# Patient Record
Sex: FEMALE | Race: OTHER | Employment: FULL TIME | URBAN - METROPOLITAN AREA
[De-identification: names, ages, dates, MRNs, and addresses within clinical notes are randomized per-mention and may not be internally consistent; named-entity substitution may affect disease eponyms.]

---

## 2017-01-04 ENCOUNTER — OFFICE VISIT (OUTPATIENT)
Dept: ENDOCRINOLOGY CLINIC | Facility: CLINIC | Age: 42
End: 2017-01-04

## 2017-01-04 VITALS
HEART RATE: 99 BPM | DIASTOLIC BLOOD PRESSURE: 62 MMHG | BODY MASS INDEX: 35.58 KG/M2 | WEIGHT: 200.81 LBS | HEIGHT: 63 IN | SYSTOLIC BLOOD PRESSURE: 111 MMHG

## 2017-01-04 DIAGNOSIS — Z79.4 UNCONTROLLED TYPE 2 DIABETES MELLITUS WITH HYPERGLYCEMIA, WITH LONG-TERM CURRENT USE OF INSULIN (HCC): Primary | ICD-10-CM

## 2017-01-04 DIAGNOSIS — E11.65 UNCONTROLLED TYPE 2 DIABETES MELLITUS WITH HYPERGLYCEMIA, WITH LONG-TERM CURRENT USE OF INSULIN (HCC): Primary | ICD-10-CM

## 2017-01-04 LAB
GLUCOSE BLOOD: 224
TEST STRIP LOT #: NORMAL NUMERIC

## 2017-01-04 PROCEDURE — 82962 GLUCOSE BLOOD TEST: CPT | Performed by: INTERNAL MEDICINE

## 2017-01-04 PROCEDURE — 99213 OFFICE O/P EST LOW 20 MIN: CPT | Performed by: INTERNAL MEDICINE

## 2017-01-04 PROCEDURE — 99212 OFFICE O/P EST SF 10 MIN: CPT | Performed by: INTERNAL MEDICINE

## 2017-01-04 PROCEDURE — 36416 COLLJ CAPILLARY BLOOD SPEC: CPT | Performed by: INTERNAL MEDICINE

## 2017-01-04 NOTE — PROGRESS NOTES
Name: Natasha Grant  Date: 1/4/2017    Referring Physician: No ref. provider found    HISTORY OF PRESENT ILLNESS   Natasha Grant is a 46year old female who presents for diabetes mellitus, diagnosed over 20 years ago.   Since last visit she lost her job and tablet (20 mg total) by mouth nightly., Disp: 30 tablet, Rfl: 3  •  BD PEN NEEDLE HOSSEIN U/F 32G X 4 MM Does not apply Misc, USE UTD, Disp: , Rfl: 11  •  Glucose Blood (TIAGO CONTOUR TEST) In Vitro Strip, Check BS twice daily, Disp: 100 strip, Rfl: 11  •  In Fede Wren (x2)         PHYSICAL EXAM  /62 mmHg  Pulse 99  Ht 5' 3\" (1.6 m)  Wt 200 lb 12.8 oz (91.082 kg)  BMI 35.58 kg/m2    General Appearance:  alert, well developed, in no acute distress  Eyes:  normal conjunctivae, sclera. , normal sclera

## 2017-01-04 NOTE — PATIENT INSTRUCTIONS
Toujeo 48 units SQ daily    Humalog  INSULIN SLIDING SCALE  Base Values  Breakfast: 20  Lunch: 20  Dinner: 20  Ranges:  80-99: -2  100-119: 0  120-139: 0  140-159: 1  160-179: 1  180-199: 2  200-219: 2  220-239: 3  240-259: 3  260-279: 4  280-299: 4  300-3

## 2017-01-05 ENCOUNTER — TELEPHONE (OUTPATIENT)
Dept: FAMILY MEDICINE CLINIC | Facility: CLINIC | Age: 42
End: 2017-01-05

## 2017-01-05 NOTE — TELEPHONE ENCOUNTER
LMTCB. Please transfer to [id number] until 4:45pm today, and [id number] any time. Please note pt was seen by Dr Skye Reza yesterday regarding uncontrolled DM.

## 2017-01-05 NOTE — TELEPHONE ENCOUNTER
----- Message from Vincent Shankar DO sent at 1/2/2017 10:03 PM CST -----  Make sure to get back on the insulin and see your endocrinologist.  Your hemoglobin A1c was at 9.2 showing that your diabetes is uncontrolled.   With the uncontrolled diabetes your tr

## 2017-01-27 ENCOUNTER — OFFICE VISIT (OUTPATIENT)
Dept: FAMILY MEDICINE CLINIC | Facility: CLINIC | Age: 42
End: 2017-01-27

## 2017-01-27 ENCOUNTER — APPOINTMENT (OUTPATIENT)
Dept: LAB | Age: 42
End: 2017-01-27
Attending: FAMILY MEDICINE
Payer: MEDICAID

## 2017-01-27 ENCOUNTER — HOSPITAL ENCOUNTER (OUTPATIENT)
Dept: GENERAL RADIOLOGY | Age: 42
Discharge: HOME | End: 2017-01-27
Attending: FAMILY MEDICINE
Payer: MEDICAID

## 2017-01-27 VITALS
DIASTOLIC BLOOD PRESSURE: 80 MMHG | HEIGHT: 63 IN | WEIGHT: 204 LBS | BODY MASS INDEX: 36.14 KG/M2 | HEART RATE: 94 BPM | TEMPERATURE: 97 F | SYSTOLIC BLOOD PRESSURE: 132 MMHG

## 2017-01-27 DIAGNOSIS — M25.511 CHRONIC RIGHT SHOULDER PAIN: ICD-10-CM

## 2017-01-27 DIAGNOSIS — M25.641 STIFFNESS OF HAND JOINT, RIGHT: ICD-10-CM

## 2017-01-27 DIAGNOSIS — M75.21 BICEPS TENDINITIS, RIGHT: Primary | ICD-10-CM

## 2017-01-27 DIAGNOSIS — G89.29 CHRONIC RIGHT SHOULDER PAIN: ICD-10-CM

## 2017-01-27 DIAGNOSIS — M75.21 BICEPS TENDINITIS, RIGHT: ICD-10-CM

## 2017-01-27 DIAGNOSIS — M25.642 HAND JOINT STIFF, LEFT: ICD-10-CM

## 2017-01-27 DIAGNOSIS — IMO0001 UNCONTROLLED TYPE 2 DIABETES MELLITUS WITHOUT COMPLICATION, WITH LONG-TERM CURRENT USE OF INSULIN: ICD-10-CM

## 2017-01-27 LAB
CRP SERPL-MCNC: 2 MG/DL (ref 0–0.9)
ERYTHROCYTE [SEDIMENTATION RATE] IN BLOOD: 8 MM/HR (ref 0–20)
RHEUMATOID FACT SER QL: <5 IU/ML

## 2017-01-27 PROCEDURE — 73030 X-RAY EXAM OF SHOULDER: CPT

## 2017-01-27 PROCEDURE — 99215 OFFICE O/P EST HI 40 MIN: CPT | Performed by: FAMILY MEDICINE

## 2017-01-27 PROCEDURE — 73130 X-RAY EXAM OF HAND: CPT

## 2017-01-27 PROCEDURE — 86431 RHEUMATOID FACTOR QUANT: CPT

## 2017-01-27 PROCEDURE — 86140 C-REACTIVE PROTEIN: CPT

## 2017-01-27 PROCEDURE — 85652 RBC SED RATE AUTOMATED: CPT

## 2017-01-27 PROCEDURE — 86038 ANTINUCLEAR ANTIBODIES: CPT

## 2017-01-27 PROCEDURE — 99212 OFFICE O/P EST SF 10 MIN: CPT | Performed by: FAMILY MEDICINE

## 2017-01-27 PROCEDURE — 36415 COLL VENOUS BLD VENIPUNCTURE: CPT

## 2017-01-27 RX ORDER — DICLOFENAC SODIUM 75 MG/1
75 TABLET, DELAYED RELEASE ORAL 2 TIMES DAILY
Qty: 42 TABLET | Refills: 0 | Status: SHIPPED | OUTPATIENT
Start: 2017-01-27 | End: 2017-02-13

## 2017-01-27 NOTE — PROGRESS NOTES
Patient ID: Saundra De Oliveira is a 46year old female. HPI  Patient presents with:  Shoulder Pain: Right  Medication Request: Insulin, pt wants a six month supply     She states for 2 months now she has had right shoulder pain anteriorly. No trauma.   She Subcutaneous Solution Pen-injector Inject 40 Units into the skin daily. Disp: 6 pen Rfl: 1   insulin aspart (NOVOLOG FLEXPEN) 100 UNIT/ML Subcutaneous Solution Pen-injector Inject 20 Units into the skin 3 (three) times daily before meals.  Disp: 5 pen Rfl: Internal Rotation 5/5 5/5      External Rotation 5/5 5/5      Speed's Test 5/5 5/5   Stability/Laxity WNL WNL   Pain in Impingement Arc none none   Apprehension Sign negative  negative    Pain         Rotator Cuff resistance none none       Bicipital Groov (two) times daily.  -     Rheumatology - Dr Nikolas Bhatti  -     XR HAND BILAT (CPT=73130);  Future    Uncontrolled type 2 diabetes mellitus without complication, with long-term current use of insulin (HCC)  -     Insulin Glargine (TOUJEO SOLOSTAR) 300 UNIT/M

## 2017-01-31 ENCOUNTER — TELEPHONE (OUTPATIENT)
Dept: FAMILY MEDICINE CLINIC | Facility: CLINIC | Age: 42
End: 2017-01-31

## 2017-01-31 DIAGNOSIS — R79.82 ELEVATED C-REACTIVE PROTEIN (CRP): ICD-10-CM

## 2017-01-31 DIAGNOSIS — M79.641 PAIN IN BOTH HANDS: Primary | ICD-10-CM

## 2017-01-31 DIAGNOSIS — M79.642 PAIN IN BOTH HANDS: Primary | ICD-10-CM

## 2017-01-31 DIAGNOSIS — M25.649 HAND JOINT STIFF, UNSPECIFIED LATERALITY: ICD-10-CM

## 2017-01-31 LAB — ANA SER QL: NEGATIVE

## 2017-02-13 ENCOUNTER — TELEPHONE (OUTPATIENT)
Dept: FAMILY MEDICINE CLINIC | Facility: CLINIC | Age: 42
End: 2017-02-13

## 2017-02-13 RX ORDER — NAPROXEN 500 MG/1
500 TABLET ORAL 2 TIMES DAILY WITH MEALS
Qty: 42 TABLET | Refills: 1 | Status: SHIPPED | OUTPATIENT
Start: 2017-02-13 | End: 2017-03-06

## 2017-02-13 NOTE — TELEPHONE ENCOUNTER
----- Message from Quintin Boyer DO sent at 1/28/2017  7:32 PM CST -----  Minimal arthritic changes at the acromioclavicular joint which is not uncommon. This is not severe enough for any type of surgery. Hopefully the medicine helps.

## 2017-02-13 NOTE — TELEPHONE ENCOUNTER
pt was inform of your message below. She stated that she was not able to continue taking Diclofenac as it made her get a cough and she felt cramps on her chest so she stopped taking it. Pt feels fine now but she continues to have the shoulder pain .

## 2017-02-14 NOTE — TELEPHONE ENCOUNTER
Left message to call back. OSMANY, Please transfer to 21 [phone] until 430 today, or [phone] anytime. Thanks.

## 2017-02-17 NOTE — TELEPHONE ENCOUNTER
Recommendations per Dr. Jacki Friedman reviewed. Pt verbalized understanding, agreed with information relayed, and denied further questions at this time.

## 2017-03-01 ENCOUNTER — OFFICE VISIT (OUTPATIENT)
Dept: ENDOCRINOLOGY CLINIC | Facility: CLINIC | Age: 42
End: 2017-03-01

## 2017-03-01 ENCOUNTER — TELEPHONE (OUTPATIENT)
Dept: OPHTHALMOLOGY | Facility: CLINIC | Age: 42
End: 2017-03-01

## 2017-03-01 VITALS
SYSTOLIC BLOOD PRESSURE: 116 MMHG | DIASTOLIC BLOOD PRESSURE: 78 MMHG | WEIGHT: 204 LBS | RESPIRATION RATE: 18 BRPM | HEART RATE: 84 BPM | BODY MASS INDEX: 36 KG/M2

## 2017-03-01 DIAGNOSIS — E13.9 OTHER SPECIFIED DIABETES MELLITUS: Primary | ICD-10-CM

## 2017-03-01 LAB
CARTRIDGE LOT#: ABNORMAL NUMERIC
GLUCOSE BLOOD: 170
HEMOGLOBIN A1C: 9.5 % (ref 4.3–5.6)
TEST STRIP LOT #: NORMAL NUMERIC

## 2017-03-01 PROCEDURE — 82962 GLUCOSE BLOOD TEST: CPT | Performed by: INTERNAL MEDICINE

## 2017-03-01 PROCEDURE — 99214 OFFICE O/P EST MOD 30 MIN: CPT | Performed by: INTERNAL MEDICINE

## 2017-03-01 PROCEDURE — 83036 HEMOGLOBIN GLYCOSYLATED A1C: CPT | Performed by: INTERNAL MEDICINE

## 2017-03-01 PROCEDURE — 36416 COLLJ CAPILLARY BLOOD SPEC: CPT | Performed by: INTERNAL MEDICINE

## 2017-03-01 PROCEDURE — 99212 OFFICE O/P EST SF 10 MIN: CPT | Performed by: INTERNAL MEDICINE

## 2017-03-01 RX ORDER — DULOXETIN HYDROCHLORIDE 30 MG/1
30 CAPSULE, DELAYED RELEASE ORAL DAILY
Qty: 30 CAPSULE | Refills: 5 | Status: SHIPPED | OUTPATIENT
Start: 2017-03-01 | End: 2017-08-30

## 2017-03-01 RX ORDER — CYCLOBENZAPRINE HCL 10 MG
TABLET ORAL
COMMUNITY
Start: 2017-02-28 | End: 2018-04-16

## 2017-03-01 NOTE — PATIENT INSTRUCTIONS
Toujeo 48 units SQ daily    Humalog  INSULIN SLIDING SCALE  Base Values  Breakfast: 20  Lunch: 20  Dinner: 20  Ranges:  80-99: -2  100-119: 0  120-139: 0  140-159: 1  160-179: 2  180-199: 3  200-219: 4  220-239: 5  240-259: 6  260-279: 7  280-299: 8  300-3

## 2017-03-01 NOTE — PROGRESS NOTES
Name: Miah Larson  Date: 3/1/2017    Referring Physician: No ref. provider found    HISTORY OF PRESENT ILLNESS   Miah Larson is a 46year old female who presents for diabetes mellitus, diagnosed over 20 years ago.   Since last visit she lost her job and 6  •  insulin aspart (NOVOLOG FLEXPEN) 100 UNIT/ML Subcutaneous Solution Pen-injector, Inject 20 Units into the skin 3 (three) times daily before meals. , Disp: 5 pen, Rfl: 6  •  Blood Glucose Monitoring Suppl (TRUE METRIX METER) W/DEVICE Does not apply Kit [facility], 33 1.2 week, 6 lb(s) Male / Stefan Ochoa ([address]) Baby sent to [facility] for 1 month due to breathing issues   • Pregnancy      8 hr labor  ; [facility]; 36 week 7 lb(s) Male; [facility] Dr Valdemar Terrell) baby had pr dose  -Discussed importance of glycemic control to prevent complications of diabetes  -Discussed complications of diabetes include retinopathy, neuropathy, nephropathy and cardiovascular disease  -Discussed importance of SBGM  -Discussed importance of low

## 2017-03-01 NOTE — TELEPHONE ENCOUNTER
Pt came in today stated her ins changed and was referred to Dr. Sena Boeck and he doesn't take illinicare. She wants to know if you can refer her to another doctor or if you want to see her first. She does have an appt with you on 03/28.

## 2017-03-02 NOTE — TELEPHONE ENCOUNTER
Patient has diabetic retinopathy and  at her last EE with me  I advised that she see [facility]. She went and was being treated there but she lost her health insurance and now has [employer] . [facility] does not accept [employer].  She has an appoiontment t

## 2017-03-23 ENCOUNTER — TELEPHONE (OUTPATIENT)
Dept: OPTOMETRY | Facility: CLINIC | Age: 42
End: 2017-03-23

## 2017-03-23 NOTE — TELEPHONE ENCOUNTER
Rody/Rafa [phone]  requesting progress note from LOV:08/11/2015. please fax# [phone] thank you.

## 2017-03-28 ENCOUNTER — OFFICE VISIT (OUTPATIENT)
Dept: OPTOMETRY | Facility: CLINIC | Age: 42
End: 2017-03-28

## 2017-03-28 DIAGNOSIS — H52.13 MYOPIA WITH ASTIGMATISM, BILATERAL: Primary | ICD-10-CM

## 2017-03-28 DIAGNOSIS — H52.203 MYOPIA WITH ASTIGMATISM, BILATERAL: Primary | ICD-10-CM

## 2017-03-28 PROBLEM — H52.10 MYOPIA WITH ASTIGMATISM: Status: ACTIVE | Noted: 2017-03-28

## 2017-03-28 PROBLEM — H52.209 MYOPIA WITH ASTIGMATISM: Status: ACTIVE | Noted: 2017-03-28

## 2017-03-28 PROCEDURE — 92015 DETERMINE REFRACTIVE STATE: CPT | Performed by: OPTOMETRIST

## 2017-03-28 PROCEDURE — 99211 OFF/OP EST MAY X REQ PHY/QHP: CPT | Performed by: OPTOMETRIST

## 2017-03-28 NOTE — PROGRESS NOTES
Brandon Huitron is a 46year old female. HPI:     HPI     Patient is in for a  Refraction only. Patient has BDR and was seeing [facility] before her insurance changed. She has [employer] and I advised her to make an appt with Samuel Velazquez for a recheck.  She sullivan Pen-injector Inject 40 Units into the skin daily. Disp: 6 pen Rfl: 6   insulin aspart (NOVOLOG FLEXPEN) 100 UNIT/ML Subcutaneous Solution Pen-injector Inject 20 Units into the skin 3 (three) times daily before meals.  Disp: 5 pen Rfl: 6   Blood Glucose Giulia Axis   Right -0.25 -5.50 20   Left -0.75 -2.50 170       Type:  Single vision                 ASSESSMENT/PLAN:     Diagnoses and Plan:     Myopia with astigmatism  New glasses RX given to update as needed.  Patient has an appointment with Catherine Evans As

## 2017-03-28 NOTE — PATIENT INSTRUCTIONS
Myopia with astigmatism  New glasses RX given to update as needed. Patient has an appointment with [facility] on April 7, 2017. Patient will keep the appointment.

## 2017-03-28 NOTE — ASSESSMENT & PLAN NOTE
New glasses RX given to update as needed. Patient has an appointment with [facility] on April 7, 2017. Patient will keep the appointment.

## 2017-04-03 ENCOUNTER — TELEPHONE (OUTPATIENT)
Dept: ENDOCRINOLOGY CLINIC | Facility: CLINIC | Age: 42
End: 2017-04-03

## 2017-04-03 DIAGNOSIS — IMO0001 UNCONTROLLED TYPE 2 DIABETES MELLITUS WITHOUT COMPLICATION, WITH LONG-TERM CURRENT USE OF INSULIN: Primary | ICD-10-CM

## 2017-04-03 NOTE — TELEPHONE ENCOUNTER
Called patient with interpretor. Let her know per [facility] she should switch to Basaglar at the same dose. Prescription sent to pharmacy.

## 2017-04-03 NOTE — TELEPHONE ENCOUNTER
Pt states ins will not cover Insulin Glargine (TOUJEO SOLOSTAR) 300 UNIT/ML asking for an alternative insulin. Pls advise thank you. Yi speaker.

## 2017-07-19 ENCOUNTER — OFFICE VISIT (OUTPATIENT)
Dept: ENDOCRINOLOGY CLINIC | Facility: CLINIC | Age: 42
End: 2017-07-19

## 2017-07-19 VITALS
SYSTOLIC BLOOD PRESSURE: 132 MMHG | DIASTOLIC BLOOD PRESSURE: 86 MMHG | WEIGHT: 200.81 LBS | BODY MASS INDEX: 36.95 KG/M2 | HEIGHT: 62 IN | HEART RATE: 87 BPM

## 2017-07-19 DIAGNOSIS — E11.65 UNCONTROLLED TYPE 2 DIABETES MELLITUS WITH HYPERGLYCEMIA, WITH LONG-TERM CURRENT USE OF INSULIN (HCC): Primary | ICD-10-CM

## 2017-07-19 DIAGNOSIS — IMO0001 UNCONTROLLED TYPE 2 DIABETES MELLITUS WITHOUT COMPLICATION, WITH LONG-TERM CURRENT USE OF INSULIN: ICD-10-CM

## 2017-07-19 DIAGNOSIS — Z79.4 UNCONTROLLED TYPE 2 DIABETES MELLITUS WITH HYPERGLYCEMIA, WITH LONG-TERM CURRENT USE OF INSULIN (HCC): Primary | ICD-10-CM

## 2017-07-19 LAB
CARTRIDGE LOT#: ABNORMAL NUMERIC
GLUCOSE BLOOD: 220
HEMOGLOBIN A1C: 11.1 % (ref 4.3–5.6)
TEST STRIP LOT #: NORMAL NUMERIC

## 2017-07-19 PROCEDURE — 83036 HEMOGLOBIN GLYCOSYLATED A1C: CPT | Performed by: INTERNAL MEDICINE

## 2017-07-19 PROCEDURE — 99214 OFFICE O/P EST MOD 30 MIN: CPT | Performed by: INTERNAL MEDICINE

## 2017-07-19 PROCEDURE — 36416 COLLJ CAPILLARY BLOOD SPEC: CPT | Performed by: INTERNAL MEDICINE

## 2017-07-19 PROCEDURE — 99212 OFFICE O/P EST SF 10 MIN: CPT | Performed by: INTERNAL MEDICINE

## 2017-07-19 PROCEDURE — 82962 GLUCOSE BLOOD TEST: CPT | Performed by: INTERNAL MEDICINE

## 2017-07-19 NOTE — PROGRESS NOTES
Name: Seven Boland  Date: 7/19/2017    Referring Physician: No ref. provider found    HISTORY OF PRESENT ILLNESS   Seven Boland is a 38year old female who presents for diabetes mellitus, diagnosed over 20 years ago.   She is frequently forgetting her ins by Does not apply route daily. , Disp: 1 kit, Rfl: 0  •  Glucose Blood (TRUE METRIX BLOOD GLUCOSE TEST) In Vitro Strip, Check 3 times daily, Disp: 100 strip, Rfl: 6  •  Lancets 30G Does not apply Misc, Check 3 times per day, Disp: 100 each, Rfl: 3  •  Insul Pregnancy     spontaneous , unknown sex, SAB at 6 weeks no D&C   • Pregnancy 2009    D&C 2009   • [facility] 2009   • Unspecified essential hypertension     stopped medication    • Urinary incontinence 2008 diet  -Increase Basaglar to 60 units SQ daily   -Continue Novolog 18 units SQ TID with meals  -Continue CF 1:40>140  -Normotensive  -UTD with optho  -Discussed at length the importance of taking insulin 4 times per day     This is a 25 minute visit and gre

## 2017-07-19 NOTE — PATIENT INSTRUCTIONS
Basaglar (white) 60 units SQ daily in the morning ([facility])    Novolog  INSULIN SLIDING SCALE  Base Values  Breakfast: 18  Lunch: 18  Dinner: 18  Ranges:  80-99: -2  100-119: 0  120-139: 0  140-159: 1  160-179: 1  180-199: 2  200-219: 2  220-239: 3  240-259

## 2017-08-30 ENCOUNTER — TELEPHONE (OUTPATIENT)
Dept: ENDOCRINOLOGY CLINIC | Facility: CLINIC | Age: 42
End: 2017-08-30

## 2017-08-30 RX ORDER — DULOXETIN HYDROCHLORIDE 30 MG/1
30 CAPSULE, DELAYED RELEASE ORAL DAILY
Qty: 30 CAPSULE | Refills: 2 | Status: SHIPPED | OUTPATIENT
Start: 2017-08-30 | End: 2018-10-26

## 2017-08-30 NOTE — TELEPHONE ENCOUNTER
LOV 7/19/2017. Looks like last refill was under Dr. Debbie Steiner name. Are you prescribing for patient or route to PCP?

## 2017-08-30 NOTE — TELEPHONE ENCOUNTER
[facility] called to request refill:    Current Outpatient Prescriptions:     •  DULoxetine HCl (CYMBALTA) 30 MG Oral Cap DR Particles, Take 1 capsule (30 mg total) by mouth daily. , Disp: 30 capsule, Rfl: 5

## 2018-02-19 ENCOUNTER — TELEPHONE (OUTPATIENT)
Dept: ENDOCRINOLOGY CLINIC | Facility: CLINIC | Age: 43
End: 2018-02-19

## 2018-02-19 DIAGNOSIS — IMO0001 UNCONTROLLED TYPE 2 DIABETES MELLITUS WITHOUT COMPLICATION, WITH LONG-TERM CURRENT USE OF INSULIN: ICD-10-CM

## 2018-02-19 NOTE — TELEPHONE ENCOUNTER
LOV 7/19/2017. Follow up scheduled 2/21. Per [facility] protocol ok to refill through upcoming appt.

## 2018-02-19 NOTE — TELEPHONE ENCOUNTER
Current Outpatient Prescriptions:  Insulin Aspart Pen (NOVOLOG FLEXPEN) 100 UNIT/ML Subcutaneous Solution Pen-injector Inject 20 Units into the skin 3 (three) times daily before meals.  Disp: 5 pen Rfl: 6     Refill

## 2018-02-21 ENCOUNTER — APPOINTMENT (OUTPATIENT)
Dept: LAB | Age: 43
End: 2018-02-21
Attending: INTERNAL MEDICINE
Payer: MEDICAID

## 2018-02-21 ENCOUNTER — OFFICE VISIT (OUTPATIENT)
Dept: ENDOCRINOLOGY CLINIC | Facility: CLINIC | Age: 43
End: 2018-02-21

## 2018-02-21 ENCOUNTER — TELEPHONE (OUTPATIENT)
Dept: ENDOCRINOLOGY CLINIC | Facility: CLINIC | Age: 43
End: 2018-02-21

## 2018-02-21 VITALS
HEART RATE: 94 BPM | BODY MASS INDEX: 35.35 KG/M2 | DIASTOLIC BLOOD PRESSURE: 68 MMHG | SYSTOLIC BLOOD PRESSURE: 119 MMHG | WEIGHT: 197 LBS | HEIGHT: 62.5 IN

## 2018-02-21 DIAGNOSIS — E11.8 UNCONTROLLED TYPE 2 DIABETES MELLITUS WITH COMPLICATION, WITH LONG-TERM CURRENT USE OF INSULIN (HCC): Primary | ICD-10-CM

## 2018-02-21 DIAGNOSIS — E11.65 UNCONTROLLED TYPE 2 DIABETES MELLITUS WITH COMPLICATION, WITH LONG-TERM CURRENT USE OF INSULIN (HCC): ICD-10-CM

## 2018-02-21 DIAGNOSIS — E11.8 UNCONTROLLED TYPE 2 DIABETES MELLITUS WITH COMPLICATION, WITH LONG-TERM CURRENT USE OF INSULIN (HCC): ICD-10-CM

## 2018-02-21 DIAGNOSIS — Z79.4 UNCONTROLLED TYPE 2 DIABETES MELLITUS WITH COMPLICATION, WITH LONG-TERM CURRENT USE OF INSULIN (HCC): Primary | ICD-10-CM

## 2018-02-21 DIAGNOSIS — IMO0001 UNCONTROLLED TYPE 2 DIABETES MELLITUS WITHOUT COMPLICATION, WITH LONG-TERM CURRENT USE OF INSULIN: ICD-10-CM

## 2018-02-21 DIAGNOSIS — Z79.4 UNCONTROLLED TYPE 2 DIABETES MELLITUS WITH COMPLICATION, WITH LONG-TERM CURRENT USE OF INSULIN (HCC): ICD-10-CM

## 2018-02-21 DIAGNOSIS — E11.65 UNCONTROLLED TYPE 2 DIABETES MELLITUS WITH COMPLICATION, WITH LONG-TERM CURRENT USE OF INSULIN (HCC): Primary | ICD-10-CM

## 2018-02-21 LAB
ALBUMIN SERPL BCP-MCNC: 3.7 G/DL (ref 3.5–4.8)
ALBUMIN/GLOB SERPL: 1.1 {RATIO} (ref 1–2)
ALP SERPL-CCNC: 68 U/L (ref 32–100)
ALT SERPL-CCNC: 53 U/L (ref 14–54)
ANION GAP SERPL CALC-SCNC: 13 MMOL/L (ref 0–18)
AST SERPL-CCNC: 49 U/L (ref 15–41)
BILIRUB SERPL-MCNC: 0.5 MG/DL (ref 0.3–1.2)
BUN SERPL-MCNC: 10 MG/DL (ref 8–20)
BUN/CREAT SERPL: 16.4 (ref 10–20)
CALCIUM SERPL-MCNC: 9.5 MG/DL (ref 8.5–10.5)
CARTRIDGE LOT#: NORMAL NUMERIC
CHLORIDE SERPL-SCNC: 98 MMOL/L (ref 95–110)
CHOLEST SERPL-MCNC: 242 MG/DL (ref 110–200)
CO2 SERPL-SCNC: 29 MMOL/L (ref 22–32)
CREAT SERPL-MCNC: 0.61 MG/DL (ref 0.5–1.5)
CREAT UR-MCNC: 150.6 MG/DL
GLOBULIN PLAS-MCNC: 3.4 G/DL (ref 2.5–3.7)
GLUCOSE BLOOD: 221
GLUCOSE SERPL-MCNC: 166 MG/DL (ref 70–99)
HDLC SERPL-MCNC: 39 MG/DL
HEMOGLOBIN A1C: >14 % (ref 4.3–5.6)
LDLC SERPL CALC-MCNC: 130 MG/DL (ref 0–99)
MICROALBUMIN UR-MCNC: 8.9 MG/DL (ref 0–1.8)
MICROALBUMIN/CREAT UR: 59.1 MG/G{CREAT} (ref 0–20)
NONHDLC SERPL-MCNC: 203 MG/DL
OSMOLALITY UR CALC.SUM OF ELEC: 293 MOSM/KG (ref 275–295)
PATIENT FASTING: YES
POTASSIUM SERPL-SCNC: 3.5 MMOL/L (ref 3.3–5.1)
PROT SERPL-MCNC: 7.1 G/DL (ref 5.9–8.4)
SODIUM SERPL-SCNC: 140 MMOL/L (ref 136–144)
TEST STRIP LOT #: NORMAL NUMERIC
TRIGL SERPL-MCNC: 365 MG/DL (ref 1–149)
TSH SERPL-ACNC: 0.9 UIU/ML (ref 0.45–5.33)

## 2018-02-21 PROCEDURE — 82043 UR ALBUMIN QUANTITATIVE: CPT

## 2018-02-21 PROCEDURE — 36416 COLLJ CAPILLARY BLOOD SPEC: CPT | Performed by: INTERNAL MEDICINE

## 2018-02-21 PROCEDURE — 99214 OFFICE O/P EST MOD 30 MIN: CPT | Performed by: INTERNAL MEDICINE

## 2018-02-21 PROCEDURE — 83036 HEMOGLOBIN GLYCOSYLATED A1C: CPT | Performed by: INTERNAL MEDICINE

## 2018-02-21 PROCEDURE — 99212 OFFICE O/P EST SF 10 MIN: CPT | Performed by: INTERNAL MEDICINE

## 2018-02-21 PROCEDURE — 82570 ASSAY OF URINE CREATININE: CPT

## 2018-02-21 PROCEDURE — 80061 LIPID PANEL: CPT

## 2018-02-21 PROCEDURE — 80053 COMPREHEN METABOLIC PANEL: CPT

## 2018-02-21 PROCEDURE — 82962 GLUCOSE BLOOD TEST: CPT | Performed by: INTERNAL MEDICINE

## 2018-02-21 PROCEDURE — 95250 CONT GLUC MNTR PHYS/QHP EQP: CPT | Performed by: INTERNAL MEDICINE

## 2018-02-21 PROCEDURE — 84443 ASSAY THYROID STIM HORMONE: CPT

## 2018-02-21 PROCEDURE — 36415 COLL VENOUS BLD VENIPUNCTURE: CPT

## 2018-02-21 NOTE — TELEPHONE ENCOUNTER
Called pharmacy- Mae Jurado \"drug not covered\", nor is Lantus. Pharmacist states Basaglar preferred by insurance and states no option for PA, drug \"flat out not covered\"    Please advise.

## 2018-02-21 NOTE — PATIENT INSTRUCTIONS
Tresiba 80 units SQ daily    Novolog  INSULIN SLIDING SCALE  Base Values  Breakfast: 20  Lunch: 20  Dinner: 20  Ranges:  80-99: -2  100-119: 0  120-139: 0  140-159: 1  160-179: 1  180-199: 2  200-219: 2  220-239: 3  240-259: 3  260-279: 4  280-299: 4  300-

## 2018-02-21 NOTE — TELEPHONE ENCOUNTER
Javed Santacruz noted. Please inform patient and ask her to restart Basaglar 80 units SQ daily. Thanks.

## 2018-02-21 NOTE — PROGRESS NOTES
Name: Scott Zelaya  Date: 2/21/2018    Referring Physician: No ref. provider found    HISTORY OF PRESENT ILLNESS   Scott Zelaya is a 38year old female who presents for diabetes mellitus, diagnosed over 20 years ago.       She has been lost to follow up d apply route daily. , Disp: 1 kit, Rfl: 0  •  Glucose Blood (TRUE METRIX BLOOD GLUCOSE TEST) In Vitro Strip, Check 3 times daily, Disp: 100 strip, Rfl: 6  •  Lancets 30G Does not apply Misc, Check 3 times per day, Disp: 100 each, Rfl: 3  •  Insulin Pen Needl Calderon Darnell) Baby sent to [facility] for 1 month due to breathing issues   • Pregnancy     8 hr labor  ; [facility]; 36 week 7 lb(s) Male; Maritza [facility] Dr Kendra Cason) baby had problems breathing and remained in hospital for 2 weeks   • Pre of glycemic control to prevent complications of diabetes  -Discussed complications of diabetes include retinopathy, neuropathy, nephropathy and cardiovascular disease  -Discussed importance of SBGM  -Discussed importance of low CHO diet  -She is concerned

## 2018-02-21 NOTE — H&P
Order for Salazar CGM placed. Patient will wear sensor for 1 week and then return to clinic. Placed on R upper arm.  SN for Salazar sensor [id number]

## 2018-02-22 RX ORDER — INSULIN GLARGINE 100 [IU]/ML
80 INJECTION, SOLUTION SUBCUTANEOUS DAILY
Qty: 15 ML | Refills: 3 | Status: SHIPPED | OUTPATIENT
Start: 2018-02-22 | End: 2018-04-25

## 2018-02-22 NOTE — TELEPHONE ENCOUNTER
Called patient with a . Informed patient of Dr. Vanessa Abrams message below. She will try the higher dose of basaglar. Rx sent.

## 2018-02-28 ENCOUNTER — OFFICE VISIT (OUTPATIENT)
Dept: ENDOCRINOLOGY CLINIC | Facility: CLINIC | Age: 43
End: 2018-02-28

## 2018-02-28 DIAGNOSIS — IMO0001 UNCONTROLLED TYPE 2 DIABETES MELLITUS WITHOUT COMPLICATION, WITH LONG-TERM CURRENT USE OF INSULIN: Primary | ICD-10-CM

## 2018-02-28 PROCEDURE — 95251 CONT GLUC MNTR ANALYSIS I&R: CPT | Performed by: INTERNAL MEDICINE

## 2018-02-28 PROCEDURE — 99213 OFFICE O/P EST LOW 20 MIN: CPT | Performed by: INTERNAL MEDICINE

## 2018-02-28 PROCEDURE — 99212 OFFICE O/P EST SF 10 MIN: CPT | Performed by: INTERNAL MEDICINE

## 2018-02-28 NOTE — PROGRESS NOTES
Name: Michi Brunner  Date: 2/28/2018    Referring Physician: No ref. provider found    HISTORY OF PRESENT ILLNESS   Michi Brunner is a 38year old female who presents for diabetes mellitus, diagnosed over 20 years ago.       Prior HbA, C or glycohemoglobin strip, Rfl: 1  •  Lancets 30G Does not apply Misc, Check 3 times daily, Disp: 200 each, Rfl: 2  •  DULoxetine HCl (CYMBALTA) 30 MG Oral Cap DR Particles, Take 1 capsule (30 mg total) by mouth daily. , Disp: 30 capsule, Rfl: 2  •  Cyclobenzaprine HCl 10 MG O Tamie for 1 month due to breathing issues   • Pregnancy     8 hr labor  ; [facility]; 36 week 7 lb(s) Male; Shavon [facility] Dr Joyce Washington) baby had problems breathing and remained in hospital for 2 weeks   • Pregnancy     spontaneous abort cardiovascular disease  -Discussed importance of SBGM  -Discussed importance of low CHO diet  -Continue Basaglar 80 units SQ daily   -Increase Novolog to 22 units SQ TID with meals  -Continue CF 1:40>140  -Normotensive  -UTD with optho  -Discussed at lengt

## 2018-02-28 NOTE — PATIENT INSTRUCTIONS
Basaglar 80 units SQ daily    Novolog  INSULIN SLIDING SCALE  Base Values  Breakfast: 22  Lunch: 22  Dinner: 22  Ranges:  80-99: -2  100-119: 0  120-139: 0  140-159: 1  160-179: 1  180-199: 2  200-219: 2  220-239: 3  240-259: 3  260-279: 4  280-299: 4  300

## 2018-04-07 DIAGNOSIS — IMO0001 UNCONTROLLED TYPE 2 DIABETES MELLITUS WITHOUT COMPLICATION, WITH LONG-TERM CURRENT USE OF INSULIN: ICD-10-CM

## 2018-04-09 RX ORDER — INSULIN ASPART 100 [IU]/ML
INJECTION, SOLUTION INTRAVENOUS; SUBCUTANEOUS
Qty: 15 ML | Refills: 5 | Status: SHIPPED | OUTPATIENT
Start: 2018-04-09 | End: 2018-04-25

## 2018-04-16 ENCOUNTER — OFFICE VISIT (OUTPATIENT)
Dept: FAMILY MEDICINE CLINIC | Facility: CLINIC | Age: 43
End: 2018-04-16

## 2018-04-16 VITALS
SYSTOLIC BLOOD PRESSURE: 126 MMHG | DIASTOLIC BLOOD PRESSURE: 79 MMHG | WEIGHT: 200.19 LBS | BODY MASS INDEX: 36.84 KG/M2 | HEART RATE: 100 BPM | TEMPERATURE: 98 F | HEIGHT: 62 IN | RESPIRATION RATE: 14 BRPM

## 2018-04-16 DIAGNOSIS — R05.9 COUGH: ICD-10-CM

## 2018-04-16 DIAGNOSIS — R09.81 NASAL CONGESTION: Primary | ICD-10-CM

## 2018-04-16 DIAGNOSIS — J34.3 HYPERTROPHY, NASAL, TURBINATE: ICD-10-CM

## 2018-04-16 DIAGNOSIS — Z23 NEED FOR VACCINATION: ICD-10-CM

## 2018-04-16 PROCEDURE — 99214 OFFICE O/P EST MOD 30 MIN: CPT | Performed by: FAMILY MEDICINE

## 2018-04-16 PROCEDURE — 90471 IMMUNIZATION ADMIN: CPT | Performed by: FAMILY MEDICINE

## 2018-04-16 PROCEDURE — 99212 OFFICE O/P EST SF 10 MIN: CPT | Performed by: FAMILY MEDICINE

## 2018-04-16 PROCEDURE — 90715 TDAP VACCINE 7 YRS/> IM: CPT | Performed by: FAMILY MEDICINE

## 2018-04-16 RX ORDER — MONTELUKAST SODIUM 10 MG/1
10 TABLET ORAL NIGHTLY
Qty: 90 TABLET | Refills: 1 | Status: SHIPPED | OUTPATIENT
Start: 2018-04-16 | End: 2018-10-13

## 2018-04-16 RX ORDER — FLUTICASONE PROPIONATE 50 MCG
2 SPRAY, SUSPENSION (ML) NASAL DAILY
Qty: 1 BOTTLE | Refills: 3 | Status: SHIPPED | OUTPATIENT
Start: 2018-04-16 | End: 2018-08-14

## 2018-04-16 RX ORDER — LORATADINE 10 MG/1
10 TABLET ORAL DAILY
Qty: 90 TABLET | Refills: 1 | Status: SHIPPED | OUTPATIENT
Start: 2018-04-16 | End: 2020-02-10

## 2018-04-16 NOTE — PROGRESS NOTES
Patient ID: Brandon Huitron is a 38year old female. HPI  Patient presents with:  Nasal Congestion  Cough    For 2 weeks now she has been having nasal congestion and a slight cough.   She cannot sleep at night due to the nasal congestion and having [facility] 11/24/2009   • Unspecified essential hypertension     stopped medication 11/09   • Urinary incontinence 2008    surgical       Past Surgical History:  No date: INJECTION ([place])      Comment: injection tendon sheath, ligament Vanessa Carpenter pulse 100, temperature 98.3 °F (36.8 °C), temperature source Oral, resp. rate 14, height 5' 2\" (1.575 m), weight 200 lb 3.2 oz (90.8 kg), not currently breastfeeding. Physical Exam   Constitutional: Patient is oriented to person, place, and time.  Patient Montelukast Sodium (SINGULAIR) 10 MG Oral Tab; Take 1 tablet (10 mg total) by mouth nightly. -     loratadine 10 MG Oral Tab; Take 1 tablet (10 mg total) by mouth daily.         Referrals (if applicable)  No orders of the defined types were placed in this

## 2018-04-25 ENCOUNTER — OFFICE VISIT (OUTPATIENT)
Dept: ENDOCRINOLOGY CLINIC | Facility: CLINIC | Age: 43
End: 2018-04-25

## 2018-04-25 VITALS
HEIGHT: 61 IN | WEIGHT: 198 LBS | SYSTOLIC BLOOD PRESSURE: 137 MMHG | BODY MASS INDEX: 37.38 KG/M2 | HEART RATE: 90 BPM | DIASTOLIC BLOOD PRESSURE: 83 MMHG

## 2018-04-25 DIAGNOSIS — E11.65 UNCONTROLLED TYPE 2 DIABETES MELLITUS WITH COMPLICATION, WITH LONG-TERM CURRENT USE OF INSULIN (HCC): Primary | ICD-10-CM

## 2018-04-25 DIAGNOSIS — IMO0001 UNCONTROLLED TYPE 2 DIABETES MELLITUS WITHOUT COMPLICATION, WITH LONG-TERM CURRENT USE OF INSULIN: ICD-10-CM

## 2018-04-25 DIAGNOSIS — E11.8 UNCONTROLLED TYPE 2 DIABETES MELLITUS WITH COMPLICATION, WITH LONG-TERM CURRENT USE OF INSULIN (HCC): Primary | ICD-10-CM

## 2018-04-25 DIAGNOSIS — Z79.4 UNCONTROLLED TYPE 2 DIABETES MELLITUS WITH COMPLICATION, WITH LONG-TERM CURRENT USE OF INSULIN (HCC): Primary | ICD-10-CM

## 2018-04-25 PROCEDURE — 82962 GLUCOSE BLOOD TEST: CPT | Performed by: INTERNAL MEDICINE

## 2018-04-25 PROCEDURE — 36416 COLLJ CAPILLARY BLOOD SPEC: CPT | Performed by: INTERNAL MEDICINE

## 2018-04-25 PROCEDURE — 99212 OFFICE O/P EST SF 10 MIN: CPT | Performed by: INTERNAL MEDICINE

## 2018-04-25 PROCEDURE — 99213 OFFICE O/P EST LOW 20 MIN: CPT | Performed by: INTERNAL MEDICINE

## 2018-04-25 PROCEDURE — 83036 HEMOGLOBIN GLYCOSYLATED A1C: CPT | Performed by: INTERNAL MEDICINE

## 2018-04-25 RX ORDER — INSULIN GLARGINE 100 [IU]/ML
80 INJECTION, SOLUTION SUBCUTANEOUS DAILY
Qty: 30 ML | Refills: 3 | Status: SHIPPED | OUTPATIENT
Start: 2018-04-25 | End: 2018-08-15

## 2018-04-25 NOTE — PATIENT INSTRUCTIONS
Basaglar 80 units SQ daily    Novolog  INSULIN SLIDING SCALE  Base Values  Breakfast: 22  Lunch: 22  Dinner: 22  Ranges:  80-99: -2  100-119: 0  120-139: 0  140-159: 1  160-179: 2  180-199: 3  200-219: 4  220-239: 5  240-259: 6  260-279: 7  280-299: 8  300

## 2018-04-25 NOTE — PROGRESS NOTES
Name: Elvis Griffin  Date: 4/25/2018    Referring Physician: No ref. provider found    HISTORY OF PRESENT ILLNESS   Elvis Griffin is a 38year old female who presents for diabetes mellitus, diagnosed over 20 years ago.       Prior HbA, C or glycohemoglobin Needle (BD PEN NEEDLE HOSSEIN U/F) 32G X 4 MM Does not apply Misc, Use with insulin pen TID or as directed., Disp: 100 each, Rfl: 2  •  Insulin Aspart Pen (NOVOLOG FLEXPEN) 100 UNIT/ML Subcutaneous Solution Pen-injector, Inject 20 Units into the skin 3 (three History:   Diagnosis Date   • Amblyopia     OD   • Diabetes ([facility]    • Hyperlipidemia    • Pregnancy     12 hr labor , [facility], 33 1.2 week, 6 lb(s) Male / Michell Scot Bowen) Baby sent to [facility] for 1 month due to [address] of glycemic control to prevent complications of diabetes  -Discussed complications of diabetes include retinopathy, neuropathy, nephropathy and cardiovascular disease  -Discussed importance of SBGM  -Discussed importance of low CHO diet  -She continues to

## 2018-05-07 ENCOUNTER — TELEPHONE (OUTPATIENT)
Dept: ENDOCRINOLOGY CLINIC | Facility: CLINIC | Age: 43
End: 2018-05-07

## 2018-05-07 RX ORDER — INSULIN LISPRO 100 U/ML
22 INJECTION, SOLUTION SUBCUTANEOUS
Qty: 30 ML | Refills: 5 | Status: SHIPPED | OUTPATIENT
Start: 2018-05-07 | End: 2018-05-11

## 2018-05-07 NOTE — TELEPHONE ENCOUNTER
LOV 4/25/18. OK to refill and ok to switch short acting per [facility] protocol per insurance preference. Called the patient to inform her of medication change. She verbalized her understanding.

## 2018-05-07 NOTE — TELEPHONE ENCOUNTER
Pharmacy called to let this office know that novolog flex pen is not covered. Admelog is covered. Please call.

## 2018-05-10 NOTE — TELEPHONE ENCOUNTER
[facility] insurance no longer covers Novolog insulin - requesting rx for Admelog insulin. Pls call. Thank you.

## 2018-05-10 NOTE — TELEPHONE ENCOUNTER
LOV 4/25/18 - F/U 6/27/18    Filled per  protocol - pharm requesting Admelog - now preferred by insurance

## 2018-05-11 RX ORDER — INSULIN LISPRO 100 U/ML
22 INJECTION, SOLUTION SUBCUTANEOUS
Qty: 30 ML | Refills: 5 | Status: SHIPPED | OUTPATIENT
Start: 2018-05-11 | End: 2018-10-24

## 2018-06-27 ENCOUNTER — TELEPHONE (OUTPATIENT)
Dept: ENDOCRINOLOGY CLINIC | Facility: CLINIC | Age: 43
End: 2018-06-27

## 2018-07-18 ENCOUNTER — OFFICE VISIT (OUTPATIENT)
Dept: ENDOCRINOLOGY CLINIC | Facility: CLINIC | Age: 43
End: 2018-07-18
Payer: MEDICAID

## 2018-07-18 VITALS
DIASTOLIC BLOOD PRESSURE: 80 MMHG | SYSTOLIC BLOOD PRESSURE: 123 MMHG | BODY MASS INDEX: 38.17 KG/M2 | WEIGHT: 202.19 LBS | HEIGHT: 61 IN | HEART RATE: 102 BPM

## 2018-07-18 DIAGNOSIS — Z79.4 TYPE 2 DIABETES MELLITUS WITHOUT COMPLICATION, WITH LONG-TERM CURRENT USE OF INSULIN (HCC): ICD-10-CM

## 2018-07-18 DIAGNOSIS — Z79.4 UNCONTROLLED TYPE 2 DIABETES MELLITUS WITH COMPLICATION, WITH LONG-TERM CURRENT USE OF INSULIN (HCC): Primary | ICD-10-CM

## 2018-07-18 DIAGNOSIS — E11.8 UNCONTROLLED TYPE 2 DIABETES MELLITUS WITH COMPLICATION, WITH LONG-TERM CURRENT USE OF INSULIN (HCC): Primary | ICD-10-CM

## 2018-07-18 DIAGNOSIS — E11.9 TYPE 2 DIABETES MELLITUS WITHOUT COMPLICATION, WITH LONG-TERM CURRENT USE OF INSULIN (HCC): ICD-10-CM

## 2018-07-18 DIAGNOSIS — E11.65 UNCONTROLLED TYPE 2 DIABETES MELLITUS WITH COMPLICATION, WITH LONG-TERM CURRENT USE OF INSULIN (HCC): Primary | ICD-10-CM

## 2018-07-18 LAB
CARTRIDGE LOT#: ABNORMAL NUMERIC
GLUCOSE BLOOD: 220
HEMOGLOBIN A1C: 11.5 % (ref 4.3–5.6)
TEST STRIP LOT #: NORMAL NUMERIC

## 2018-07-18 PROCEDURE — 83036 HEMOGLOBIN GLYCOSYLATED A1C: CPT | Performed by: INTERNAL MEDICINE

## 2018-07-18 PROCEDURE — 36416 COLLJ CAPILLARY BLOOD SPEC: CPT | Performed by: INTERNAL MEDICINE

## 2018-07-18 PROCEDURE — 99212 OFFICE O/P EST SF 10 MIN: CPT | Performed by: INTERNAL MEDICINE

## 2018-07-18 PROCEDURE — 82962 GLUCOSE BLOOD TEST: CPT | Performed by: INTERNAL MEDICINE

## 2018-07-18 PROCEDURE — 99214 OFFICE O/P EST MOD 30 MIN: CPT | Performed by: INTERNAL MEDICINE

## 2018-07-18 RX ORDER — ATORVASTATIN CALCIUM 20 MG/1
20 TABLET, FILM COATED ORAL NIGHTLY
Qty: 30 TABLET | Refills: 4 | Status: SHIPPED | OUTPATIENT
Start: 2018-07-18 | End: 2018-11-07

## 2018-07-18 RX ORDER — LOSARTAN POTASSIUM 50 MG/1
50 TABLET ORAL DAILY
Qty: 30 TABLET | Refills: 3 | Status: SHIPPED | OUTPATIENT
Start: 2018-07-18 | End: 2018-10-10

## 2018-07-18 NOTE — PROGRESS NOTES
Name: Miah Larson  Date: 7/18/2018    Referring Physician: No ref. provider found    HISTORY OF PRESENT ILLNESS   Miah Larson is a 41year old female who presents for diabetes mellitus, diagnosed over 20 years ago.       Prior HbA, C or glycohemoglobin Rfl: 3  •  Montelukast Sodium (SINGULAIR) 10 MG Oral Tab, Take 1 tablet (10 mg total) by mouth nightly., Disp: 90 tablet, Rfl: 1  •  loratadine 10 MG Oral Tab, Take 1 tablet (10 mg total) by mouth daily. , Disp: 90 tablet, Rfl: 1  •  Insulin Pen Needle (BD Add[place], 33 1.2 week, 6 lb(s) Male / Glory Flaming ([address]) Baby sent to [facility] for 1 month due to breathing issues   • Pregnancy     8 hr labor  ; [facility]; 36 week 7 lb(s) Male; [facility] Dr Flory Candelario) baby had problems breathing a diabetes  -Discussed complications of diabetes include retinopathy, neuropathy, nephropathy and cardiovascular disease  -Discussed importance of SBGM  -Discussed importance of low CHO diet  -She has improved compliance since last visit  -Discussed at len

## 2018-07-18 NOTE — PATIENT INSTRUCTIONS
Levemir 80 units SQ daily    Admelog   INSULIN SLIDING SCALE  Base Values  Breakfast: 22  Lunch: 22  Dinner: 22  Ranges:  80-99: -2  100-119: 0  120-139: 0  140-159: 0  160-179: 1  180-199: 1  200-219: 2  220-239: 2  240-259: 3  260-279: 3  280-299: 4  300

## 2018-08-15 RX ORDER — INSULIN GLARGINE 100 [IU]/ML
80 INJECTION, SOLUTION SUBCUTANEOUS DAILY
Qty: 30 ML | Refills: 0 | Status: SHIPPED | OUTPATIENT
Start: 2018-08-15 | End: 2018-10-15

## 2018-10-10 RX ORDER — LOSARTAN POTASSIUM 50 MG/1
50 TABLET ORAL DAILY
Qty: 30 TABLET | Refills: 3 | Status: SHIPPED | OUTPATIENT
Start: 2018-10-10 | End: 2019-02-13

## 2018-10-15 DIAGNOSIS — E11.65 UNCONTROLLED TYPE 2 DIABETES MELLITUS WITH HYPERGLYCEMIA (HCC): ICD-10-CM

## 2018-10-15 DIAGNOSIS — Z79.4 TYPE 2 DIABETES MELLITUS WITH HYPERGLYCEMIA, WITH LONG-TERM CURRENT USE OF INSULIN (HCC): Primary | ICD-10-CM

## 2018-10-15 DIAGNOSIS — E11.65 TYPE 2 DIABETES MELLITUS WITH HYPERGLYCEMIA, WITH LONG-TERM CURRENT USE OF INSULIN (HCC): Primary | ICD-10-CM

## 2018-10-15 RX ORDER — INSULIN GLARGINE 100 [IU]/ML
80 INJECTION, SOLUTION SUBCUTANEOUS DAILY
Qty: 30 ML | Refills: 0 | Status: SHIPPED | OUTPATIENT
Start: 2018-10-15 | End: 2018-10-24

## 2018-10-24 ENCOUNTER — OFFICE VISIT (OUTPATIENT)
Dept: ENDOCRINOLOGY CLINIC | Facility: CLINIC | Age: 43
End: 2018-10-24
Payer: MEDICAID

## 2018-10-24 VITALS
WEIGHT: 208.63 LBS | DIASTOLIC BLOOD PRESSURE: 84 MMHG | HEART RATE: 101 BPM | BODY MASS INDEX: 39 KG/M2 | SYSTOLIC BLOOD PRESSURE: 141 MMHG

## 2018-10-24 DIAGNOSIS — E11.65 TYPE 2 DIABETES MELLITUS WITH HYPERGLYCEMIA, WITH LONG-TERM CURRENT USE OF INSULIN (HCC): ICD-10-CM

## 2018-10-24 DIAGNOSIS — E11.65 DIABETES MELLITUS TYPE 2, UNCONTROLLED, WITH COMPLICATIONS (HCC): Primary | ICD-10-CM

## 2018-10-24 DIAGNOSIS — E11.8 DIABETES MELLITUS TYPE 2, UNCONTROLLED, WITH COMPLICATIONS (HCC): Primary | ICD-10-CM

## 2018-10-24 DIAGNOSIS — Z79.4 TYPE 2 DIABETES MELLITUS WITH HYPERGLYCEMIA, WITH LONG-TERM CURRENT USE OF INSULIN (HCC): ICD-10-CM

## 2018-10-24 PROCEDURE — 36416 COLLJ CAPILLARY BLOOD SPEC: CPT | Performed by: INTERNAL MEDICINE

## 2018-10-24 PROCEDURE — 99212 OFFICE O/P EST SF 10 MIN: CPT | Performed by: INTERNAL MEDICINE

## 2018-10-24 PROCEDURE — 83036 HEMOGLOBIN GLYCOSYLATED A1C: CPT | Performed by: INTERNAL MEDICINE

## 2018-10-24 PROCEDURE — 99213 OFFICE O/P EST LOW 20 MIN: CPT | Performed by: INTERNAL MEDICINE

## 2018-10-24 PROCEDURE — 82962 GLUCOSE BLOOD TEST: CPT | Performed by: INTERNAL MEDICINE

## 2018-10-24 RX ORDER — INSULIN GLARGINE 100 [IU]/ML
80 INJECTION, SOLUTION SUBCUTANEOUS DAILY
Qty: 30 ML | Refills: 3 | Status: SHIPPED | OUTPATIENT
Start: 2018-10-24 | End: 2018-11-28

## 2018-10-24 RX ORDER — INSULIN LISPRO 100 U/ML
22 INJECTION, SOLUTION SUBCUTANEOUS
Qty: 30 ML | Refills: 3 | Status: SHIPPED | OUTPATIENT
Start: 2018-10-24 | End: 2018-11-28

## 2018-10-24 RX ORDER — FLUTICASONE PROPIONATE 50 MCG
SPRAY, SUSPENSION (ML) NASAL
Refills: 3 | COMMUNITY
Start: 2018-10-05 | End: 2020-02-10

## 2018-10-24 NOTE — PROGRESS NOTES
Name: Farhat Roblero  Date: 10/24/2018    Referring Physician: No ref. provider found    HISTORY OF PRESENT ILLNESS   Farhat Roblero is a 41year old female who presents for diabetes mellitus, diagnosed over 20 years ago.       She continues to miss a majorit (three) times daily with meals. , Disp: 30 mL, Rfl: 5  •  Insulin Lispro (ADMELOG SOLOSTAR) 100 UNIT/ML Subcutaneous Solution Pen-injector, Inject 22 Units into the skin 3 (three) times daily before meals.  Plus CF 1:20>140, Disp: 30 mL, Rfl: 5  •  loratadin file      Transportation needs - medical: Not on file      Transportation needs - non-medical: Not on file    Occupational History      Not on file    Tobacco Use      Smoking status: Never Smoker      Smokeless tobacco: Never Used    Substance and Sexual Musculoskeletal:  normal muscle strength and tone  Skin:  normal moisture and skin texture  Hair & Nails:  normal scalp hair     Hematologic:  no excessive bruising  Neuro:  sensory grossly intact and motor grossly intact  Psychiatric:  oriented to time,

## 2018-10-24 NOTE — PATIENT INSTRUCTIONS
basaglar 80 units SQ daily    Admelog  INSULIN SLIDING SCALE  Base Values  Breakfast: 22  Lunch: 22  Dinner: 22  Ranges:  80-99: -2  100-119: 0  120-139: 0  140-159: 1  160-179: 1  180-199: 2  200-219: 2  220-239: 3  240-259: 3  260-279: 4  280-299: 4  300

## 2018-10-26 NOTE — PATIENT INSTRUCTIONS
When your son sees the specialist find out if this person is able to write for medications or not.   If not ask them to help your son see a psychiatrist for possible medication management as things are not getting better at school and causing too much stres

## 2018-10-26 NOTE — PROGRESS NOTES
Patient ID: Michi Brunner is a 41year old female. HPI  Patient presents with: Anxiety      She is very stressed due to her son. He has quite a bit of stress and this causes her quite a bit of stress.   She does see Dr. Gini Asher the endocrinologist.  He mellitus ([facility] 11/24/2009   • Unspecified essential hypertension     stopped medication 11/09   • Urinary incontinence 2008    surgical       Past Surgical History:   Procedure Laterality Date   • INJECTION ([place])      injection tendon sheath, ligament Mami Peterson times daily Disp: 100 strip Rfl: 6   BD PEN NEEDLE HOSSEIN U/F 32G X 4 MM Does not apply Misc USE UTD Disp:  Rfl: 11   Glucose Blood (TIAGO CONTOUR TEST) In Vitro Strip Check BS twice daily Disp: 100 strip Rfl: 11   Insulin Pen Needle (NOVOFINE) 32G X 6 MM Do symptoms persist.  Take medicine (if given) as prescribed. Approach to treatment discussed and patient/family member understands and agrees to plan. Return in about 6 weeks (around 12/7/2018).       Armen Ochoa,   10/26/2018

## 2018-11-07 RX ORDER — ATORVASTATIN CALCIUM 20 MG/1
20 TABLET, FILM COATED ORAL NIGHTLY
Qty: 30 TABLET | Refills: 4 | Status: SHIPPED | OUTPATIENT
Start: 2018-11-07 | End: 2019-04-26

## 2018-11-13 DIAGNOSIS — Z79.4 TYPE 2 DIABETES MELLITUS WITH HYPERGLYCEMIA, WITH LONG-TERM CURRENT USE OF INSULIN (HCC): ICD-10-CM

## 2018-11-13 DIAGNOSIS — E11.65 TYPE 2 DIABETES MELLITUS WITH HYPERGLYCEMIA, WITH LONG-TERM CURRENT USE OF INSULIN (HCC): ICD-10-CM

## 2018-11-13 RX ORDER — INSULIN GLARGINE 100 [IU]/ML
80 INJECTION, SOLUTION SUBCUTANEOUS DAILY
Qty: 30 ML | Refills: 0 | Status: SHIPPED | OUTPATIENT
Start: 2018-11-13 | End: 2018-11-28

## 2018-11-28 ENCOUNTER — OFFICE VISIT (OUTPATIENT)
Dept: ENDOCRINOLOGY CLINIC | Facility: CLINIC | Age: 43
End: 2018-11-28
Payer: MEDICAID

## 2018-11-28 VITALS
DIASTOLIC BLOOD PRESSURE: 82 MMHG | BODY MASS INDEX: 39.05 KG/M2 | HEIGHT: 61 IN | WEIGHT: 206.81 LBS | HEART RATE: 87 BPM | SYSTOLIC BLOOD PRESSURE: 127 MMHG

## 2018-11-28 DIAGNOSIS — E11.319 TYPE 2 DIABETES MELLITUS WITH RETINOPATHY WITHOUT MACULAR EDEMA, WITH LONG-TERM CURRENT USE OF INSULIN, UNSPECIFIED LATERALITY, UNSPECIFIED RETINOPATHY SEVERITY (HCC): Primary | ICD-10-CM

## 2018-11-28 DIAGNOSIS — E11.65 TYPE 2 DIABETES MELLITUS WITH HYPERGLYCEMIA, WITH LONG-TERM CURRENT USE OF INSULIN (HCC): ICD-10-CM

## 2018-11-28 DIAGNOSIS — Z79.4 TYPE 2 DIABETES MELLITUS WITH RETINOPATHY WITHOUT MACULAR EDEMA, WITH LONG-TERM CURRENT USE OF INSULIN, UNSPECIFIED LATERALITY, UNSPECIFIED RETINOPATHY SEVERITY (HCC): Primary | ICD-10-CM

## 2018-11-28 DIAGNOSIS — Z79.4 TYPE 2 DIABETES MELLITUS WITH HYPERGLYCEMIA, WITH LONG-TERM CURRENT USE OF INSULIN (HCC): ICD-10-CM

## 2018-11-28 DIAGNOSIS — IMO0001 UNCONTROLLED TYPE 2 DIABETES MELLITUS WITHOUT COMPLICATION, WITH LONG-TERM CURRENT USE OF INSULIN: ICD-10-CM

## 2018-11-28 PROCEDURE — 83036 HEMOGLOBIN GLYCOSYLATED A1C: CPT | Performed by: NURSE PRACTITIONER

## 2018-11-28 PROCEDURE — 36416 COLLJ CAPILLARY BLOOD SPEC: CPT | Performed by: NURSE PRACTITIONER

## 2018-11-28 PROCEDURE — 82962 GLUCOSE BLOOD TEST: CPT | Performed by: NURSE PRACTITIONER

## 2018-11-28 PROCEDURE — 99213 OFFICE O/P EST LOW 20 MIN: CPT | Performed by: NURSE PRACTITIONER

## 2018-11-28 PROCEDURE — 99212 OFFICE O/P EST SF 10 MIN: CPT | Performed by: NURSE PRACTITIONER

## 2018-11-28 RX ORDER — INSULIN GLARGINE 100 [IU]/ML
82 INJECTION, SOLUTION SUBCUTANEOUS DAILY
Qty: 30 ML | Refills: 0 | Status: SHIPPED | OUTPATIENT
Start: 2018-11-28 | End: 2019-01-18

## 2018-11-28 NOTE — PATIENT INSTRUCTIONS
Medications for DM   Basaglar  82 units SC daily every morning   Admelog  26 units SC three times a day with meals    Send blood sugars next week on Wednesday     Call if blood sugar less than 110

## 2018-11-28 NOTE — PROGRESS NOTES
Name: Aviva Kraus  Date: 11/28/2018    Referring Physician: No ref. provider found    HISTORY OF PRESENT ILLNESS   Aviva Kraus is a 41year old female who presents for diabetes mellitus, diagnosed over 20 years ago.       She continues to miss a majorit apply Kit, 1 kit by Does not apply route daily. , Disp: 1 kit, Rfl: 0  •  Insulin Pen Needle (BD PEN NEEDLE HOSSEIN U/F) 32G X 4 MM Does not apply Misc, Use with insulin pen TID or as directed., Disp: 100 each, Rfl: 2  •  Glucose Blood In Vitro Strip, Check BS Drug use: No      Medical History:   Past Medical History:   Diagnosis Date   • Amblyopia     OD   • Diabetes ([facility]    • Hyperlipidemia    • Pregnancy     12 hr labor , [facility], 33 1.2 week, 6 lb(s) Male / Kathy Rikki ([address] slow improvement in A1C   -Discussed importance of glycemic control to prevent complications of diabetes  -Discussed complications of diabetes include retinopathy, neuropathy, nephropathy and cardiovascular disease  -Discussed importance of SBGM  -Discusse

## 2018-12-07 NOTE — PATIENT INSTRUCTIONS
You need to do the labs from July 18, 2018 per Dr. Shen Cardona. Make sure to fast for 10 hours. You can have your medicines in the morning along with some water. HEALTH TIPS     Exercise, work on your diet, watch your portion sizes.   Luis

## 2018-12-07 NOTE — PROGRESS NOTES
Patient ID: Seven Boland is a 41year old female. HPI  Patient presents with: Anxiety  Depression     She states she is less stressed with her son as the therapist is helping him as well and he is behaving better.   She feels that venlafaxine is also [facility]; 36 week 7 lb(s) Male; Rizwan Brochure [facility] Dr Blaze Christensen) baby had problems breathing and remained in hospital for 2 weeks   • Pregnancy     spontaneous , unknown sex, SAB at 6 weeks no D&C   • Pregnancy     D&C 2009   • Type I Does not apply route daily.  Disp: 1 kit Rfl: 0   BD PEN NEEDLE HOSSEIN U/F 32G X 4 MM Does not apply Misc USE UTD Disp:  Rfl: 11   Insulin Pen Needle (NOVOFINE) 32G X 6 MM Does not apply Misc use with insulin pen as directed Disp:  Rfl:      Allergies:No Know liver ordered  Vomiting without nausea, intractability of vomiting not specified, unspecified vomiting type  -     US LIVER (CPT=76705); Future    Fatty liver  -     US LIVER (CPT=76705);  Future        Referrals (if applicable)  No orders of the defined ty

## 2019-01-16 RX ORDER — LANCETS
EACH MISCELLANEOUS
Qty: 100 EACH | Refills: 11 | Status: SHIPPED | OUTPATIENT
Start: 2019-01-16 | End: 2019-12-03

## 2019-01-18 DIAGNOSIS — E11.319 TYPE 2 DIABETES MELLITUS WITH RETINOPATHY WITHOUT MACULAR EDEMA, WITH LONG-TERM CURRENT USE OF INSULIN, UNSPECIFIED LATERALITY, UNSPECIFIED RETINOPATHY SEVERITY (HCC): ICD-10-CM

## 2019-01-18 DIAGNOSIS — Z79.4 TYPE 2 DIABETES MELLITUS WITH HYPERGLYCEMIA, WITH LONG-TERM CURRENT USE OF INSULIN (HCC): ICD-10-CM

## 2019-01-18 DIAGNOSIS — Z79.4 TYPE 2 DIABETES MELLITUS WITH RETINOPATHY WITHOUT MACULAR EDEMA, WITH LONG-TERM CURRENT USE OF INSULIN, UNSPECIFIED LATERALITY, UNSPECIFIED RETINOPATHY SEVERITY (HCC): ICD-10-CM

## 2019-01-18 DIAGNOSIS — E11.65 TYPE 2 DIABETES MELLITUS WITH HYPERGLYCEMIA, WITH LONG-TERM CURRENT USE OF INSULIN (HCC): ICD-10-CM

## 2019-01-18 RX ORDER — INSULIN GLARGINE 100 [IU]/ML
82 INJECTION, SOLUTION SUBCUTANEOUS DAILY
Qty: 30 ML | Refills: 5 | Status: SHIPPED | OUTPATIENT
Start: 2019-01-18 | End: 2019-04-04

## 2019-01-23 DIAGNOSIS — Z79.4 TYPE 2 DIABETES MELLITUS WITH RETINOPATHY WITHOUT MACULAR EDEMA, WITH LONG-TERM CURRENT USE OF INSULIN, UNSPECIFIED LATERALITY, UNSPECIFIED RETINOPATHY SEVERITY (HCC): ICD-10-CM

## 2019-01-23 DIAGNOSIS — E11.319 TYPE 2 DIABETES MELLITUS WITH RETINOPATHY WITHOUT MACULAR EDEMA, WITH LONG-TERM CURRENT USE OF INSULIN, UNSPECIFIED LATERALITY, UNSPECIFIED RETINOPATHY SEVERITY (HCC): ICD-10-CM

## 2019-01-23 RX ORDER — PEN NEEDLE, DIABETIC 30 GX3/16"
NEEDLE, DISPOSABLE MISCELLANEOUS
Qty: 100 EACH | Refills: 2 | Status: SHIPPED | OUTPATIENT
Start: 2019-01-23 | End: 2019-04-30

## 2019-01-23 NOTE — TELEPHONE ENCOUNTER
LOV 11/28/2018. Per [facility] protocol can be refilled x 6 months. Did send reminder to patient regarding appt.

## 2019-02-13 RX ORDER — LOSARTAN POTASSIUM 50 MG/1
50 TABLET ORAL DAILY
Qty: 30 TABLET | Refills: 3 | Status: SHIPPED | OUTPATIENT
Start: 2019-02-13 | End: 2019-07-11

## 2019-04-03 ENCOUNTER — TELEPHONE (OUTPATIENT)
Dept: ENDOCRINOLOGY CLINIC | Facility: CLINIC | Age: 44
End: 2019-04-03

## 2019-04-03 DIAGNOSIS — E11.319 TYPE 2 DIABETES MELLITUS WITH RETINOPATHY WITHOUT MACULAR EDEMA, WITH LONG-TERM CURRENT USE OF INSULIN, UNSPECIFIED LATERALITY, UNSPECIFIED RETINOPATHY SEVERITY (HCC): ICD-10-CM

## 2019-04-03 DIAGNOSIS — Z79.4 TYPE 2 DIABETES MELLITUS WITH RETINOPATHY WITHOUT MACULAR EDEMA, WITH LONG-TERM CURRENT USE OF INSULIN, UNSPECIFIED LATERALITY, UNSPECIFIED RETINOPATHY SEVERITY (HCC): ICD-10-CM

## 2019-04-04 ENCOUNTER — TELEPHONE (OUTPATIENT)
Dept: ENDOCRINOLOGY CLINIC | Facility: CLINIC | Age: 44
End: 2019-04-04

## 2019-04-04 DIAGNOSIS — Z79.4 TYPE 2 DIABETES MELLITUS WITH RETINOPATHY WITHOUT MACULAR EDEMA, WITH LONG-TERM CURRENT USE OF INSULIN, UNSPECIFIED LATERALITY, UNSPECIFIED RETINOPATHY SEVERITY (HCC): ICD-10-CM

## 2019-04-04 DIAGNOSIS — E11.319 TYPE 2 DIABETES MELLITUS WITH RETINOPATHY WITHOUT MACULAR EDEMA, WITH LONG-TERM CURRENT USE OF INSULIN, UNSPECIFIED LATERALITY, UNSPECIFIED RETINOPATHY SEVERITY (HCC): ICD-10-CM

## 2019-04-04 RX ORDER — INSULIN GLARGINE 100 [IU]/ML
82 INJECTION, SOLUTION SUBCUTANEOUS NIGHTLY
Qty: 30 ML | Refills: 1 | Status: SHIPPED | OUTPATIENT
Start: 2019-04-04 | End: 2019-04-05

## 2019-04-04 RX ORDER — INSULIN LISPRO 100 [IU]/ML
24 INJECTION, SOLUTION INTRAVENOUS; SUBCUTANEOUS 3 TIMES DAILY
Qty: 30 ML | Refills: 1 | Status: SHIPPED | OUTPATIENT
Start: 2019-04-04 | End: 2019-07-03

## 2019-04-04 RX ORDER — INSULIN LISPRO 100 U/ML
INJECTION, SOLUTION SUBCUTANEOUS
Qty: 30 ML | Refills: 5 | Status: SHIPPED | OUTPATIENT
Start: 2019-04-04 | End: 2019-08-07

## 2019-04-04 RX ORDER — INSULIN LISPRO 100 U/ML
INJECTION, SOLUTION SUBCUTANEOUS
Qty: 30 ML | Refills: 5 | Status: SHIPPED | OUTPATIENT
Start: 2019-04-04 | End: 2019-04-04

## 2019-04-04 NOTE — TELEPHONE ENCOUNTER
[facility] requesting rx for Lantus instead of Basaglar insulin (not covered by insurance). PLs call. Thank you.

## 2019-04-04 NOTE — TELEPHONE ENCOUNTER
LOV 11/28/18 - No F/U (RTC 1 mo)    Pended alternative med at same dose for LG - Novolog will be cheaper for pt per pharm - do not fill Admelog    Pt states she lost insurance - won't be effective until June so she is unable to sched. F/u Apt.  She states s

## 2019-04-05 RX ORDER — INSULIN GLARGINE 100 [IU]/ML
82 INJECTION, SOLUTION SUBCUTANEOUS NIGHTLY
Qty: 30 ML | Refills: 1 | Status: SHIPPED | OUTPATIENT
Start: 2019-04-05 | End: 2019-05-09

## 2019-04-18 ENCOUNTER — HOSPITAL ENCOUNTER (OUTPATIENT)
Age: 44
Discharge: HOME | End: 2019-04-18
Attending: FAMILY MEDICINE
Payer: MEDICAID

## 2019-04-18 VITALS
OXYGEN SATURATION: 95 % | SYSTOLIC BLOOD PRESSURE: 133 MMHG | BODY MASS INDEX: 40 KG/M2 | RESPIRATION RATE: 18 BRPM | TEMPERATURE: 99 F | DIASTOLIC BLOOD PRESSURE: 82 MMHG | HEART RATE: 104 BPM | WEIGHT: 211 LBS

## 2019-04-18 DIAGNOSIS — H10.31 ACUTE BACTERIAL CONJUNCTIVITIS OF RIGHT EYE: Primary | ICD-10-CM

## 2019-04-18 PROCEDURE — 99202 OFFICE O/P NEW SF 15 MIN: CPT

## 2019-04-18 PROCEDURE — 99213 OFFICE O/P EST LOW 20 MIN: CPT

## 2019-04-18 RX ORDER — TOBRAMYCIN 3 MG/ML
2 SOLUTION/ DROPS OPHTHALMIC
Qty: 1 BOTTLE | Refills: 0 | Status: SHIPPED | OUTPATIENT
Start: 2019-04-18 | End: 2019-04-23

## 2019-04-18 RX ORDER — TOBRAMYCIN 3 MG/ML
2 SOLUTION/ DROPS OPHTHALMIC
Qty: 1 BOTTLE | Refills: 0 | Status: SHIPPED | OUTPATIENT
Start: 2019-04-18 | End: 2019-04-18

## 2019-04-18 NOTE — ED PROVIDER NOTES
Patient Seen in: [facility] In [address] [place] [address]    History   Patient presents with: Eye Visual Problem (opthalmic)    Stated Complaint: rt eye pain    HPI    Pt complains of right d/c and redness for 3 days .    Associated symptoms : no visua loratadine 10 MG Oral Tab,  Take 1 tablet (10 mg total) by mouth daily. ADMELOG SOLOSTAR 100 UNIT/ML Subcutaneous Solution Pen-injector,  Inject 24 Units into the skin 3 (three) times daily before meals.    PEN NEEDLES 32G X 4 MM Does not apply Misc,  Use side  Conjunctiva: injected on right  Cornea: nl  EOMI intact PERRLA  Ant chambers: nl inspection    ENT:  mmm, no lesions  Neck: supple, no LAD  Skin: warm and dry, no rash, no laceration            ED Course   Labs Reviewed - No data to display    MDM

## 2019-04-26 RX ORDER — ATORVASTATIN CALCIUM 20 MG/1
20 TABLET, FILM COATED ORAL NIGHTLY
Qty: 90 TABLET | Refills: 1 | Status: SHIPPED | OUTPATIENT
Start: 2019-04-26 | End: 2019-09-03

## 2019-04-27 DIAGNOSIS — F32.A ANXIETY AND DEPRESSION: ICD-10-CM

## 2019-04-27 DIAGNOSIS — F41.9 ANXIETY AND DEPRESSION: ICD-10-CM

## 2019-04-27 RX ORDER — VENLAFAXINE HYDROCHLORIDE 75 MG/1
75 CAPSULE, EXTENDED RELEASE ORAL DAILY
Qty: 90 CAPSULE | Refills: 1 | OUTPATIENT
Start: 2019-04-27

## 2019-04-30 DIAGNOSIS — E11.319 TYPE 2 DIABETES MELLITUS WITH RETINOPATHY WITHOUT MACULAR EDEMA, WITH LONG-TERM CURRENT USE OF INSULIN, UNSPECIFIED LATERALITY, UNSPECIFIED RETINOPATHY SEVERITY (HCC): ICD-10-CM

## 2019-04-30 DIAGNOSIS — Z79.4 TYPE 2 DIABETES MELLITUS WITH RETINOPATHY WITHOUT MACULAR EDEMA, WITH LONG-TERM CURRENT USE OF INSULIN, UNSPECIFIED LATERALITY, UNSPECIFIED RETINOPATHY SEVERITY (HCC): ICD-10-CM

## 2019-04-30 RX ORDER — PEN NEEDLE, DIABETIC 30 GX3/16"
1 NEEDLE, DISPOSABLE MISCELLANEOUS 4 TIMES DAILY
Qty: 150 EACH | Refills: 5 | Status: SHIPPED | OUTPATIENT
Start: 2019-04-30

## 2019-04-30 NOTE — TELEPHONE ENCOUNTER
LOV 11/28/18 with RTC 1 month    Called patient to offer to schedule follow up. She has temporary insurance that does not cover office visits, but will have previous insurance re-instated in June. She will be able to schedule follow up at that time.      Re

## 2019-05-10 RX ORDER — INSULIN GLARGINE 100 [IU]/ML
82 INJECTION, SOLUTION SUBCUTANEOUS NIGHTLY
Qty: 30 ML | Refills: 0 | Status: SHIPPED | OUTPATIENT
Start: 2019-05-10 | End: 2019-07-03

## 2019-05-28 DIAGNOSIS — F32.A ANXIETY AND DEPRESSION: ICD-10-CM

## 2019-05-28 DIAGNOSIS — F41.9 ANXIETY AND DEPRESSION: ICD-10-CM

## 2019-05-29 RX ORDER — VENLAFAXINE HYDROCHLORIDE 75 MG/1
75 CAPSULE, EXTENDED RELEASE ORAL DAILY
Qty: 90 CAPSULE | Refills: 1 | Status: SHIPPED | OUTPATIENT
Start: 2019-05-29 | End: 2019-09-21

## 2019-07-03 ENCOUNTER — OFFICE VISIT (OUTPATIENT)
Dept: ENDOCRINOLOGY CLINIC | Facility: CLINIC | Age: 44
End: 2019-07-03
Payer: MEDICAID

## 2019-07-03 VITALS
BODY MASS INDEX: 40 KG/M2 | HEART RATE: 98 BPM | SYSTOLIC BLOOD PRESSURE: 134 MMHG | DIASTOLIC BLOOD PRESSURE: 80 MMHG | WEIGHT: 212.81 LBS

## 2019-07-03 DIAGNOSIS — E78.5 HYPERLIPIDEMIA, UNSPECIFIED HYPERLIPIDEMIA TYPE: ICD-10-CM

## 2019-07-03 DIAGNOSIS — Z79.4 TYPE 2 DIABETES MELLITUS WITH HYPERGLYCEMIA, WITH LONG-TERM CURRENT USE OF INSULIN (HCC): Primary | ICD-10-CM

## 2019-07-03 DIAGNOSIS — E11.65 TYPE 2 DIABETES MELLITUS WITH HYPERGLYCEMIA, WITH LONG-TERM CURRENT USE OF INSULIN (HCC): Primary | ICD-10-CM

## 2019-07-03 DIAGNOSIS — I10 ESSENTIAL HYPERTENSION: ICD-10-CM

## 2019-07-03 LAB
CARTRIDGE LOT#: ABNORMAL NUMERIC
GLUCOSE BLOOD: 133
HEMOGLOBIN A1C: 9.3 % (ref 4.3–5.6)
TEST STRIP LOT #: NORMAL NUMERIC

## 2019-07-03 PROCEDURE — 99215 OFFICE O/P EST HI 40 MIN: CPT | Performed by: NURSE PRACTITIONER

## 2019-07-03 PROCEDURE — 83036 HEMOGLOBIN GLYCOSYLATED A1C: CPT | Performed by: NURSE PRACTITIONER

## 2019-07-03 PROCEDURE — 36416 COLLJ CAPILLARY BLOOD SPEC: CPT | Performed by: NURSE PRACTITIONER

## 2019-07-03 PROCEDURE — 82962 GLUCOSE BLOOD TEST: CPT | Performed by: NURSE PRACTITIONER

## 2019-07-03 NOTE — PATIENT INSTRUCTIONS
Basaglar 82 units SQ daily at hs -----88 units   Admelog 26 units SQ TID with meals     ADD Truilicity 6.01 mg SC inject weekly     Follow up in one month

## 2019-07-03 NOTE — PROGRESS NOTES
Name: Scott Zelaya  Date: 7/3/2019    Referring Physician: Hilary Washingtontiffanie is a 45year old female who presents for diabetes mellitus, diagnosed over 20 years ago.       Patient missed several months of insulin due to l glargine (BASAGLAR KWIKPEN) 100 UNIT/ML Subcutaneous Solution Pen-injector, Inject 82 Units into the skin nightly., Disp: , Rfl:   •  Dulaglutide (TRULICITY) 9.41 LP/0.4EJ Subcutaneous Solution Pen-injector, Inject 0.75 mg into the skin once a week for 4 d Marital status: Single      Spouse name: Not on file      Number of children: Not on file      Years of education: Not on file      Highest education level: Not on file    Tobacco Use      Smoking status: Never Smoker      Smokeless tobacco: Never Used Hematologic:  no excessive bruising  Neuro:  sensory grossly intact and motor grossly intact  Psychiatric:  oriented to time, self, and place  Nutritional:  no abnormal weight gain or loss    ASSESSMENT/PLAN:      1.  Diabetes Mellitus Type 2, Uncontrol of hypoglycemia / evaluation s/s and treatment     Reviewed Risk of Hypoglycemia / Recognition & Treatment     -Reviewed Mechanism of Action/ Risk Benefit / Side effects of each medication     -Patient verbalized understanding and is in agreement to plan f

## 2019-07-11 RX ORDER — LOSARTAN POTASSIUM 50 MG/1
50 TABLET ORAL DAILY
Qty: 30 TABLET | Refills: 3 | Status: SHIPPED | OUTPATIENT
Start: 2019-07-11 | End: 2019-10-21

## 2019-07-29 RX ORDER — DULAGLUTIDE 0.75 MG/.5ML
INJECTION, SOLUTION SUBCUTANEOUS
Qty: 4 PEN | Refills: 0 | Status: SHIPPED | OUTPATIENT
Start: 2019-07-29 | End: 2019-08-07

## 2019-08-07 ENCOUNTER — OFFICE VISIT (OUTPATIENT)
Dept: ENDOCRINOLOGY CLINIC | Facility: CLINIC | Age: 44
End: 2019-08-07
Payer: MEDICAID

## 2019-08-07 VITALS
DIASTOLIC BLOOD PRESSURE: 67 MMHG | SYSTOLIC BLOOD PRESSURE: 133 MMHG | BODY MASS INDEX: 41 KG/M2 | HEART RATE: 109 BPM | WEIGHT: 215 LBS

## 2019-08-07 DIAGNOSIS — E11.319 TYPE 2 DIABETES MELLITUS WITH RETINOPATHY WITHOUT MACULAR EDEMA, WITH LONG-TERM CURRENT USE OF INSULIN, UNSPECIFIED LATERALITY, UNSPECIFIED RETINOPATHY SEVERITY (HCC): ICD-10-CM

## 2019-08-07 DIAGNOSIS — Z79.4 TYPE 2 DIABETES MELLITUS WITH RETINOPATHY WITHOUT MACULAR EDEMA, WITH LONG-TERM CURRENT USE OF INSULIN, UNSPECIFIED LATERALITY, UNSPECIFIED RETINOPATHY SEVERITY (HCC): ICD-10-CM

## 2019-08-07 DIAGNOSIS — E11.65 TYPE 2 DIABETES MELLITUS WITH HYPERGLYCEMIA, WITH LONG-TERM CURRENT USE OF INSULIN (HCC): Primary | ICD-10-CM

## 2019-08-07 DIAGNOSIS — Z79.4 TYPE 2 DIABETES MELLITUS WITH HYPERGLYCEMIA, WITH LONG-TERM CURRENT USE OF INSULIN (HCC): Primary | ICD-10-CM

## 2019-08-07 DIAGNOSIS — I10 ESSENTIAL HYPERTENSION: ICD-10-CM

## 2019-08-07 DIAGNOSIS — E78.5 HYPERLIPIDEMIA, UNSPECIFIED HYPERLIPIDEMIA TYPE: ICD-10-CM

## 2019-08-07 LAB
CARTRIDGE EXPIRATION DATE: ABNORMAL DATE
CARTRIDGE LOT#: ABNORMAL NUMERIC
GLUCOSE BLOOD: 119
HEMOGLOBIN A1C: 9.1 % (ref 4.3–5.6)
TEST STRIP EXPIRATION DATE: NORMAL DATE
TEST STRIP LOT #: NORMAL NUMERIC

## 2019-08-07 PROCEDURE — 83036 HEMOGLOBIN GLYCOSYLATED A1C: CPT | Performed by: NURSE PRACTITIONER

## 2019-08-07 PROCEDURE — 99215 OFFICE O/P EST HI 40 MIN: CPT | Performed by: NURSE PRACTITIONER

## 2019-08-07 PROCEDURE — 82962 GLUCOSE BLOOD TEST: CPT | Performed by: NURSE PRACTITIONER

## 2019-08-07 PROCEDURE — 36416 COLLJ CAPILLARY BLOOD SPEC: CPT | Performed by: NURSE PRACTITIONER

## 2019-08-07 NOTE — PATIENT INSTRUCTIONS
Medications for DM   Basaglar 88 units SQ daily at hs----90 units   Admelog 26-30 units SQ TID with meals -----30 TID   Truilicity 4.30 mg SC inject weekly ---- 1.5 mg     2 month follow up Dr Cesar Galvan

## 2019-08-07 NOTE — PROGRESS NOTES
Name: Jaquelyn Duverney  Date: 8/7/2019    Referring Physician: Kylee De La Rosa is a 45year old female who presents for diabetes mellitus follow up , diagnosed over 20 years ago.  On insulin therapy 8 years, continued on in Peripheral Vascular Disease present: No    : Nephropathy present: No    Neuro: Neuropathy present: Yes, tried lyrica but didn't tolerate due to dizziness    Skin: Infection or ulceration: No    Osteoporosis: No    Thyroid disease: No    Medications: BD PEN NEEDLE HOSSEIN U/F 32G X 4 MM Does not apply Misc, USE UTD, Disp: , Rfl: 11  •  Insulin Pen Needle (NOVOFINE) 32G X 6 MM Does not apply Misc, use with insulin pen as directed, Disp: , Rfl:      Allergies:   No Known Allergies    Social History:   Soci bilaterally  Cardiovascular:  regular rate, rhythm, , no murmurs, S3 or S4  Gastrointestinal:  normal bowel sounds and no palpable masses in abdomen, organomegaly or tenderness   Musculoskeletal:  normal muscle strength and tone  Skin:  normal moisture and hx of gatroparesis/ pancreatitis/ MENST2/ thyroid ca    -Reviewed to call if Gi upset/ reviewed can decrease back to half dose if not tolerated and can give antinausea if nauseated again with increase in dose/ reviewed with Mauritian interrupter     Reviewed

## 2019-08-08 RX ORDER — PEN NEEDLE, DIABETIC 32GX 5/32"
NEEDLE, DISPOSABLE MISCELLANEOUS
Qty: 100 EACH | Refills: 2 | Status: SHIPPED | OUTPATIENT
Start: 2019-08-08 | End: 2019-10-30

## 2019-09-03 RX ORDER — ATORVASTATIN CALCIUM 20 MG/1
20 TABLET, FILM COATED ORAL NIGHTLY
Qty: 90 TABLET | Refills: 1 | Status: SHIPPED | OUTPATIENT
Start: 2019-09-03 | End: 2020-02-10

## 2019-09-09 RX ORDER — INSULIN GLARGINE 100 [IU]/ML
80 INJECTION, SOLUTION SUBCUTANEOUS DAILY
Qty: 30 ML | Refills: 0 | Status: SHIPPED | OUTPATIENT
Start: 2019-09-09 | End: 2019-10-08

## 2019-09-21 DIAGNOSIS — F32.A ANXIETY AND DEPRESSION: ICD-10-CM

## 2019-09-21 DIAGNOSIS — F41.9 ANXIETY AND DEPRESSION: ICD-10-CM

## 2019-09-22 RX ORDER — VENLAFAXINE HYDROCHLORIDE 75 MG/1
75 CAPSULE, EXTENDED RELEASE ORAL DAILY
Qty: 90 CAPSULE | Refills: 0 | Status: SHIPPED | OUTPATIENT
Start: 2019-09-22 | End: 2019-10-21

## 2019-09-30 ENCOUNTER — TELEPHONE (OUTPATIENT)
Dept: ENDOCRINOLOGY CLINIC | Facility: CLINIC | Age: 44
End: 2019-09-30

## 2019-09-30 NOTE — TELEPHONE ENCOUNTER
Current Outpatient Medications:    Refill request for Trulicity 6.4HQ .5ml not on med list pls clarify

## 2019-10-09 RX ORDER — INSULIN GLARGINE 100 [IU]/ML
80 INJECTION, SOLUTION SUBCUTANEOUS DAILY
Qty: 30 ML | Refills: 0 | Status: SHIPPED | OUTPATIENT
Start: 2019-10-09 | End: 2019-11-14

## 2019-10-16 ENCOUNTER — OFFICE VISIT (OUTPATIENT)
Dept: ENDOCRINOLOGY CLINIC | Facility: CLINIC | Age: 44
End: 2019-10-16
Payer: MEDICAID

## 2019-10-16 ENCOUNTER — APPOINTMENT (OUTPATIENT)
Dept: LAB | Age: 44
End: 2019-10-16
Attending: INTERNAL MEDICINE
Payer: MEDICAID

## 2019-10-16 VITALS
SYSTOLIC BLOOD PRESSURE: 128 MMHG | BODY MASS INDEX: 40 KG/M2 | DIASTOLIC BLOOD PRESSURE: 84 MMHG | HEART RATE: 103 BPM | WEIGHT: 210 LBS

## 2019-10-16 DIAGNOSIS — E11.65 DIABETES MELLITUS TYPE 2, UNCONTROLLED, WITH COMPLICATIONS (HCC): ICD-10-CM

## 2019-10-16 DIAGNOSIS — E11.8 DIABETES MELLITUS TYPE 2, UNCONTROLLED, WITH COMPLICATIONS (HCC): ICD-10-CM

## 2019-10-16 DIAGNOSIS — E11.8 DIABETES MELLITUS TYPE 2, UNCONTROLLED, WITH COMPLICATIONS (HCC): Primary | ICD-10-CM

## 2019-10-16 DIAGNOSIS — E11.65 DIABETES MELLITUS TYPE 2, UNCONTROLLED, WITH COMPLICATIONS (HCC): Primary | ICD-10-CM

## 2019-10-16 PROCEDURE — 82962 GLUCOSE BLOOD TEST: CPT | Performed by: INTERNAL MEDICINE

## 2019-10-16 PROCEDURE — 80061 LIPID PANEL: CPT

## 2019-10-16 PROCEDURE — 80053 COMPREHEN METABOLIC PANEL: CPT

## 2019-10-16 PROCEDURE — 36416 COLLJ CAPILLARY BLOOD SPEC: CPT | Performed by: INTERNAL MEDICINE

## 2019-10-16 PROCEDURE — 83036 HEMOGLOBIN GLYCOSYLATED A1C: CPT | Performed by: INTERNAL MEDICINE

## 2019-10-16 PROCEDURE — 36415 COLL VENOUS BLD VENIPUNCTURE: CPT

## 2019-10-16 PROCEDURE — 84443 ASSAY THYROID STIM HORMONE: CPT

## 2019-10-16 PROCEDURE — 99214 OFFICE O/P EST MOD 30 MIN: CPT | Performed by: INTERNAL MEDICINE

## 2019-10-16 PROCEDURE — 82043 UR ALBUMIN QUANTITATIVE: CPT

## 2019-10-16 PROCEDURE — 82570 ASSAY OF URINE CREATININE: CPT

## 2019-10-16 NOTE — PROGRESS NOTES
Name: Ricardo Huitron  Date: 10/16/2019    Referring Physician: No ref. provider found    HISTORY OF PRESENT ILLNESS   Ricardo Huitron is a 45year old female who presents for diabetes mellitus, diagnosed over 20 years ago.       She continues to miss a majorit STRESS. TAKE EVERY MORNING., Disp: 90 capsule, Rfl: 0  •  ATORVASTATIN 20 MG Oral Tab, TAKE 1 TABLET (20 MG TOTAL) BY MOUTH NIGHTLY.  ., Disp: 90 tablet, Rfl: 1  •  TRUEPLUS PEN NEEDLES 32G X 4 MM Does not apply Misc, USE TO INJECT INSULIN THREE TIMES A DAY status: Single      Spouse name: Not on file      Number of children: Not on file      Years of education: Not on file      Highest education level: Not on file    Tobacco Use      Smoking status: Never Smoker      Smokeless tobacco: Never Used    [employer] Hematologic:  no excessive bruising  Neuro:  sensory grossly intact and motor grossly intact  Psychiatric:  oriented to time, self, and place  Nutritional:  no abnormal weight gain or loss    ASSESSMENT/PLAN:      1.  Diabetes Mellitus Type 2, Uncontrolle

## 2019-10-17 ENCOUNTER — TELEPHONE (OUTPATIENT)
Dept: ENDOCRINOLOGY CLINIC | Facility: CLINIC | Age: 44
End: 2019-10-17

## 2019-10-17 NOTE — TELEPHONE ENCOUNTER
Please call patient - good news, overall labs are at goal.  Normal kidney and liver function, normal cholesterol levels. Normal thyroid function. Please continue current plan.

## 2019-10-21 ENCOUNTER — OFFICE VISIT (OUTPATIENT)
Dept: FAMILY MEDICINE CLINIC | Facility: CLINIC | Age: 44
End: 2019-10-21
Payer: MEDICAID

## 2019-10-21 VITALS
HEIGHT: 61 IN | DIASTOLIC BLOOD PRESSURE: 76 MMHG | TEMPERATURE: 99 F | BODY MASS INDEX: 40.02 KG/M2 | WEIGHT: 212 LBS | HEART RATE: 104 BPM | SYSTOLIC BLOOD PRESSURE: 124 MMHG

## 2019-10-21 DIAGNOSIS — F41.9 ANXIETY AND DEPRESSION: ICD-10-CM

## 2019-10-21 DIAGNOSIS — E78.2 MIXED HYPERLIPIDEMIA: ICD-10-CM

## 2019-10-21 DIAGNOSIS — F32.A ANXIETY AND DEPRESSION: ICD-10-CM

## 2019-10-21 DIAGNOSIS — R10.13 EPIGASTRIC ABDOMINAL PAIN: ICD-10-CM

## 2019-10-21 DIAGNOSIS — K21.9 GASTROESOPHAGEAL REFLUX DISEASE, ESOPHAGITIS PRESENCE NOT SPECIFIED: ICD-10-CM

## 2019-10-21 DIAGNOSIS — I10 ESSENTIAL HYPERTENSION: ICD-10-CM

## 2019-10-21 DIAGNOSIS — E11.65 UNCONTROLLED TYPE 2 DIABETES MELLITUS WITH HYPERGLYCEMIA (HCC): Primary | ICD-10-CM

## 2019-10-21 DIAGNOSIS — R14.0 ABDOMINAL BLOATING: ICD-10-CM

## 2019-10-21 DIAGNOSIS — Z23 NEED FOR VACCINATION: ICD-10-CM

## 2019-10-21 PROCEDURE — 90732 PPSV23 VACC 2 YRS+ SUBQ/IM: CPT | Performed by: FAMILY MEDICINE

## 2019-10-21 PROCEDURE — 90686 IIV4 VACC NO PRSV 0.5 ML IM: CPT | Performed by: FAMILY MEDICINE

## 2019-10-21 PROCEDURE — 90472 IMMUNIZATION ADMIN EACH ADD: CPT | Performed by: FAMILY MEDICINE

## 2019-10-21 PROCEDURE — 99214 OFFICE O/P EST MOD 30 MIN: CPT | Performed by: FAMILY MEDICINE

## 2019-10-21 PROCEDURE — 90471 IMMUNIZATION ADMIN: CPT | Performed by: FAMILY MEDICINE

## 2019-10-21 RX ORDER — LOSARTAN POTASSIUM 50 MG/1
50 TABLET ORAL DAILY
Qty: 90 TABLET | Refills: 2 | Status: SHIPPED | OUTPATIENT
Start: 2019-10-21 | End: 2020-07-08

## 2019-10-21 RX ORDER — VENLAFAXINE HYDROCHLORIDE 75 MG/1
75 CAPSULE, EXTENDED RELEASE ORAL DAILY
Qty: 90 CAPSULE | Refills: 0 | Status: SHIPPED | OUTPATIENT
Start: 2019-10-21 | End: 2020-01-17

## 2019-10-21 RX ORDER — OMEPRAZOLE 40 MG/1
40 CAPSULE, DELAYED RELEASE ORAL DAILY
Qty: 90 CAPSULE | Refills: 3 | Status: SHIPPED | OUTPATIENT
Start: 2019-10-21 | End: 2020-02-10

## 2019-10-21 NOTE — PROGRESS NOTES
Patient ID: Wayne Macias is a 45year old female. HPI  Patient presents with:  Medication Follow-Up  Other: Bloating and gas from keto diet     Last seen by me on 12/7/18. Recently started a keto diet and has been experiencing bloating and gas.  She AGRATIO 1.2 12/05/2015     10/16/2019    K 4.0 10/16/2019     10/16/2019    CO2 30.0 10/16/2019       Lab Results   Component Value Date     (H) 10/16/2019    BUN 17 10/16/2019    CREATSERUM 0.68 10/16/2019    BUNCREA 25.0 (H) 10/16/2 Negative for chills, diaphoresis and fever. HENT: Negative for voice change. Respiratory: Negative for chest tightness and shortness of breath. Cardiovascular: Negative for chest pain.    Gastrointestinal: Positive for abdominal distention and abdom TOTAL) BY MOUTH NIGHTLY.  ., Disp: 90 tablet, Rfl: 1  TRUEPLUS PEN NEEDLES 32G X 4 MM Does not apply Misc, USE TO INJECT INSULIN THREE TIMES A DAY OR AS DIRECTED, Disp: 100 each, Rfl: 2  Insulin Lispro (ADMELOG SOLOSTAR) 100 UNIT/ML Subcutaneous Solution Pe and EOM are normal.   Neck: Normal range of motion. No thyromegaly present. Cardiovascular: Normal rate, regular rhythm and normal heart sounds. Pulmonary/Chest: Effort normal and breath sounds normal. No respiratory distress.    Abdominal: Normal appe daily.  She has been on ranitidine without much help. Lets try omeprazole and see how she does. I think some of this has to do with the stress and worry but also perhaps not taking care of her diabetes as she was missing doses of insulin.   Abdominal bloa

## 2019-10-29 RX ORDER — ISOPRPOPYL ALCOHOL 70 ML/100ML
SWAB TOPICAL
Qty: 100 EACH | Refills: 1 | Status: SHIPPED | OUTPATIENT
Start: 2019-10-29 | End: 2019-11-27

## 2019-10-30 RX ORDER — PEN NEEDLE, DIABETIC 32GX 5/32"
NEEDLE, DISPOSABLE MISCELLANEOUS
Qty: 100 EACH | Refills: 2 | Status: SHIPPED | OUTPATIENT
Start: 2019-10-30 | End: 2020-01-20

## 2019-11-14 RX ORDER — INSULIN GLARGINE 100 [IU]/ML
80 INJECTION, SOLUTION SUBCUTANEOUS DAILY
Qty: 30 ML | Refills: 0 | Status: SHIPPED | OUTPATIENT
Start: 2019-11-14 | End: 2019-12-31

## 2019-11-27 RX ORDER — ISOPRPOPYL ALCOHOL 70 ML/100ML
SWAB TOPICAL
Qty: 100 EACH | Refills: 2 | Status: SHIPPED | OUTPATIENT
Start: 2019-11-27 | End: 2020-05-14

## 2019-12-04 RX ORDER — LANCETS
EACH MISCELLANEOUS
Qty: 100 EACH | Refills: 0 | Status: SHIPPED | OUTPATIENT
Start: 2019-12-04 | End: 2020-02-10

## 2019-12-31 RX ORDER — INSULIN GLARGINE 100 [IU]/ML
80 INJECTION, SOLUTION SUBCUTANEOUS DAILY
Qty: 30 ML | Refills: 0 | Status: SHIPPED | OUTPATIENT
Start: 2019-12-31 | End: 2020-02-17

## 2020-01-16 DIAGNOSIS — F32.A ANXIETY AND DEPRESSION: ICD-10-CM

## 2020-01-16 DIAGNOSIS — F41.9 ANXIETY AND DEPRESSION: ICD-10-CM

## 2020-01-17 RX ORDER — VENLAFAXINE HYDROCHLORIDE 75 MG/1
CAPSULE, EXTENDED RELEASE ORAL
Qty: 90 CAPSULE | Refills: 0 | Status: SHIPPED | OUTPATIENT
Start: 2020-01-17 | End: 2020-06-11

## 2020-01-20 RX ORDER — PEN NEEDLE, DIABETIC 32GX 5/32"
NEEDLE, DISPOSABLE MISCELLANEOUS
Qty: 100 EACH | Refills: 2 | Status: SHIPPED | OUTPATIENT
Start: 2020-01-20 | End: 2020-06-16

## 2020-01-22 ENCOUNTER — OFFICE VISIT (OUTPATIENT)
Dept: ENDOCRINOLOGY CLINIC | Facility: CLINIC | Age: 45
End: 2020-01-22
Payer: MEDICAID

## 2020-01-22 VITALS
BODY MASS INDEX: 40 KG/M2 | SYSTOLIC BLOOD PRESSURE: 128 MMHG | WEIGHT: 214 LBS | DIASTOLIC BLOOD PRESSURE: 82 MMHG | HEART RATE: 93 BPM

## 2020-01-22 DIAGNOSIS — E11.8 DIABETES MELLITUS TYPE 2, UNCONTROLLED, WITH COMPLICATIONS (HCC): Primary | ICD-10-CM

## 2020-01-22 DIAGNOSIS — E11.65 DIABETES MELLITUS TYPE 2, UNCONTROLLED, WITH COMPLICATIONS (HCC): Primary | ICD-10-CM

## 2020-01-22 LAB
CARTRIDGE LOT#: ABNORMAL NUMERIC
GLUCOSE BLOOD: 112
HEMOGLOBIN A1C: 7.2 % (ref 4.3–5.6)
TEST STRIP LOT #: NORMAL NUMERIC

## 2020-01-22 PROCEDURE — 99213 OFFICE O/P EST LOW 20 MIN: CPT | Performed by: INTERNAL MEDICINE

## 2020-01-22 PROCEDURE — 82962 GLUCOSE BLOOD TEST: CPT | Performed by: INTERNAL MEDICINE

## 2020-01-22 PROCEDURE — 83036 HEMOGLOBIN GLYCOSYLATED A1C: CPT | Performed by: INTERNAL MEDICINE

## 2020-01-22 PROCEDURE — 36416 COLLJ CAPILLARY BLOOD SPEC: CPT | Performed by: INTERNAL MEDICINE

## 2020-01-22 RX ORDER — BLOOD SUGAR DIAGNOSTIC
STRIP MISCELLANEOUS
Qty: 200 STRIP | Refills: 2 | Status: SHIPPED | OUTPATIENT
Start: 2020-01-22 | End: 2020-06-16

## 2020-01-22 NOTE — PROGRESS NOTES
Name: Seven Boland  Date: 1/22/2020    Referring Physician: No ref. provider found    HISTORY OF PRESENT ILLNESS   Seven Boland is a 45year old female who presents for diabetes mellitus, diagnosed over 20 years ago.       Prior HbA, C or glycohemoglobin Disp: 100 each, Rfl: 0  •  ULTRA-CARE ALCOHOL PREP PADS 70 % Does not apply Pads, USE AS DIRECTED TO CLEAN AREA THREE TIMES A DAY, Disp: 100 each, Rfl: 2  •  losartan Potassium 50 MG Oral Tab, Take 1 tablet (50 mg total) by mouth daily. , Disp: 90 tablet, R 11  •  Insulin Pen Needle (NOVOFINE) 32G X 6 MM Does not apply Misc, use with insulin pen as directed, Disp: , Rfl:      Allergies:   No Known Allergies    Social History:   Social History    Socioeconomic History      Marital status: Single      Spouse na S4  Gastrointestinal:  normal bowel sounds and no palpable masses in abdomen, organomegaly or tenderness   Musculoskeletal:  normal muscle strength and tone  Skin:  normal moisture and skin texture  Hair & Nails:  normal scalp hair     Hematologic:  no exc

## 2020-01-30 ENCOUNTER — LAB ENCOUNTER (OUTPATIENT)
Dept: LAB | Age: 45
End: 2020-01-30
Attending: FAMILY MEDICINE
Payer: MEDICAID

## 2020-01-30 DIAGNOSIS — E11.65 UNCONTROLLED TYPE 2 DIABETES MELLITUS WITH HYPERGLYCEMIA (HCC): ICD-10-CM

## 2020-01-30 DIAGNOSIS — I10 ESSENTIAL HYPERTENSION: ICD-10-CM

## 2020-01-30 DIAGNOSIS — E78.2 MIXED HYPERLIPIDEMIA: ICD-10-CM

## 2020-01-30 LAB
ALT SERPL-CCNC: 35 U/L (ref 13–56)
ANION GAP SERPL CALC-SCNC: 3 MMOL/L (ref 0–18)
AST SERPL-CCNC: 26 U/L (ref 15–37)
BASOPHILS # BLD AUTO: 0.08 X10(3) UL (ref 0–0.2)
BASOPHILS NFR BLD AUTO: 0.6 %
BUN BLD-MCNC: 15 MG/DL (ref 7–18)
BUN/CREAT SERPL: 23.8 (ref 10–20)
CALCIUM BLD-MCNC: 9.2 MG/DL (ref 8.5–10.1)
CHLORIDE SERPL-SCNC: 107 MMOL/L (ref 98–112)
CHOLEST SMN-MCNC: 208 MG/DL (ref ?–200)
CO2 SERPL-SCNC: 32 MMOL/L (ref 21–32)
CREAT BLD-MCNC: 0.63 MG/DL (ref 0.55–1.02)
DEPRECATED RDW RBC AUTO: 40.5 FL (ref 35.1–46.3)
EOSINOPHIL # BLD AUTO: 0.18 X10(3) UL (ref 0–0.7)
EOSINOPHIL NFR BLD AUTO: 1.4 %
ERYTHROCYTE [DISTWIDTH] IN BLOOD BY AUTOMATED COUNT: 12 % (ref 11–15)
EST. AVERAGE GLUCOSE BLD GHB EST-MCNC: 163 MG/DL (ref 68–126)
GLUCOSE BLD-MCNC: 113 MG/DL (ref 70–99)
HBA1C MFR BLD HPLC: 7.3 % (ref ?–5.7)
HCT VFR BLD AUTO: 43.8 % (ref 35–48)
HDLC SERPL-MCNC: 47 MG/DL (ref 40–59)
HGB BLD-MCNC: 14 G/DL (ref 12–16)
IMM GRANULOCYTES # BLD AUTO: 0.04 X10(3) UL (ref 0–1)
IMM GRANULOCYTES NFR BLD: 0.3 %
LDLC SERPL CALC-MCNC: 136 MG/DL (ref ?–100)
LYMPHOCYTES # BLD AUTO: 4.57 X10(3) UL (ref 1–4)
LYMPHOCYTES NFR BLD AUTO: 35.1 %
MCH RBC QN AUTO: 29.4 PG (ref 26–34)
MCHC RBC AUTO-ENTMCNC: 32 G/DL (ref 31–37)
MCV RBC AUTO: 91.8 FL (ref 80–100)
MONOCYTES # BLD AUTO: 0.5 X10(3) UL (ref 0.1–1)
MONOCYTES NFR BLD AUTO: 3.8 %
NEUTROPHILS # BLD AUTO: 7.65 X10 (3) UL (ref 1.5–7.7)
NEUTROPHILS # BLD AUTO: 7.65 X10(3) UL (ref 1.5–7.7)
NEUTROPHILS NFR BLD AUTO: 58.8 %
NONHDLC SERPL-MCNC: 161 MG/DL (ref ?–130)
OSMOLALITY SERPL CALC.SUM OF ELEC: 296 MOSM/KG (ref 275–295)
PATIENT FASTING Y/N/NP: YES
PATIENT FASTING Y/N/NP: YES
PLATELET # BLD AUTO: 356 10(3)UL (ref 150–450)
POTASSIUM SERPL-SCNC: 4 MMOL/L (ref 3.5–5.1)
RBC # BLD AUTO: 4.77 X10(6)UL (ref 3.8–5.3)
SODIUM SERPL-SCNC: 142 MMOL/L (ref 136–145)
TRIGL SERPL-MCNC: 125 MG/DL (ref 30–149)
VLDLC SERPL CALC-MCNC: 25 MG/DL (ref 0–30)
WBC # BLD AUTO: 13 X10(3) UL (ref 4–11)

## 2020-01-30 PROCEDURE — 80061 LIPID PANEL: CPT

## 2020-01-30 PROCEDURE — 84460 ALANINE AMINO (ALT) (SGPT): CPT

## 2020-01-30 PROCEDURE — 80048 BASIC METABOLIC PNL TOTAL CA: CPT

## 2020-01-30 PROCEDURE — 36415 COLL VENOUS BLD VENIPUNCTURE: CPT

## 2020-01-30 PROCEDURE — 83036 HEMOGLOBIN GLYCOSYLATED A1C: CPT

## 2020-01-30 PROCEDURE — 85025 COMPLETE CBC W/AUTO DIFF WBC: CPT

## 2020-01-30 PROCEDURE — 84450 TRANSFERASE (AST) (SGOT): CPT

## 2020-02-07 RX ORDER — INSULIN LISPRO 100 [IU]/ML
20 INJECTION, SOLUTION INTRAVENOUS; SUBCUTANEOUS
Qty: 18 PEN | Refills: 0 | Status: SHIPPED | OUTPATIENT
Start: 2020-02-07 | End: 2020-03-13

## 2020-02-07 NOTE — TELEPHONE ENCOUNTER
•  Insulin Lispro (ADMELOG SOLOSTAR) 100 UNIT/ML Subcutaneous Solution Pen-injector, Inject 30 Units into the skin 3 (three) times daily with meals.  (Patient taking differently: Inject 20 Units into the skin 3 (three) times daily with meals.  ), Disp: 45 m

## 2020-02-07 NOTE — TELEPHONE ENCOUNTER
LOV: 01/22/20  LR: 08/07/19  Future Appointments   Date Time Provider Annita Soriano   4/29/2020  9:15 AM Maryam Bonilla MD [facility]     Pended 90 days for review and approval

## 2020-02-10 ENCOUNTER — OFFICE VISIT (OUTPATIENT)
Dept: FAMILY MEDICINE CLINIC | Facility: CLINIC | Age: 45
End: 2020-02-10
Payer: MEDICAID

## 2020-02-10 VITALS
DIASTOLIC BLOOD PRESSURE: 76 MMHG | HEART RATE: 99 BPM | HEIGHT: 61 IN | WEIGHT: 214.38 LBS | TEMPERATURE: 99 F | SYSTOLIC BLOOD PRESSURE: 119 MMHG | BODY MASS INDEX: 40.47 KG/M2

## 2020-02-10 DIAGNOSIS — E11.65 UNCONTROLLED TYPE 2 DIABETES MELLITUS WITH HYPERGLYCEMIA (HCC): Primary | ICD-10-CM

## 2020-02-10 DIAGNOSIS — F32.A ANXIETY AND DEPRESSION: ICD-10-CM

## 2020-02-10 DIAGNOSIS — M75.41 ROTATOR CUFF IMPINGEMENT SYNDROME OF RIGHT SHOULDER: ICD-10-CM

## 2020-02-10 DIAGNOSIS — G89.29 CHRONIC RIGHT SHOULDER PAIN: ICD-10-CM

## 2020-02-10 DIAGNOSIS — E11.65 UNCONTROLLED DIABETES MELLITUS WITH MICROALBUMINURIA, WITH LONG-TERM CURRENT USE OF INSULIN (HCC): ICD-10-CM

## 2020-02-10 DIAGNOSIS — F41.9 ANXIETY AND DEPRESSION: ICD-10-CM

## 2020-02-10 DIAGNOSIS — E11.29 UNCONTROLLED DIABETES MELLITUS WITH MICROALBUMINURIA, WITH LONG-TERM CURRENT USE OF INSULIN (HCC): ICD-10-CM

## 2020-02-10 DIAGNOSIS — M25.511 CHRONIC RIGHT SHOULDER PAIN: ICD-10-CM

## 2020-02-10 DIAGNOSIS — R80.9 UNCONTROLLED DIABETES MELLITUS WITH MICROALBUMINURIA, WITH LONG-TERM CURRENT USE OF INSULIN (HCC): ICD-10-CM

## 2020-02-10 DIAGNOSIS — Z79.4 UNCONTROLLED DIABETES MELLITUS WITH MICROALBUMINURIA, WITH LONG-TERM CURRENT USE OF INSULIN (HCC): ICD-10-CM

## 2020-02-10 DIAGNOSIS — E78.5 HYPERLIPIDEMIA, UNSPECIFIED HYPERLIPIDEMIA TYPE: ICD-10-CM

## 2020-02-10 DIAGNOSIS — E78.2 MIXED HYPERLIPIDEMIA: ICD-10-CM

## 2020-02-10 PROCEDURE — 99214 OFFICE O/P EST MOD 30 MIN: CPT | Performed by: FAMILY MEDICINE

## 2020-02-10 RX ORDER — NABUMETONE 750 MG/1
750 TABLET, FILM COATED ORAL 2 TIMES DAILY
Qty: 60 TABLET | Refills: 0 | Status: SHIPPED | OUTPATIENT
Start: 2020-02-10 | End: 2020-07-14

## 2020-02-10 RX ORDER — LANCETS
EACH MISCELLANEOUS
Qty: 200 EACH | Refills: 0 | Status: SHIPPED | OUTPATIENT
Start: 2020-02-10 | End: 2020-03-30

## 2020-02-10 RX ORDER — ATORVASTATIN CALCIUM 20 MG/1
20 TABLET, FILM COATED ORAL DAILY
Qty: 90 TABLET | Refills: 1 | COMMUNITY
Start: 2020-02-10 | End: 2020-02-17

## 2020-02-10 NOTE — PROGRESS NOTES
Patient ID: Francisco Magallanes is a 45year old female. HPI  Patient presents with:  High Cholesterol: follow up,  Fatigue: x 1 month  Dizziness: x 1 month    Last seen by me on 10/21/19. She no longer takes omeprazole as her sx resolved after 1 month.  She MOPERCENT 3.8 01/30/2020    EOPERCENT 1.4 01/30/2020    BAPERCENT 0.6 01/30/2020    NE 7.65 01/30/2020    LYMABS 4.57 (H) 01/30/2020    MOABSO 0.50 01/30/2020    EOABSO 0.18 01/30/2020    BAABSO 0.08 01/30/2020       Lab Results   Component Value Date    G 10/16/2019 1.950     TSH (S) (uIU/mL)   Date Value   12/05/2015 1.46       Wt Readings from Last 6 Encounters:  02/10/20 : 214 lb 6.4 oz (97.3 kg)  01/22/20 : 214 lb (97.1 kg)  10/21/19 : 212 lb (96.2 kg)  10/16/19 : 210 lb (95.3 kg)  08/07/19 : 215 lb ( injection tendon sheath, ligament [place] Campos) (x2)   • RETINAL LASER PROCEDURE Left 7-2012    [facility]          Current Outpatient Medications   Medication Sig Dispense Refill   • Insulin Lispro, 1 Unit Dial, (ADMELOG SOLOSTAR) 100 UNIT/ML Sub not apply route daily. 1 kit 0   • Glucose Blood In Vitro Strip Check BS twice daily 100 strip 11   • Blood Glucose Monitoring Suppl (TIAGO CONTOUR NEXT EZ) w/Device Does not apply Kit 1 Device by Does not apply route daily.  1 kit 0   • BD PEN NEEDLE OHSSEIN 214 lb 6.4 oz (97.3 kg), not currently breastfeeding. ASSESSMENT/PLAN:     Diagnoses and all orders for this visit:        Uncontrolled type 2 diabetes mellitus with hyperglycemia ([facility] [address]  -     OPTOMETRY - INTERNAL  See eye doctor.   Continue seeing attest that this documentation has been prepared under the direction and in the presence of Manolo Torres DO.    Electronically Signed: [place] , 2/10/2020, 9:55 AM.    I, Manolo Torres DO,  personally performed the services described in this docum

## 2020-02-17 DIAGNOSIS — R80.9 UNCONTROLLED DIABETES MELLITUS WITH MICROALBUMINURIA, WITH LONG-TERM CURRENT USE OF INSULIN (HCC): ICD-10-CM

## 2020-02-17 DIAGNOSIS — E78.5 HYPERLIPIDEMIA, UNSPECIFIED HYPERLIPIDEMIA TYPE: ICD-10-CM

## 2020-02-17 DIAGNOSIS — E11.65 UNCONTROLLED DIABETES MELLITUS WITH MICROALBUMINURIA, WITH LONG-TERM CURRENT USE OF INSULIN (HCC): ICD-10-CM

## 2020-02-17 DIAGNOSIS — E11.29 UNCONTROLLED DIABETES MELLITUS WITH MICROALBUMINURIA, WITH LONG-TERM CURRENT USE OF INSULIN (HCC): ICD-10-CM

## 2020-02-17 DIAGNOSIS — E78.2 MIXED HYPERLIPIDEMIA: ICD-10-CM

## 2020-02-17 DIAGNOSIS — Z79.4 UNCONTROLLED DIABETES MELLITUS WITH MICROALBUMINURIA, WITH LONG-TERM CURRENT USE OF INSULIN (HCC): ICD-10-CM

## 2020-02-17 RX ORDER — ATORVASTATIN CALCIUM 20 MG/1
TABLET, FILM COATED ORAL
Qty: 90 TABLET | Refills: 1 | Status: SHIPPED | OUTPATIENT
Start: 2020-02-17 | End: 2020-08-06

## 2020-02-17 RX ORDER — INSULIN GLARGINE 100 [IU]/ML
80 INJECTION, SOLUTION SUBCUTANEOUS DAILY
Qty: 30 ML | Refills: 0 | Status: SHIPPED | OUTPATIENT
Start: 2020-02-17 | End: 2020-03-13

## 2020-03-10 RX ORDER — DULAGLUTIDE 1.5 MG/.5ML
INJECTION, SOLUTION SUBCUTANEOUS
Qty: 2 ML | Refills: 0 | Status: SHIPPED | OUTPATIENT
Start: 2020-03-10 | End: 2020-05-04

## 2020-03-13 RX ORDER — INSULIN GLARGINE 100 [IU]/ML
80 INJECTION, SOLUTION SUBCUTANEOUS DAILY
Qty: 30 ML | Refills: 0 | Status: SHIPPED | OUTPATIENT
Start: 2020-03-13 | End: 2020-04-14

## 2020-03-13 RX ORDER — INSULIN LISPRO 100 U/ML
INJECTION, SOLUTION SUBCUTANEOUS
Qty: 54 ML | Refills: 0 | Status: SHIPPED | OUTPATIENT
Start: 2020-03-13 | End: 2020-06-16

## 2020-03-30 RX ORDER — LANCETS
EACH MISCELLANEOUS
Qty: 200 EACH | Refills: 1 | Status: SHIPPED | OUTPATIENT
Start: 2020-03-30 | End: 2020-08-31

## 2020-04-14 RX ORDER — INSULIN GLARGINE 100 [IU]/ML
80 INJECTION, SOLUTION SUBCUTANEOUS DAILY
Qty: 30 ML | Refills: 0 | Status: SHIPPED | OUTPATIENT
Start: 2020-04-14 | End: 2020-06-12

## 2020-04-29 ENCOUNTER — VIRTUAL PHONE E/M (OUTPATIENT)
Dept: ENDOCRINOLOGY CLINIC | Facility: CLINIC | Age: 45
End: 2020-04-29
Payer: MEDICAID

## 2020-04-29 DIAGNOSIS — E11.65 UNCONTROLLED TYPE 2 DIABETES MELLITUS WITH HYPERGLYCEMIA (HCC): Primary | ICD-10-CM

## 2020-04-29 PROCEDURE — 99213 OFFICE O/P EST LOW 20 MIN: CPT | Performed by: INTERNAL MEDICINE

## 2020-04-29 NOTE — PROGRESS NOTES
Virtual Telephone Check-In    Joel Liao verbally consents to a Virtual/Telephone Check-In visit on 04/29/20. Patient understands and accepts financial responsibility for any deductible, co-insurance and/or co-pays associated with this service.     Du SOLOSTAR 100 UNIT/ML Subcutaneous Solution Pen-injector, INJECT 20 UNITS INTO THE SKIN 3 (THREE) TIMES DAILY WITH MEALS., Disp: 54 mL, Rfl: 0  •  TRULICITY 1.5 MF/3.8NN Subcutaneous Solution Pen-injector, INJECT 1.5 MG INTO THE SKIN ONCE A WEEK., Disp: 2 m Rfl: 0  •  Glucose Blood In Vitro Strip, Check BS twice daily, Disp: 100 strip, Rfl: 11  •  Blood Glucose Monitoring Suppl (TIAGO CONTOUR NEXT EZ) w/Device Does not apply Kit, 1 Device by Does not apply route daily. , Disp: 1 kit, Rfl: 0  •  BD PEN NEEDLE N non-labored. no increased work of breathing. Psychiatric:  oriented to time, self, and place    ASSESSMENT/PLAN:      1.  Diabetes Mellitus Type 2, Uncontrolled  -Uncontrolled, hgA1c 7.2%   -Reviewed BG levels on glucometer and sugars remain at goal   -D

## 2020-05-04 RX ORDER — DULAGLUTIDE 1.5 MG/.5ML
INJECTION, SOLUTION SUBCUTANEOUS
Qty: 2 ML | Refills: 2 | Status: SHIPPED | OUTPATIENT
Start: 2020-05-04 | End: 2020-07-13

## 2020-05-14 RX ORDER — ISOPRPOPYL ALCOHOL 70 ML/100ML
SWAB TOPICAL
Qty: 100 EACH | Refills: 2 | Status: SHIPPED | OUTPATIENT
Start: 2020-05-14 | End: 2020-08-06

## 2020-06-10 ENCOUNTER — TELEPHONE (OUTPATIENT)
Dept: FAMILY MEDICINE CLINIC | Facility: CLINIC | Age: 45
End: 2020-06-10

## 2020-06-11 NOTE — PROGRESS NOTES
TELEPHONE VISIT PROGRESS NOTE  Todays date: 6/11/2020 1:10 PM      Due to the COVID-19 emergency implementation plan, this patient's incoming call was converted to a telephone visit as agreed upon with the patient.     Virtual/Telephone Check-In    [employer] July. She is awaiting a call from Dr. Sanford Fischer to instruct her to get the labs done before her next visit. Her right shoulder pain has not improved. Her shoulder becomes painful after a while when she is working.  She states she does not have the pain curre will continue to 75 mg daily. No complications with the medication. Chronic right shoulder pain  -     XR SHOULDER, COMPLETE (MIN 2 VIEWS), RIGHT (CPT=73030); Future  X-ray of the right shoulder as continues to hurt.   Physical therapy ordered for the rig CLEAN AREA THREE TIMES A  each 2   • TRULICITY 1.5 MI/8.7RU Subcutaneous Solution Pen-injector INJECT 1.5 MG INTO THE SKIN ONCE A WEEK. 2 mL 2   • BASAGLAR KWIKPEN 100 UNIT/ML Subcutaneous Solution Pen-injector INJECT 80 UNITS INTO THE SKIN DAILY.  3 twice daily 100 strip 11   • Blood Glucose Monitoring Suppl (TIAGO CONTOUR NEXT EZ) w/Device Does not apply Kit 1 Device by Does not apply route daily.  1 kit 0   • BD PEN NEEDLE HOSSEIN U/F 32G X 4 MM Does not apply Misc USE UTD  11   • Insulin Pen Needle (NO

## 2020-06-12 RX ORDER — INSULIN GLARGINE 100 [IU]/ML
80 INJECTION, SOLUTION SUBCUTANEOUS DAILY
Qty: 30 ML | Refills: 5 | Status: SHIPPED | OUTPATIENT
Start: 2020-06-12 | End: 2020-12-07

## 2020-06-16 ENCOUNTER — HOSPITAL ENCOUNTER (OUTPATIENT)
Dept: GENERAL RADIOLOGY | Age: 45
Discharge: HOME | End: 2020-06-16
Attending: FAMILY MEDICINE
Payer: MEDICAID

## 2020-06-16 DIAGNOSIS — M75.41 ROTATOR CUFF IMPINGEMENT SYNDROME OF RIGHT SHOULDER: ICD-10-CM

## 2020-06-16 DIAGNOSIS — M25.511 CHRONIC RIGHT SHOULDER PAIN: ICD-10-CM

## 2020-06-16 DIAGNOSIS — G89.29 CHRONIC RIGHT SHOULDER PAIN: ICD-10-CM

## 2020-06-16 PROCEDURE — 73030 X-RAY EXAM OF SHOULDER: CPT | Performed by: FAMILY MEDICINE

## 2020-06-16 RX ORDER — BLOOD SUGAR DIAGNOSTIC
STRIP MISCELLANEOUS
Qty: 200 STRIP | Refills: 2 | Status: SHIPPED | OUTPATIENT
Start: 2020-06-16 | End: 2020-11-18

## 2020-06-16 RX ORDER — INSULIN LISPRO 100 U/ML
INJECTION, SOLUTION SUBCUTANEOUS
Qty: 54 ML | Refills: 0 | Status: SHIPPED | OUTPATIENT
Start: 2020-06-16 | End: 2020-08-31

## 2020-06-16 RX ORDER — PEN NEEDLE, DIABETIC 32GX 5/32"
NEEDLE, DISPOSABLE MISCELLANEOUS
Qty: 100 EACH | Refills: 2 | Status: SHIPPED | OUTPATIENT
Start: 2020-06-16 | End: 2020-09-11

## 2020-07-06 DIAGNOSIS — I10 ESSENTIAL HYPERTENSION: ICD-10-CM

## 2020-07-08 RX ORDER — LOSARTAN POTASSIUM 50 MG/1
50 TABLET ORAL DAILY
Qty: 90 TABLET | Refills: 2 | Status: SHIPPED | OUTPATIENT
Start: 2020-07-08 | End: 2021-06-15

## 2020-07-13 RX ORDER — DULAGLUTIDE 1.5 MG/.5ML
INJECTION, SOLUTION SUBCUTANEOUS
Qty: 2 ML | Refills: 2 | Status: SHIPPED | OUTPATIENT
Start: 2020-07-13 | End: 2020-09-30

## 2020-07-14 ENCOUNTER — OFFICE VISIT (OUTPATIENT)
Dept: FAMILY MEDICINE CLINIC | Facility: CLINIC | Age: 45
End: 2020-07-14
Payer: MEDICAID

## 2020-07-14 VITALS
HEIGHT: 61 IN | WEIGHT: 218.13 LBS | HEART RATE: 101 BPM | SYSTOLIC BLOOD PRESSURE: 118 MMHG | DIASTOLIC BLOOD PRESSURE: 80 MMHG | TEMPERATURE: 98 F | BODY MASS INDEX: 41.18 KG/M2

## 2020-07-14 DIAGNOSIS — E11.65 UNCONTROLLED TYPE 2 DIABETES MELLITUS WITH HYPERGLYCEMIA (HCC): ICD-10-CM

## 2020-07-14 DIAGNOSIS — M79.641 BILATERAL HAND PAIN: Primary | ICD-10-CM

## 2020-07-14 DIAGNOSIS — M79.642 BILATERAL HAND PAIN: Primary | ICD-10-CM

## 2020-07-14 DIAGNOSIS — M65.349 TRIGGER RING FINGER, UNSPECIFIED LATERALITY: ICD-10-CM

## 2020-07-14 DIAGNOSIS — M75.21 BICEPS TENDINITIS OF RIGHT SHOULDER: ICD-10-CM

## 2020-07-14 PROCEDURE — 99214 OFFICE O/P EST MOD 30 MIN: CPT | Performed by: FAMILY MEDICINE

## 2020-07-14 RX ORDER — MELOXICAM 15 MG/1
15 TABLET ORAL DAILY
Qty: 90 TABLET | Refills: 0 | Status: SHIPPED | OUTPATIENT
Start: 2020-07-14 | End: 2020-08-15

## 2020-07-14 NOTE — PROGRESS NOTES
Patient ID: Miah Larson is a 39year old female. HPI  Patient presents with:  Shoulder Pain: bilateral shoulder pain   Hand Pain: bilateral hand pain    Last seen by me for a virtual telephone visit on 6/11/20.     Pt continues to have right shoulder : 128/84  08/07/19 : 133/67        Review of Systems   Constitutional: Negative for chills and fever. HENT: Negative for voice change. Respiratory: Negative for chest tightness and shortness of breath. Cardiovascular: Negative for chest pain.    Gas TRUEPLUS PEN NEEDLES 32G X 4 MM Does not apply Misc USE TO INJECT INSULIN THREE TIMES A DAY OR AS DIRECTED 100 each 2   • Glucose Blood (ONETOUCH VERIO) In Vitro Strip CHECK BLOOD SUGAR 4 TIMES PER  strip 2   • BASAGLAR KWIKPEN 100 UNIT/ML Subcutane Physical Exam   Constitutional: Patient is oriented to person, place, and time. Patient appears well-developed and well-nourished. No distress. Head: Normocephalic.    Eyes: Conjunctivae and EOM are normal.   Neurological: Patient is alert and oriented INTERNAL  -     Meloxicam 15 MG Oral Tab; Take 1 tablet (15 mg total) by mouth daily. For pain and inflammation. Take with food. Biceps tendinitis of right shoulder  -     OCCUPATIONAL THERAPY - INTERNAL  -     Meloxicam 15 MG Oral Tab;  Take 1 tablet ( instructions (if applicable) and agree that the record reflects my personal performance and is accurate and complete.   Ashok Osborne DO, 7/14/2020, 4:31 PM

## 2020-07-15 ENCOUNTER — APPOINTMENT (OUTPATIENT)
Dept: LAB | Age: 45
End: 2020-07-15
Attending: INTERNAL MEDICINE
Payer: MEDICAID

## 2020-07-15 DIAGNOSIS — E11.65 UNCONTROLLED TYPE 2 DIABETES MELLITUS WITH HYPERGLYCEMIA (HCC): ICD-10-CM

## 2020-07-15 DIAGNOSIS — E78.2 MIXED HYPERLIPIDEMIA: ICD-10-CM

## 2020-07-15 LAB
ALBUMIN SERPL-MCNC: 3.3 G/DL (ref 3.4–5)
ALBUMIN/GLOB SERPL: 0.9 {RATIO} (ref 1–2)
ALP LIVER SERPL-CCNC: 78 U/L (ref 37–98)
ALT SERPL-CCNC: 30 U/L (ref 13–56)
ANION GAP SERPL CALC-SCNC: 3 MMOL/L (ref 0–18)
AST SERPL-CCNC: 15 U/L (ref 15–37)
BILIRUB SERPL-MCNC: 0.4 MG/DL (ref 0.1–2)
BUN BLD-MCNC: 18 MG/DL (ref 7–18)
BUN/CREAT SERPL: 26.9 (ref 10–20)
CALCIUM BLD-MCNC: 8.6 MG/DL (ref 8.5–10.1)
CHLORIDE SERPL-SCNC: 107 MMOL/L (ref 98–112)
CHOLEST SMN-MCNC: 130 MG/DL (ref ?–200)
CO2 SERPL-SCNC: 32 MMOL/L (ref 21–32)
CREAT BLD-MCNC: 0.67 MG/DL (ref 0.55–1.02)
CREAT UR-SCNC: 203 MG/DL
EST. AVERAGE GLUCOSE BLD GHB EST-MCNC: 160 MG/DL (ref 68–126)
GLOBULIN PLAS-MCNC: 3.7 G/DL (ref 2.8–4.4)
GLUCOSE BLD-MCNC: 128 MG/DL (ref 70–99)
HBA1C MFR BLD HPLC: 7.2 % (ref ?–5.7)
HDLC SERPL-MCNC: 46 MG/DL (ref 40–59)
LDLC SERPL CALC-MCNC: 61 MG/DL (ref ?–100)
M PROTEIN MFR SERPL ELPH: 7 G/DL (ref 6.4–8.2)
MICROALBUMIN UR-MCNC: 5.14 MG/DL
MICROALBUMIN/CREAT 24H UR-RTO: 25.3 UG/MG (ref ?–30)
NONHDLC SERPL-MCNC: 84 MG/DL (ref ?–130)
OSMOLALITY SERPL CALC.SUM OF ELEC: 298 MOSM/KG (ref 275–295)
PATIENT FASTING Y/N/NP: YES
PATIENT FASTING Y/N/NP: YES
POTASSIUM SERPL-SCNC: 4 MMOL/L (ref 3.5–5.1)
SODIUM SERPL-SCNC: 142 MMOL/L (ref 136–145)
TRIGL SERPL-MCNC: 113 MG/DL (ref 30–149)
VLDLC SERPL CALC-MCNC: 23 MG/DL (ref 0–30)

## 2020-07-15 PROCEDURE — 36415 COLL VENOUS BLD VENIPUNCTURE: CPT

## 2020-07-15 PROCEDURE — 83036 HEMOGLOBIN GLYCOSYLATED A1C: CPT

## 2020-07-15 PROCEDURE — 82043 UR ALBUMIN QUANTITATIVE: CPT

## 2020-07-15 PROCEDURE — 82570 ASSAY OF URINE CREATININE: CPT

## 2020-07-15 PROCEDURE — 80061 LIPID PANEL: CPT

## 2020-07-15 PROCEDURE — 80053 COMPREHEN METABOLIC PANEL: CPT

## 2020-07-17 ENCOUNTER — TELEPHONE (OUTPATIENT)
Dept: PHYSICAL THERAPY | Facility: HOSPITAL | Age: 45
End: 2020-07-17

## 2020-07-20 ENCOUNTER — OFFICE VISIT (OUTPATIENT)
Dept: OCCUPATIONAL MEDICINE | Facility: HOSPITAL | Age: 45
End: 2020-07-20
Attending: FAMILY MEDICINE
Payer: MEDICAID

## 2020-07-20 DIAGNOSIS — M79.642 BILATERAL HAND PAIN: ICD-10-CM

## 2020-07-20 DIAGNOSIS — M79.641 BILATERAL HAND PAIN: ICD-10-CM

## 2020-07-20 DIAGNOSIS — M75.21 BICEPS TENDINITIS OF RIGHT SHOULDER: ICD-10-CM

## 2020-07-20 DIAGNOSIS — M65.349 TRIGGER RING FINGER, UNSPECIFIED LATERALITY: ICD-10-CM

## 2020-07-20 PROCEDURE — 97166 OT EVAL MOD COMPLEX 45 MIN: CPT | Performed by: OCCUPATIONAL THERAPIST

## 2020-07-20 PROCEDURE — 97110 THERAPEUTIC EXERCISES: CPT | Performed by: OCCUPATIONAL THERAPIST

## 2020-07-20 NOTE — PROGRESS NOTES
OCCUPATIONAL THERAPY UPPER EXTREMITY EVALUATION:   Referring Physician: Dr. Christine Cat  Date of onset: 2 years  Diagnosis: Bilateral hand pain, trigger finger, Biceps tendonitis of right shoulder Date of Service: 07/20/20     PATIENT SUMMARY:   Lora Maldonado Jaquelyn Duverney is a middle age Uzbek speaking woman who came to therapy with multiple complaints. Main complaint is triggering in bilateral ring fingers. Patient is mother to three children, her youngest has ADHD and keeps her very busy.  She lives with her DIP 0/30 0/30 0/20 0/32   BONILLA 167 172 165 184     LEFT HAND AROM:   IF MF RF SF   MP 0/50 0/55 0/55 0/60   PIP 0/70 0/80 0/75 0/65   DIP 0/25 0/27 0/22 0/20   BONILLA 145 162 152 145               Strength (lbs) Right            Trial          1           2 Patient will demonstrate independence with don/doff trigger finger orthosis. Frequency / Duration: Patient will be seen for 2 x/week or a total of 10 visits over 60  day period.   Treatment will include: Manual Therapy, Soft tissue mobilization, AROM, NE

## 2020-07-24 ENCOUNTER — OFFICE VISIT (OUTPATIENT)
Dept: OCCUPATIONAL MEDICINE | Facility: HOSPITAL | Age: 45
End: 2020-07-24
Attending: FAMILY MEDICINE
Payer: MEDICAID

## 2020-07-24 DIAGNOSIS — M79.642 BILATERAL HAND PAIN: ICD-10-CM

## 2020-07-24 DIAGNOSIS — M75.21 BICEPS TENDINITIS OF RIGHT SHOULDER: ICD-10-CM

## 2020-07-24 DIAGNOSIS — M79.641 BILATERAL HAND PAIN: ICD-10-CM

## 2020-07-24 DIAGNOSIS — M65.349 TRIGGER RING FINGER, UNSPECIFIED LATERALITY: ICD-10-CM

## 2020-07-24 PROCEDURE — 97140 MANUAL THERAPY 1/> REGIONS: CPT | Performed by: OCCUPATIONAL THERAPIST

## 2020-07-24 PROCEDURE — 97110 THERAPEUTIC EXERCISES: CPT | Performed by: OCCUPATIONAL THERAPIST

## 2020-07-24 NOTE — PROGRESS NOTES
Dx:     Bilateral hand pain, trigger finger, Biceps tendonitis of right shoulder     Authorized # of Visits:  10        Next MD visit: none scheduled  Fall Risk: standard         Precautions: n/a           Medication Changes since last visit?: No  Subjec least 35 lbs for ease in opening jars. Patient will demonstrate independence with don/doff trigger finger orthosis. Plan: Continue with working toward pain reduction and strengthen.       Charges: MT2,TE    Total Timed Treatment: 40 min  Total Treatme

## 2020-07-27 ENCOUNTER — OFFICE VISIT (OUTPATIENT)
Dept: OCCUPATIONAL MEDICINE | Facility: HOSPITAL | Age: 45
End: 2020-07-27
Attending: FAMILY MEDICINE
Payer: MEDICAID

## 2020-07-27 DIAGNOSIS — M79.641 BILATERAL HAND PAIN: ICD-10-CM

## 2020-07-27 DIAGNOSIS — M65.349 TRIGGER RING FINGER, UNSPECIFIED LATERALITY: ICD-10-CM

## 2020-07-27 DIAGNOSIS — M75.21 BICEPS TENDINITIS OF RIGHT SHOULDER: ICD-10-CM

## 2020-07-27 DIAGNOSIS — M79.642 BILATERAL HAND PAIN: ICD-10-CM

## 2020-07-27 PROCEDURE — 97035 APP MDLTY 1+ULTRASOUND EA 15: CPT | Performed by: OCCUPATIONAL THERAPIST

## 2020-07-27 PROCEDURE — 97140 MANUAL THERAPY 1/> REGIONS: CPT | Performed by: OCCUPATIONAL THERAPIST

## 2020-07-27 NOTE — PROGRESS NOTES
Dx:     Bilateral hand pain, trigger finger, Biceps tendonitis of right shoulder     Authorized # of Visits:  10        Next MD visit: none scheduled  Fall Risk: standard         Precautions: Diabetes      Medication Changes since last visit?: No  Subjec applied kinesiotape. Goals:   Pt complaints of pain in right shoulder and bilateral hands will decrease at worst to 1/10. Pt will be independent and compliant with comprehensive HEP to maintain progress achieved in OT.   Patient will demonstrate incre

## 2020-08-05 DIAGNOSIS — E78.5 HYPERLIPIDEMIA, UNSPECIFIED HYPERLIPIDEMIA TYPE: ICD-10-CM

## 2020-08-05 DIAGNOSIS — R80.9 UNCONTROLLED DIABETES MELLITUS WITH MICROALBUMINURIA, WITH LONG-TERM CURRENT USE OF INSULIN (HCC): ICD-10-CM

## 2020-08-05 DIAGNOSIS — Z79.4 UNCONTROLLED DIABETES MELLITUS WITH MICROALBUMINURIA, WITH LONG-TERM CURRENT USE OF INSULIN (HCC): ICD-10-CM

## 2020-08-05 DIAGNOSIS — E11.29 UNCONTROLLED DIABETES MELLITUS WITH MICROALBUMINURIA, WITH LONG-TERM CURRENT USE OF INSULIN (HCC): ICD-10-CM

## 2020-08-05 DIAGNOSIS — E78.2 MIXED HYPERLIPIDEMIA: ICD-10-CM

## 2020-08-05 DIAGNOSIS — E11.65 UNCONTROLLED DIABETES MELLITUS WITH MICROALBUMINURIA, WITH LONG-TERM CURRENT USE OF INSULIN (HCC): ICD-10-CM

## 2020-08-06 ENCOUNTER — OFFICE VISIT (OUTPATIENT)
Dept: OCCUPATIONAL MEDICINE | Facility: HOSPITAL | Age: 45
End: 2020-08-06
Attending: FAMILY MEDICINE
Payer: MEDICAID

## 2020-08-06 DIAGNOSIS — M65.349 TRIGGER RING FINGER, UNSPECIFIED LATERALITY: ICD-10-CM

## 2020-08-06 DIAGNOSIS — M79.642 BILATERAL HAND PAIN: ICD-10-CM

## 2020-08-06 DIAGNOSIS — M79.641 BILATERAL HAND PAIN: ICD-10-CM

## 2020-08-06 DIAGNOSIS — M75.21 BICEPS TENDINITIS OF RIGHT SHOULDER: ICD-10-CM

## 2020-08-06 PROCEDURE — 97035 APP MDLTY 1+ULTRASOUND EA 15: CPT | Performed by: OCCUPATIONAL THERAPIST

## 2020-08-06 PROCEDURE — 97140 MANUAL THERAPY 1/> REGIONS: CPT | Performed by: OCCUPATIONAL THERAPIST

## 2020-08-06 RX ORDER — ATORVASTATIN CALCIUM 20 MG/1
TABLET, FILM COATED ORAL
Qty: 90 TABLET | Refills: 1 | Status: SHIPPED | OUTPATIENT
Start: 2020-08-06 | End: 2021-01-18

## 2020-08-06 RX ORDER — ISOPRPOPYL ALCOHOL 70 ML/100ML
SWAB TOPICAL
Qty: 100 EACH | Refills: 2 | Status: SHIPPED | OUTPATIENT
Start: 2020-08-06 | End: 2020-11-23

## 2020-08-06 NOTE — PROGRESS NOTES
Dx:     Bilateral hand pain, trigger finger, Biceps tendonitis of right shoulder     Authorized # of Visits:  10        Next MD visit: none scheduled  Fall Risk: standard         Precautions: Diabetes      Medication Changes since last visit?: No  Subjec kinesiotape after last session for three days however irritated skin and left rash. Bicep tendon very tendon and pecs, responded well to pec stretch. Goals:   Pt complaints of pain in right shoulder and bilateral hands will decrease at worst to 1/10.   Pt

## 2020-08-10 ENCOUNTER — OFFICE VISIT (OUTPATIENT)
Dept: OCCUPATIONAL MEDICINE | Facility: HOSPITAL | Age: 45
End: 2020-08-10
Attending: FAMILY MEDICINE
Payer: MEDICAID

## 2020-08-10 DIAGNOSIS — M75.21 BICEPS TENDINITIS OF RIGHT SHOULDER: ICD-10-CM

## 2020-08-10 DIAGNOSIS — M79.641 BILATERAL HAND PAIN: ICD-10-CM

## 2020-08-10 DIAGNOSIS — M79.642 BILATERAL HAND PAIN: ICD-10-CM

## 2020-08-10 DIAGNOSIS — M65.349 TRIGGER RING FINGER, UNSPECIFIED LATERALITY: ICD-10-CM

## 2020-08-10 PROCEDURE — 97035 APP MDLTY 1+ULTRASOUND EA 15: CPT | Performed by: OCCUPATIONAL THERAPIST

## 2020-08-10 PROCEDURE — 97140 MANUAL THERAPY 1/> REGIONS: CPT | Performed by: OCCUPATIONAL THERAPIST

## 2020-08-10 NOTE — PROGRESS NOTES
Dx:     Bilateral hand pain, trigger finger, Biceps tendonitis of right shoulder     Authorized # of Visits:  10        Next MD visit: none scheduled  Fall Risk: standard         Precautions: Diabetes      Medication Changes since last visit?: No  Subjec Modalities                    moist heat      4 min  4 min  4 min          Pulsed ultrsound 20%, 3.0 MHZ, 0.9 w/cm2      4 min over MF A1 pulleys  4 min over MF A1 pulleys  8 min over right bicep tendon 50%,                Assessment: Right

## 2020-08-12 ENCOUNTER — OFFICE VISIT (OUTPATIENT)
Dept: OCCUPATIONAL MEDICINE | Facility: HOSPITAL | Age: 45
End: 2020-08-12
Attending: FAMILY MEDICINE
Payer: MEDICAID

## 2020-08-12 DIAGNOSIS — M79.642 BILATERAL HAND PAIN: ICD-10-CM

## 2020-08-12 DIAGNOSIS — M79.641 BILATERAL HAND PAIN: ICD-10-CM

## 2020-08-12 DIAGNOSIS — M75.21 BICEPS TENDINITIS OF RIGHT SHOULDER: ICD-10-CM

## 2020-08-12 DIAGNOSIS — M65.349 TRIGGER RING FINGER, UNSPECIFIED LATERALITY: ICD-10-CM

## 2020-08-12 PROCEDURE — 97140 MANUAL THERAPY 1/> REGIONS: CPT | Performed by: OCCUPATIONAL THERAPIST

## 2020-08-12 PROCEDURE — 97110 THERAPEUTIC EXERCISES: CPT | Performed by: OCCUPATIONAL THERAPIST

## 2020-08-12 NOTE — PROGRESS NOTES
Dx:     Bilateral hand pain, trigger finger, Biceps tendonitis of right shoulder     Authorized # of Visits:  10        Next MD visit: none scheduled  Fall Risk: standard         Precautions: Diabetes      Medication Changes since last visit?: No  Subjec digit extension    x10 each  digit  x10  x10          Red web digit flexion stretch       10 sec x 5, bilaterl     digit tendon glides with foam between digits x 7 each  digit tendon glides with foam between digits x 7 each

## 2020-08-13 ENCOUNTER — TELEPHONE (OUTPATIENT)
Dept: ENDOCRINOLOGY CLINIC | Facility: CLINIC | Age: 45
End: 2020-08-13

## 2020-08-13 DIAGNOSIS — M75.21 BICEPS TENDINITIS OF RIGHT SHOULDER: ICD-10-CM

## 2020-08-13 DIAGNOSIS — M79.642 BILATERAL HAND PAIN: ICD-10-CM

## 2020-08-13 DIAGNOSIS — M79.641 BILATERAL HAND PAIN: ICD-10-CM

## 2020-08-13 DIAGNOSIS — M65.349 TRIGGER RING FINGER, UNSPECIFIED LATERALITY: ICD-10-CM

## 2020-08-13 NOTE — TELEPHONE ENCOUNTER
Per pt she missed phone call from office on 8/5. Pt made appt for f/u on 11/04  ( soonest appt available in [place]). Pt would like to know if okay to wait that long for follow up.  Please advise

## 2020-08-15 RX ORDER — MELOXICAM 15 MG/1
15 TABLET ORAL DAILY
Qty: 90 TABLET | Refills: 0 | Status: SHIPPED | OUTPATIENT
Start: 2020-08-15 | End: 2020-11-06

## 2020-08-17 ENCOUNTER — OFFICE VISIT (OUTPATIENT)
Dept: OCCUPATIONAL MEDICINE | Facility: HOSPITAL | Age: 45
End: 2020-08-17
Attending: FAMILY MEDICINE
Payer: MEDICAID

## 2020-08-17 DIAGNOSIS — M65.349 TRIGGER RING FINGER, UNSPECIFIED LATERALITY: ICD-10-CM

## 2020-08-17 DIAGNOSIS — M79.642 BILATERAL HAND PAIN: ICD-10-CM

## 2020-08-17 DIAGNOSIS — M79.641 BILATERAL HAND PAIN: ICD-10-CM

## 2020-08-17 DIAGNOSIS — M75.21 BICEPS TENDINITIS OF RIGHT SHOULDER: ICD-10-CM

## 2020-08-17 PROCEDURE — 97110 THERAPEUTIC EXERCISES: CPT | Performed by: OCCUPATIONAL THERAPIST

## 2020-08-17 PROCEDURE — 97140 MANUAL THERAPY 1/> REGIONS: CPT | Performed by: OCCUPATIONAL THERAPIST

## 2020-08-17 NOTE — PROGRESS NOTES
Dx:     Bilateral hand pain, trigger finger, Biceps tendonitis of right shoulder     Authorized # of Visits:  10        Next MD visit: none scheduled  Fall Risk: standard         Precautions: Diabetes      Medication Changes since last visit?: No  Subjec x10      Red web digit flexion stretch       10 sec x 5, bilaterl     digit tendon glides with foam between digits x 7 each  digit tendon glides with foam between digits x 7 each  digit tendon glides with foam between digits x 7 each

## 2020-08-26 ENCOUNTER — APPOINTMENT (OUTPATIENT)
Dept: OCCUPATIONAL MEDICINE | Facility: HOSPITAL | Age: 45
End: 2020-08-26
Attending: FAMILY MEDICINE
Payer: MEDICAID

## 2020-08-28 ENCOUNTER — OFFICE VISIT (OUTPATIENT)
Dept: OCCUPATIONAL MEDICINE | Facility: HOSPITAL | Age: 45
End: 2020-08-28
Attending: FAMILY MEDICINE
Payer: MEDICAID

## 2020-08-28 PROCEDURE — 97035 APP MDLTY 1+ULTRASOUND EA 15: CPT | Performed by: OCCUPATIONAL THERAPIST

## 2020-08-28 PROCEDURE — 97140 MANUAL THERAPY 1/> REGIONS: CPT | Performed by: OCCUPATIONAL THERAPIST

## 2020-08-28 PROCEDURE — 97110 THERAPEUTIC EXERCISES: CPT | Performed by: OCCUPATIONAL THERAPIST

## 2020-08-28 NOTE — PROGRESS NOTES
Dx:     Bilateral hand pain, trigger finger, Biceps tendonitis of right shoulder     Authorized # of Visits:  10        Next MD visit: none scheduled  Fall Risk: standard         Precautions: Diabetes      Medication Changes since last visit?: No  Subjec 20, lumbric x20  intrinsic hand strengthening muscle with rubber band abd/add x 20, lumbric    Pec stretch on 1/2 foam roll    20 sec x 5  digit abd/adduction x 10, 2 sets  Pec stretch on 1/2 roll foam roll 20 sec x 5  Bicep stretch at doorway 20 sec x 5 and bilateral hands will decrease at worst to 1/10. Adamaris Renteria .(made progress toward)  Pt will be independent and compliant with comprehensive HEP to maintain progress achieved in OT.   Patient will demonstrate increase in digit D2- D5 BONILLA to at least 200 degrees eac

## 2020-08-31 ENCOUNTER — APPOINTMENT (OUTPATIENT)
Dept: OCCUPATIONAL MEDICINE | Facility: HOSPITAL | Age: 45
End: 2020-08-31
Attending: FAMILY MEDICINE
Payer: MEDICAID

## 2020-08-31 PROCEDURE — 97140 MANUAL THERAPY 1/> REGIONS: CPT | Performed by: OCCUPATIONAL THERAPIST

## 2020-08-31 PROCEDURE — 97110 THERAPEUTIC EXERCISES: CPT | Performed by: OCCUPATIONAL THERAPIST

## 2020-08-31 PROCEDURE — 97035 APP MDLTY 1+ULTRASOUND EA 15: CPT | Performed by: OCCUPATIONAL THERAPIST

## 2020-08-31 RX ORDER — LANCETS 33 GAUGE
EACH MISCELLANEOUS
Qty: 200 EACH | Refills: 1 | Status: SHIPPED | OUTPATIENT
Start: 2020-08-31 | End: 2020-11-18

## 2020-08-31 RX ORDER — INSULIN LISPRO 100 U/ML
INJECTION, SOLUTION SUBCUTANEOUS
Qty: 54 ML | Refills: 0 | Status: SHIPPED | OUTPATIENT
Start: 2020-08-31 | End: 2020-11-02

## 2020-08-31 NOTE — PROGRESS NOTES
Dx:     Bilateral hand pain, trigger finger, Biceps tendonitis of right shoulder        Authorized # of Visits: 10       Next MD visit: none scheduled  Fall Risk: standard         Precautions: n/a           Medication Changes since last visit?: No  Dairl Cooler degrees each for ease in holding brush. ...(made progress toward)  Patient will demonstrate increase in bilateral  strength to at least 35 lbs for ease in opening jars. Patient will demonstrate independence with don/doff trigger finger orthosis. Pool Longoria .(Achi

## 2020-09-03 ENCOUNTER — OFFICE VISIT (OUTPATIENT)
Dept: OCCUPATIONAL MEDICINE | Facility: HOSPITAL | Age: 45
End: 2020-09-03
Attending: FAMILY MEDICINE
Payer: MEDICAID

## 2020-09-03 PROCEDURE — 97110 THERAPEUTIC EXERCISES: CPT | Performed by: OCCUPATIONAL THERAPIST

## 2020-09-03 PROCEDURE — 97140 MANUAL THERAPY 1/> REGIONS: CPT | Performed by: OCCUPATIONAL THERAPIST

## 2020-09-03 NOTE — PROGRESS NOTES
Dx:     Bilateral hand pain, trigger finger, Biceps tendonitis of right shoulder        Authorized # of Visits: 10       Next MD visit: none scheduled  Fall Risk: standard         Precautions: n/a           Medication Changes since last visit?: Yovana Ozuna 10, cancelled 1 , and no shown  0 visits in Occupational Therapy. Subjective: \"My shoulder hurts me when I fix my hair, my hands when I have to squeeze something. \"    Assessment: Patient's progress has been minimal. She reports only 25% improvement i degrees each for ease in holding brush. .. .(Partial Achieved)  Patient will demonstrate increase in bilateral  strength to at least 35 lbs for ease in opening jars. .. .(Not Achieved)  Patient will demonstrate independence with don/doff trigger finger ort yes

## 2020-09-08 DIAGNOSIS — F41.9 ANXIETY AND DEPRESSION: ICD-10-CM

## 2020-09-08 DIAGNOSIS — F32.A ANXIETY AND DEPRESSION: ICD-10-CM

## 2020-09-10 RX ORDER — VENLAFAXINE HYDROCHLORIDE 75 MG/1
CAPSULE, EXTENDED RELEASE ORAL
Qty: 90 CAPSULE | Refills: 1 | Status: SHIPPED | OUTPATIENT
Start: 2020-09-10 | End: 2021-03-06

## 2020-09-11 RX ORDER — PEN NEEDLE, DIABETIC 32GX 5/32"
NEEDLE, DISPOSABLE MISCELLANEOUS
Qty: 100 EACH | Refills: 2 | Status: SHIPPED | OUTPATIENT
Start: 2020-09-11 | End: 2020-12-11

## 2020-10-01 RX ORDER — DULAGLUTIDE 1.5 MG/.5ML
INJECTION, SOLUTION SUBCUTANEOUS
Qty: 6 ML | Refills: 0 | Status: SHIPPED | OUTPATIENT
Start: 2020-10-01 | End: 2020-11-18

## 2020-11-02 RX ORDER — INSULIN LISPRO 100 U/ML
INJECTION, SOLUTION SUBCUTANEOUS
Qty: 54 ML | Refills: 0 | Status: SHIPPED | OUTPATIENT
Start: 2020-11-02 | End: 2021-01-27

## 2020-11-03 ENCOUNTER — IMMUNIZATION (OUTPATIENT)
Dept: FAMILY MEDICINE CLINIC | Facility: CLINIC | Age: 45
End: 2020-11-03
Payer: MEDICAID

## 2020-11-03 DIAGNOSIS — Z23 NEED FOR VACCINATION: ICD-10-CM

## 2020-11-03 PROCEDURE — 90471 IMMUNIZATION ADMIN: CPT | Performed by: NURSE PRACTITIONER

## 2020-11-03 PROCEDURE — 90686 IIV4 VACC NO PRSV 0.5 ML IM: CPT | Performed by: NURSE PRACTITIONER

## 2020-11-06 DIAGNOSIS — M79.641 BILATERAL HAND PAIN: ICD-10-CM

## 2020-11-06 DIAGNOSIS — M75.21 BICEPS TENDINITIS OF RIGHT SHOULDER: ICD-10-CM

## 2020-11-06 DIAGNOSIS — M79.642 BILATERAL HAND PAIN: ICD-10-CM

## 2020-11-06 DIAGNOSIS — M65.349 TRIGGER RING FINGER, UNSPECIFIED LATERALITY: ICD-10-CM

## 2020-11-06 RX ORDER — MELOXICAM 15 MG/1
15 TABLET ORAL DAILY
Qty: 90 TABLET | Refills: 0 | Status: SHIPPED | OUTPATIENT
Start: 2020-11-06 | End: 2021-02-01

## 2020-11-18 ENCOUNTER — OFFICE VISIT (OUTPATIENT)
Dept: ENDOCRINOLOGY CLINIC | Facility: CLINIC | Age: 45
End: 2020-11-18
Payer: MEDICAID

## 2020-11-18 VITALS
SYSTOLIC BLOOD PRESSURE: 130 MMHG | WEIGHT: 215 LBS | HEART RATE: 101 BPM | BODY MASS INDEX: 41 KG/M2 | DIASTOLIC BLOOD PRESSURE: 79 MMHG

## 2020-11-18 DIAGNOSIS — E11.65 UNCONTROLLED TYPE 2 DIABETES MELLITUS WITH HYPERGLYCEMIA (HCC): Primary | ICD-10-CM

## 2020-11-18 DIAGNOSIS — I10 ESSENTIAL HYPERTENSION: ICD-10-CM

## 2020-11-18 PROCEDURE — 83036 HEMOGLOBIN GLYCOSYLATED A1C: CPT | Performed by: INTERNAL MEDICINE

## 2020-11-18 PROCEDURE — 3075F SYST BP GE 130 - 139MM HG: CPT | Performed by: INTERNAL MEDICINE

## 2020-11-18 PROCEDURE — 36416 COLLJ CAPILLARY BLOOD SPEC: CPT | Performed by: INTERNAL MEDICINE

## 2020-11-18 PROCEDURE — 3078F DIAST BP <80 MM HG: CPT | Performed by: INTERNAL MEDICINE

## 2020-11-18 PROCEDURE — 82947 ASSAY GLUCOSE BLOOD QUANT: CPT | Performed by: INTERNAL MEDICINE

## 2020-11-18 PROCEDURE — 99213 OFFICE O/P EST LOW 20 MIN: CPT | Performed by: INTERNAL MEDICINE

## 2020-11-18 RX ORDER — BLOOD SUGAR DIAGNOSTIC
STRIP MISCELLANEOUS
Qty: 400 STRIP | Refills: 2 | Status: SHIPPED | OUTPATIENT
Start: 2020-11-18

## 2020-11-18 RX ORDER — LANCETS 33 GAUGE
1 EACH MISCELLANEOUS 2 TIMES DAILY
Qty: 400 EACH | Refills: 1 | Status: SHIPPED | OUTPATIENT
Start: 2020-11-18

## 2020-11-18 RX ORDER — DULAGLUTIDE 1.5 MG/.5ML
INJECTION, SOLUTION SUBCUTANEOUS
Qty: 6 ML | Refills: 0 | Status: SHIPPED | OUTPATIENT
Start: 2020-11-18 | End: 2021-02-15

## 2020-11-18 NOTE — PROGRESS NOTES
Name: Justyn Amos  Date: 11/18/2020    Referring Physician: No ref. provider found    HISTORY OF PRESENT ILLNESS   Justyn Amos is a 39year old female who presents for diabetes mellitus, diagnosed over 20 years ago.       Prior HbA, C or glycohemoglobin [id number] Does not apply Misc, USE TO TEST BLOOD SUGAR TWICE A DAY, Disp: 200 each, Rfl: 1  •  ULTRA-CARE ALCOHOL PREP PADS 70 % Does not apply Pads, USE AS DIRECTED TO CLEAN AREA THREE TIMES A DAY, Disp: 100 each, Rfl: 2  •  ATORVASTATIN 20 MG Oral Tab, T 0  •  insulin glargine (BASAGLAR KWIKPEN) 100 UNIT/ML Subcutaneous Solution Pen-injector, Inject 90 Units into the skin nightly. , Disp: , Rfl:      Allergies:   No Known Allergies    Social History:   Social History    Socioeconomic History      Marital s texture  Hair & Nails:  normal scalp hair     Hematologic:  no excessive bruising  Neuro:  sensory grossly intact and motor grossly intact  Psychiatric:  oriented to time, self, and place  Nutritional:  no abnormal weight gain or loss    ASSESSMENT/PLAN:

## 2020-11-23 RX ORDER — ISOPRPOPYL ALCOHOL 70 ML/100ML
SWAB TOPICAL
Qty: 100 EACH | Refills: 5 | Status: SHIPPED | OUTPATIENT
Start: 2020-11-23

## 2020-12-07 RX ORDER — INSULIN GLARGINE 100 [IU]/ML
80 INJECTION, SOLUTION SUBCUTANEOUS DAILY
Qty: 30 ML | Refills: 5 | Status: SHIPPED | OUTPATIENT
Start: 2020-12-07 | End: 2021-06-09

## 2020-12-11 RX ORDER — PEN NEEDLE, DIABETIC 32GX 5/32"
NEEDLE, DISPOSABLE MISCELLANEOUS
Qty: 100 EACH | Refills: 2 | Status: SHIPPED | OUTPATIENT
Start: 2020-12-11

## 2020-12-22 ENCOUNTER — OFFICE VISIT (OUTPATIENT)
Dept: OPTOMETRY | Facility: CLINIC | Age: 45
End: 2020-12-22
Payer: MEDICAID

## 2020-12-22 DIAGNOSIS — H52.4 ASTIGMATISM WITH PRESBYOPIA, BILATERAL: ICD-10-CM

## 2020-12-22 DIAGNOSIS — H53.001 AMBLYOPIA OF RIGHT EYE: Primary | ICD-10-CM

## 2020-12-22 DIAGNOSIS — H52.203 ASTIGMATISM WITH PRESBYOPIA, BILATERAL: ICD-10-CM

## 2020-12-22 PROCEDURE — 99202 OFFICE O/P NEW SF 15 MIN: CPT | Performed by: OPTOMETRIST

## 2020-12-22 PROCEDURE — 92015 DETERMINE REFRACTIVE STATE: CPT | Performed by: OPTOMETRIST

## 2020-12-22 NOTE — PATIENT INSTRUCTIONS
Amblyopia  No treatment is required. Will continue to observe. Astigmatism with presbyopia, bilateral  New glasses RX given to update as needed.   [facility]

## 2020-12-22 NOTE — ASSESSMENT & PLAN NOTE
New glasses RX given to update as needed.   Patient will continue to followup with [facility] for EE and treatment

## 2020-12-22 NOTE — PROGRESS NOTES
Natasha Grant is a 39year old female. HPI:     HPI     Recheck     Comments: Patient has single vision glasses and she is having a hard time seeing small parts at her job. She is straining to see.               Comments     Patient needs new glasses and tobacco: Never Used    Alcohol use: No    Drug use: No      Medications:  Current Outpatient Medications   Medication Sig Dispense Refill   • TRUEPLUS PEN NEEDLES 32G X 4 MM Does not apply Misc USE TO INJECT INSULIN THREE TIMES A DAY OR AS DIRECTED 100 eac nightly. • Insulin Pen Needle (PEN NEEDLES) 32G X 4 MM Does not apply Misc Inject 1 each into the skin 4 (four) times daily.  150 each 5   • Blood Glucose Monitoring Suppl (TRUE METRIX METER) w/Device Does not apply Kit 1 kit by Does not apply route da This Visit:  Requested Prescriptions      No prescriptions requested or ordered in this encounter        Follow up instructions:  No follow-ups on file.     12/22/2020  Scribed by: Jeremy Rodriguez

## 2021-01-18 DIAGNOSIS — E11.29 UNCONTROLLED DIABETES MELLITUS WITH MICROALBUMINURIA, WITH LONG-TERM CURRENT USE OF INSULIN (HCC): ICD-10-CM

## 2021-01-18 DIAGNOSIS — Z79.4 UNCONTROLLED DIABETES MELLITUS WITH MICROALBUMINURIA, WITH LONG-TERM CURRENT USE OF INSULIN (HCC): ICD-10-CM

## 2021-01-18 DIAGNOSIS — E78.5 HYPERLIPIDEMIA, UNSPECIFIED HYPERLIPIDEMIA TYPE: ICD-10-CM

## 2021-01-18 DIAGNOSIS — R80.9 UNCONTROLLED DIABETES MELLITUS WITH MICROALBUMINURIA, WITH LONG-TERM CURRENT USE OF INSULIN (HCC): ICD-10-CM

## 2021-01-18 DIAGNOSIS — E78.2 MIXED HYPERLIPIDEMIA: ICD-10-CM

## 2021-01-18 DIAGNOSIS — E11.65 UNCONTROLLED DIABETES MELLITUS WITH MICROALBUMINURIA, WITH LONG-TERM CURRENT USE OF INSULIN (HCC): ICD-10-CM

## 2021-01-18 RX ORDER — ATORVASTATIN CALCIUM 20 MG/1
TABLET, FILM COATED ORAL
Qty: 90 TABLET | Refills: 1 | Status: SHIPPED | OUTPATIENT
Start: 2021-01-18 | End: 2021-06-15

## 2021-01-27 RX ORDER — INSULIN LISPRO 100 U/ML
INJECTION, SOLUTION SUBCUTANEOUS
Qty: 54 ML | Refills: 0 | Status: SHIPPED | OUTPATIENT
Start: 2021-01-27 | End: 2021-03-17

## 2021-02-01 DIAGNOSIS — M79.641 BILATERAL HAND PAIN: ICD-10-CM

## 2021-02-01 DIAGNOSIS — M65.349 TRIGGER RING FINGER, UNSPECIFIED LATERALITY: ICD-10-CM

## 2021-02-01 DIAGNOSIS — M79.642 BILATERAL HAND PAIN: ICD-10-CM

## 2021-02-01 DIAGNOSIS — M75.21 BICEPS TENDINITIS OF RIGHT SHOULDER: ICD-10-CM

## 2021-02-01 RX ORDER — MELOXICAM 15 MG/1
15 TABLET ORAL DAILY
Qty: 90 TABLET | Refills: 0 | Status: SHIPPED | OUTPATIENT
Start: 2021-02-01 | End: 2021-03-11

## 2021-02-15 RX ORDER — DULAGLUTIDE 1.5 MG/.5ML
INJECTION, SOLUTION SUBCUTANEOUS
Qty: 6 ML | Refills: 0 | Status: SHIPPED | OUTPATIENT
Start: 2021-02-15 | End: 2021-04-28

## 2021-03-05 DIAGNOSIS — F41.9 ANXIETY AND DEPRESSION: ICD-10-CM

## 2021-03-05 DIAGNOSIS — F32.A ANXIETY AND DEPRESSION: ICD-10-CM

## 2021-03-06 RX ORDER — VENLAFAXINE HYDROCHLORIDE 75 MG/1
CAPSULE, EXTENDED RELEASE ORAL
Qty: 90 CAPSULE | Refills: 0 | Status: SHIPPED | OUTPATIENT
Start: 2021-03-06 | End: 2021-06-15

## 2021-03-11 ENCOUNTER — OFFICE VISIT (OUTPATIENT)
Dept: FAMILY MEDICINE CLINIC | Facility: CLINIC | Age: 46
End: 2021-03-11
Payer: MEDICAID

## 2021-03-11 VITALS
HEIGHT: 61 IN | SYSTOLIC BLOOD PRESSURE: 127 MMHG | WEIGHT: 219.81 LBS | HEART RATE: 101 BPM | BODY MASS INDEX: 41.5 KG/M2 | TEMPERATURE: 98 F | DIASTOLIC BLOOD PRESSURE: 81 MMHG

## 2021-03-11 DIAGNOSIS — Z79.4 CONTROLLED TYPE 2 DIABETES MELLITUS WITHOUT COMPLICATION, WITH LONG-TERM CURRENT USE OF INSULIN (HCC): ICD-10-CM

## 2021-03-11 DIAGNOSIS — E11.9 CONTROLLED TYPE 2 DIABETES MELLITUS WITHOUT COMPLICATION, WITH LONG-TERM CURRENT USE OF INSULIN (HCC): ICD-10-CM

## 2021-03-11 DIAGNOSIS — Z12.31 VISIT FOR SCREENING MAMMOGRAM: ICD-10-CM

## 2021-03-11 DIAGNOSIS — F41.9 ANXIETY AND DEPRESSION: ICD-10-CM

## 2021-03-11 DIAGNOSIS — E66.01 MORBID OBESITY DUE TO EXCESS CALORIES (HCC): ICD-10-CM

## 2021-03-11 DIAGNOSIS — Z00.00 ADULT GENERAL MEDICAL EXAM: Primary | ICD-10-CM

## 2021-03-11 DIAGNOSIS — F32.A ANXIETY AND DEPRESSION: ICD-10-CM

## 2021-03-11 PROCEDURE — 3074F SYST BP LT 130 MM HG: CPT | Performed by: FAMILY MEDICINE

## 2021-03-11 PROCEDURE — 99396 PREV VISIT EST AGE 40-64: CPT | Performed by: FAMILY MEDICINE

## 2021-03-11 PROCEDURE — 3008F BODY MASS INDEX DOCD: CPT | Performed by: FAMILY MEDICINE

## 2021-03-11 PROCEDURE — 3079F DIAST BP 80-89 MM HG: CPT | Performed by: FAMILY MEDICINE

## 2021-03-11 NOTE — PROGRESS NOTES
Patient ID: Allison Harvey is a 39year old female. HPI  Patient presents with:  Physical    Last seen by me on 7/14/2020. Pt c/o body aches which she states might be due to her diabetes.  She is not very active at home and is not currently working be LYPERCENT 35.1 01/30/2020    MOPERCENT 3.8 01/30/2020    EOPERCENT 1.4 01/30/2020    BAPERCENT 0.6 01/30/2020    NE 7.65 01/30/2020    LYMABS 4.57 (H) 01/30/2020    MOABSO 0.50 01/30/2020    EOABSO 0.18 01/30/2020    BAABSO 0.08 01/30/2020       Lab Result (mIU/mL)   Date Value   10/16/2019 1.950     TSH (S) (uIU/mL)   Date Value   12/05/2015 1.46   =======================================================    Wt Readings from Last 6 Encounters:  03/11/21 : 219 lb 12.8 oz  11/18/20 : 215 lb  07/14/20 : 218 lb 1 Highest education level: Not on file    Occupational History      Not on file    Tobacco Use      Smoking status: Never Smoker      Smokeless tobacco: Never Used    Vaping Use      Vaping Use: Never used    Substance and Sexual Activity      Alcohol use: N MOUTH NIGHTLY. 90 tablet 1   • TRUEPLUS PEN NEEDLES 32G X 4 MM Does not apply Misc USE TO INJECT INSULIN THREE TIMES A DAY OR AS DIRECTED 100 each 2   • BASAGLAR KWIKPEN 100 UNIT/ML Subcutaneous Solution Pen-injector INJECT 80 UNITS INTO THE SKIN DAILY.  27 Normal rate, regular rhythm and no murmur heard. Pulmonary/Chest: Effort normal and breath sounds normal. No respiratory distress. Abdominal: Soft. Bowel sounds are normal. There is no hepatosplenomegaly. There is no tenderness.    Neurological: She is people who skip breakfast do not lose weight. I myself have 3-4 \"Brown 'N Serve\" sausages every morning and you can microwave these for 1 minute and eat these quickly and be on your way to work.   Eating Oatmeal in the morning 5 times per week also can b

## 2021-03-17 RX ORDER — INSULIN LISPRO 100 [IU]/ML
20 INJECTION, SOLUTION INTRAVENOUS; SUBCUTANEOUS
Qty: 18 ML | Refills: 0 | Status: SHIPPED | OUTPATIENT
Start: 2021-03-17 | End: 2021-04-16

## 2021-03-17 NOTE — TELEPHONE ENCOUNTER
LOV: 11/18/20 RTC 4 months   LR: 01/27/21    Called patient and scheduled below appointment.     Future Appointments   Date Time Provider Annita Christie   4/28/2021  1:30 PM Abe Silver MD [facility] AD[facility]     Pended script for 30 days supply for timmy Tolerating OG feedings; nippling on hold due to increased WOB and desaturations with nippling attempts.  8/2  Mom updated by Dr. Choi about plans to transfer baby for further evaluation of reflux today. Mother verbalized understanding.   Plan: Adjust feeds as needed to maintain 150-160 ml/kg/day for adequate weight gain. Follow clinically. Continue hold nippling attempts.

## 2021-04-13 ENCOUNTER — TELEPHONE (OUTPATIENT)
Dept: FAMILY MEDICINE CLINIC | Facility: CLINIC | Age: 46
End: 2021-04-13

## 2021-04-13 NOTE — TELEPHONE ENCOUNTER
Patient wanted to inform Dr. Bon Ponce that she will complete the orders he requested after her appointment with Dr. Tatiana Virgen on 4/28 to avoid going to the lab multiple times.

## 2021-04-28 ENCOUNTER — OFFICE VISIT (OUTPATIENT)
Dept: ENDOCRINOLOGY CLINIC | Facility: CLINIC | Age: 46
End: 2021-04-28
Payer: MEDICAID

## 2021-04-28 VITALS
BODY MASS INDEX: 42 KG/M2 | WEIGHT: 223 LBS | HEART RATE: 96 BPM | SYSTOLIC BLOOD PRESSURE: 132 MMHG | DIASTOLIC BLOOD PRESSURE: 81 MMHG

## 2021-04-28 DIAGNOSIS — E11.29 UNCONTROLLED DIABETES MELLITUS WITH MICROALBUMINURIA, WITH LONG-TERM CURRENT USE OF INSULIN (HCC): Primary | ICD-10-CM

## 2021-04-28 DIAGNOSIS — Z79.4 UNCONTROLLED DIABETES MELLITUS WITH MICROALBUMINURIA, WITH LONG-TERM CURRENT USE OF INSULIN (HCC): Primary | ICD-10-CM

## 2021-04-28 DIAGNOSIS — R80.9 UNCONTROLLED DIABETES MELLITUS WITH MICROALBUMINURIA, WITH LONG-TERM CURRENT USE OF INSULIN (HCC): Primary | ICD-10-CM

## 2021-04-28 DIAGNOSIS — E11.65 UNCONTROLLED DIABETES MELLITUS WITH MICROALBUMINURIA, WITH LONG-TERM CURRENT USE OF INSULIN (HCC): Primary | ICD-10-CM

## 2021-04-28 PROCEDURE — 83036 HEMOGLOBIN GLYCOSYLATED A1C: CPT | Performed by: INTERNAL MEDICINE

## 2021-04-28 PROCEDURE — 82947 ASSAY GLUCOSE BLOOD QUANT: CPT | Performed by: INTERNAL MEDICINE

## 2021-04-28 PROCEDURE — 99214 OFFICE O/P EST MOD 30 MIN: CPT | Performed by: INTERNAL MEDICINE

## 2021-04-28 PROCEDURE — 3075F SYST BP GE 130 - 139MM HG: CPT | Performed by: INTERNAL MEDICINE

## 2021-04-28 PROCEDURE — 36416 COLLJ CAPILLARY BLOOD SPEC: CPT | Performed by: INTERNAL MEDICINE

## 2021-04-28 PROCEDURE — 3079F DIAST BP 80-89 MM HG: CPT | Performed by: INTERNAL MEDICINE

## 2021-04-28 RX ORDER — SEMAGLUTIDE 1.34 MG/ML
1 INJECTION, SOLUTION SUBCUTANEOUS WEEKLY
Qty: 3 ML | Refills: 3 | Status: SHIPPED | OUTPATIENT
Start: 2021-05-28 | End: 2021-05-06

## 2021-04-28 RX ORDER — SEMAGLUTIDE 1.34 MG/ML
0.5 INJECTION, SOLUTION SUBCUTANEOUS WEEKLY
Qty: 1.5 ML | Refills: 0 | Status: SHIPPED | OUTPATIENT
Start: 2021-04-28 | End: 2021-05-06

## 2021-04-28 NOTE — PROGRESS NOTES
HPI:   Justyn Amos is a 39year old female who presents for a complete physical exam.   No LMP recorded. (Menstrual status: Tubal Ligation). Pt of Dr. Mery Grove. Reports issues with her diabetes right now and depression. Taking her medications regularly. (ONETOUCH VERIO) In Vitro Strip CHECK BLOOD SUGAR 4 TIMES PER  strip 2   • LOSARTAN POTASSIUM 50 MG Oral Tab TAKE 1 TABLET (50 MG TOTAL) BY MOUTH DAILY.  90 tablet 2   • Glucose Blood (CONTOUR NEXT TEST) In Vitro Strip Check 2 times per day 200 strip History:   Social History    Tobacco Use      Smoking status: Never Smoker      Smokeless tobacco: Never Used    Vaping Use      Vaping Use: Never used    Alcohol use: No    Drug use: No    Exercise:  Diet:     REVIEW OF SYSTEMS:   GENERAL: feels well othe finding  aldara cream to tag on labia     Pap and pelvic done. Has order for mammogram.. Self breast exam explained. Health maintenance. Pt' s weight is Body mass index is 42.06 kg/m². , recommended low fat diet and aerobic exercise 30 minutes three times w

## 2021-04-28 NOTE — PATIENT INSTRUCTIONS
Continue Basaglar 80 units subcutaneous daily    Continue Admelog 20 units subcutaneous 3 times per day    STOP Trulicity    START Ozempic 0.5mg subcutaneous weekly for one month then increase to 1mg subcutaneous weekly

## 2021-04-28 NOTE — PROGRESS NOTES
Name: Bernardino Peterson  Date: 4/28/2021    Referring Physician: No ref. provider found    HISTORY OF PRESENT ILLNESS   Bernardino Peterson is a 39year old female who presents for diabetes mellitus, diagnosed over 20 years ago.       Prior HbA, C or glycohemoglobin TOTAL) BY MOUTH NIGHTLY., Disp: 90 tablet, Rfl: 1  •  TRUEPLUS PEN NEEDLES 32G X 4 MM Does not apply Misc, USE TO INJECT INSULIN THREE TIMES A DAY OR AS DIRECTED, Disp: 100 each, Rfl: 2  •  BASAGLAR KWIKPEN 100 UNIT/ML Subcutaneous Solution Pen-injector, I Smoker      Smokeless tobacco: Never Used    Vaping Use      Vaping Use: Never used    Substance and Sexual Activity      Alcohol use: No      Drug use: No      Medical History:   Past Medical History:   Diagnosis Date   • Amblyopia     OD   • Diabetes (HC importance of glycemic control to prevent complications of diabetes  -Discussed complications of diabetes include retinopathy, neuropathy, nephropathy and cardiovascular disease  -Discussed importance of SBGM  -Discussed importance of low CHO diet  -Contin

## 2021-04-30 ENCOUNTER — TELEPHONE (OUTPATIENT)
Dept: ENDOCRINOLOGY CLINIC | Facility: CLINIC | Age: 46
End: 2021-04-30

## 2021-04-30 NOTE — TELEPHONE ENCOUNTER
Prior authorization request for:      •  Semaglutide,0.25 or 0.5MG/DOS, (OZEMPIC, 0.25 OR 0.5 MG/DOSE,) 2 MG/1.5ML Subcutaneous Solution Pen-injector, Inject 0.5 mg into the skin once a week., Disp: 1.5 mL, Rfl: 0      MESSAGE:  The above medication is not

## 2021-04-30 NOTE — TELEPHONE ENCOUNTER
Medication PA Requested: •  Semaglutide,0.25 or 0.5MG/DOS, (OZEMPIC, 0.25 OR 0.5 MG/DOSE,) 2 MG/1.5ML Subcutaneous Solution Pen-injector, Disp: 1.5 mL, Rfl: 0                                                         CoverMyMeds Used:  Key:  Sig:  Inject 0.5

## 2021-05-03 RX ORDER — INSULIN LISPRO 100 U/ML
INJECTION, SOLUTION SUBCUTANEOUS
Qty: 54 ML | Refills: 0 | Status: SHIPPED | OUTPATIENT
Start: 2021-05-03 | End: 2021-07-19

## 2021-05-04 NOTE — TELEPHONE ENCOUNTER
Received fax from [facility] stating that it is Partially approved due to unmet criteria. \"You must have tried and failed two preferred drugs that are used to treat your condition.  They must be appropriate for your condition and must be trie

## 2021-05-06 RX ORDER — DULAGLUTIDE 3 MG/.5ML
INJECTION, SOLUTION SUBCUTANEOUS
Qty: 2 ML | Refills: 1 | Status: SHIPPED | OUTPATIENT
Start: 2021-06-06 | End: 2021-11-22

## 2021-05-06 NOTE — TELEPHONE ENCOUNTER
rn called patient with new instructions for trulicity, verbalized understanding to increase to 3 mg x1 month the up to 4.5 mg.  rx sent to pharmacy.

## 2021-05-06 NOTE — TELEPHONE ENCOUNTER
Ok change back to Trulicity 3mg subcutaneous weekly for one month then increase to Trulicity 2.6XS subcutaneous weekly. Thanks.

## 2021-05-06 NOTE — TELEPHONE ENCOUNTER
Dr. Reynaldo Lopez to patient (via  Dwayne Bhatia #[id number]) regarding trying Victoza -she stated she tried Victoza about 6 years ago for weight loss and it did not work for her.     Per LOV dtd 3/97/23, Trulicity d/c'd to start Ozempic - patient states sh

## 2021-06-09 ENCOUNTER — TELEPHONE (OUTPATIENT)
Dept: ENDOCRINOLOGY CLINIC | Facility: CLINIC | Age: 46
End: 2021-06-09

## 2021-06-09 RX ORDER — INSULIN GLARGINE 100 [IU]/ML
80 INJECTION, SOLUTION SUBCUTANEOUS DAILY
Qty: 72 ML | Refills: 1 | Status: SHIPPED | OUTPATIENT
Start: 2021-06-09 | End: 2021-11-01

## 2021-06-09 NOTE — TELEPHONE ENCOUNTER
Patient is asking if there is anything that can be done by provider for her to be able to receive all of her diabetic medications at once from the pharmacy.  Patient states she has been having trouble getting medications and has to make several trips within

## 2021-06-10 ENCOUNTER — LAB ENCOUNTER (OUTPATIENT)
Dept: LAB | Age: 46
End: 2021-06-10
Attending: FAMILY MEDICINE
Payer: MEDICAID

## 2021-06-10 DIAGNOSIS — Z00.00 ADULT GENERAL MEDICAL EXAM: ICD-10-CM

## 2021-06-10 DIAGNOSIS — Z79.4 CONTROLLED TYPE 2 DIABETES MELLITUS WITHOUT COMPLICATION, WITH LONG-TERM CURRENT USE OF INSULIN (HCC): ICD-10-CM

## 2021-06-10 DIAGNOSIS — E11.9 CONTROLLED TYPE 2 DIABETES MELLITUS WITHOUT COMPLICATION, WITH LONG-TERM CURRENT USE OF INSULIN (HCC): ICD-10-CM

## 2021-06-10 PROCEDURE — 84443 ASSAY THYROID STIM HORMONE: CPT

## 2021-06-10 PROCEDURE — 82570 ASSAY OF URINE CREATININE: CPT

## 2021-06-10 PROCEDURE — 82043 UR ALBUMIN QUANTITATIVE: CPT

## 2021-06-10 PROCEDURE — 85025 COMPLETE CBC W/AUTO DIFF WBC: CPT

## 2021-06-10 PROCEDURE — 3051F HG A1C>EQUAL 7.0%<8.0%: CPT | Performed by: FAMILY MEDICINE

## 2021-06-10 PROCEDURE — 80053 COMPREHEN METABOLIC PANEL: CPT

## 2021-06-10 PROCEDURE — 3060F POS MICROALBUMINURIA REV: CPT | Performed by: FAMILY MEDICINE

## 2021-06-10 PROCEDURE — 80061 LIPID PANEL: CPT

## 2021-06-10 PROCEDURE — 83036 HEMOGLOBIN GLYCOSYLATED A1C: CPT

## 2021-06-10 PROCEDURE — 3061F NEG MICROALBUMINURIA REV: CPT | Performed by: FAMILY MEDICINE

## 2021-06-10 PROCEDURE — 36415 COLL VENOUS BLD VENIPUNCTURE: CPT

## 2021-06-10 NOTE — TELEPHONE ENCOUNTER
Spoke to patient via , Becca Blanton #[id number] - advised patient that RXs are sent as 90-day supply unless there is potential dose change (Trulicity) and that insurance may dictate if medication is dispensed for 30 or 90-days  Patient stated understandi

## 2021-06-15 ENCOUNTER — OFFICE VISIT (OUTPATIENT)
Dept: FAMILY MEDICINE CLINIC | Facility: CLINIC | Age: 46
End: 2021-06-15
Payer: MEDICAID

## 2021-06-15 VITALS
BODY MASS INDEX: 42.29 KG/M2 | DIASTOLIC BLOOD PRESSURE: 81 MMHG | SYSTOLIC BLOOD PRESSURE: 132 MMHG | TEMPERATURE: 98 F | WEIGHT: 224 LBS | HEART RATE: 96 BPM | HEIGHT: 61 IN

## 2021-06-15 DIAGNOSIS — E11.29 UNCONTROLLED DIABETES MELLITUS WITH MICROALBUMINURIA, WITH LONG-TERM CURRENT USE OF INSULIN (HCC): Primary | ICD-10-CM

## 2021-06-15 DIAGNOSIS — Z79.4 UNCONTROLLED DIABETES MELLITUS WITH MICROALBUMINURIA, WITH LONG-TERM CURRENT USE OF INSULIN (HCC): Primary | ICD-10-CM

## 2021-06-15 DIAGNOSIS — F32.A ANXIETY AND DEPRESSION: ICD-10-CM

## 2021-06-15 DIAGNOSIS — R80.9 UNCONTROLLED DIABETES MELLITUS WITH MICROALBUMINURIA, WITH LONG-TERM CURRENT USE OF INSULIN (HCC): Primary | ICD-10-CM

## 2021-06-15 DIAGNOSIS — I10 ESSENTIAL HYPERTENSION: ICD-10-CM

## 2021-06-15 DIAGNOSIS — E78.5 HYPERLIPIDEMIA, UNSPECIFIED HYPERLIPIDEMIA TYPE: ICD-10-CM

## 2021-06-15 DIAGNOSIS — R60.0 BILATERAL LEG EDEMA: ICD-10-CM

## 2021-06-15 DIAGNOSIS — E78.2 MIXED HYPERLIPIDEMIA: ICD-10-CM

## 2021-06-15 DIAGNOSIS — F41.9 ANXIETY AND DEPRESSION: ICD-10-CM

## 2021-06-15 DIAGNOSIS — E66.01 MORBID OBESITY DUE TO EXCESS CALORIES (HCC): ICD-10-CM

## 2021-06-15 DIAGNOSIS — E11.65 UNCONTROLLED DIABETES MELLITUS WITH MICROALBUMINURIA, WITH LONG-TERM CURRENT USE OF INSULIN (HCC): Primary | ICD-10-CM

## 2021-06-15 PROCEDURE — 3079F DIAST BP 80-89 MM HG: CPT | Performed by: FAMILY MEDICINE

## 2021-06-15 PROCEDURE — 3075F SYST BP GE 130 - 139MM HG: CPT | Performed by: FAMILY MEDICINE

## 2021-06-15 PROCEDURE — 99214 OFFICE O/P EST MOD 30 MIN: CPT | Performed by: FAMILY MEDICINE

## 2021-06-15 PROCEDURE — 3008F BODY MASS INDEX DOCD: CPT | Performed by: FAMILY MEDICINE

## 2021-06-15 RX ORDER — LOSARTAN POTASSIUM AND HYDROCHLOROTHIAZIDE 12.5; 5 MG/1; MG/1
1 TABLET ORAL DAILY
Qty: 90 TABLET | Refills: 1 | Status: SHIPPED | OUTPATIENT
Start: 2021-06-15 | End: 2021-09-11

## 2021-06-15 RX ORDER — ATORVASTATIN CALCIUM 40 MG/1
40 TABLET, FILM COATED ORAL NIGHTLY
Qty: 90 TABLET | Refills: 1 | Status: SHIPPED | OUTPATIENT
Start: 2021-06-15 | End: 2021-09-12

## 2021-06-15 RX ORDER — VENLAFAXINE HYDROCHLORIDE 75 MG/1
75 CAPSULE, EXTENDED RELEASE ORAL DAILY
Qty: 90 CAPSULE | Refills: 0 | Status: SHIPPED | OUTPATIENT
Start: 2021-06-15 | End: 2021-12-11

## 2021-06-15 NOTE — PROGRESS NOTES
Patient ID: Elizabeth Burr is a 42year old female. HPI  Patient presents with: Follow - Up: Lab results    Last seen by me on 3/11/2021. Pt c/o water retention in the legs bilaterally. Pt is unable to shave her legs as this causes irritation.  Pt's EOABSO 0.20 06/10/2021    BAABSO 0.08 06/10/2021       Lab Results   Component Value Date     (H) 06/10/2021    BUN 17 06/10/2021    BUNCREA 28.8 (H) 06/10/2021    CREATSERUM 0.59 06/10/2021    ANIONGAP 5 06/10/2021    GFRNAA 113 06/10/2021    GFRAA Encounters:  06/15/21 : 224 lb  04/28/21 : 223 lb  04/28/21 : 222 lb 9.6 oz  03/11/21 : 219 lb 12.8 oz  11/18/20 : 215 lb  07/14/20 : 218 lb 1.9 oz      BMI Readings from Last 6 Encounters:  06/15/21 : 42.32 kg/m²  04/28/21 : 42.14 kg/m²  04/28/21 : 42.06 Inject 3 mg into the skin once a week for 30 days, THEN 4.5 mg once a week. 2 mL 1   • ADMELOG SOLOSTAR 100 UNIT/ML Subcutaneous Solution Pen-injector INJECT 20 UNITS INTO THE SKIN 3 (THREE) TIMES DAILY WITH MEALS.  54 mL 0   • Imiquimod (ALDARA) 5 % Extern pen as directed       Allergies:No Known Allergies     Physical Exam:       Physical Exam  Blood pressure 132/81, pulse 96, temperature 97.6 °F (36.4 °C), height 5' 1\" (1.549 m), weight 224 lb, not currently breastfeeding.     Physical Exam   [facility]  Work on portion sizes. The nutritionist agrees and already spoke to her about this  Anxiety and depression  -     Venlafaxine HCl ER 75 MG Oral Capsule SR 24 Hr; Take 1 capsule (75 mg total) by mouth daily.   Doing well with her mood and will contin

## 2021-07-06 RX ORDER — DULAGLUTIDE 4.5 MG/.5ML
INJECTION, SOLUTION SUBCUTANEOUS
Qty: 2 ML | Refills: 0 | Status: SHIPPED | OUTPATIENT
Start: 2021-07-06 | End: 2021-08-02

## 2021-07-19 RX ORDER — INSULIN LISPRO 100 U/ML
INJECTION, SOLUTION SUBCUTANEOUS
Qty: 54 ML | Refills: 0 | Status: SHIPPED | OUTPATIENT
Start: 2021-07-19 | End: 2021-09-27

## 2021-07-22 ENCOUNTER — OFFICE VISIT (OUTPATIENT)
Dept: FAMILY MEDICINE CLINIC | Facility: CLINIC | Age: 46
End: 2021-07-22
Payer: MEDICAID

## 2021-07-22 VITALS
DIASTOLIC BLOOD PRESSURE: 76 MMHG | HEIGHT: 61 IN | BODY MASS INDEX: 41.47 KG/M2 | TEMPERATURE: 98 F | WEIGHT: 219.63 LBS | SYSTOLIC BLOOD PRESSURE: 135 MMHG | HEART RATE: 92 BPM

## 2021-07-22 DIAGNOSIS — R80.9 UNCONTROLLED DIABETES MELLITUS WITH MICROALBUMINURIA, WITH LONG-TERM CURRENT USE OF INSULIN (HCC): ICD-10-CM

## 2021-07-22 DIAGNOSIS — E11.29 UNCONTROLLED DIABETES MELLITUS WITH MICROALBUMINURIA, WITH LONG-TERM CURRENT USE OF INSULIN (HCC): ICD-10-CM

## 2021-07-22 DIAGNOSIS — E78.5 HYPERLIPIDEMIA, UNSPECIFIED HYPERLIPIDEMIA TYPE: ICD-10-CM

## 2021-07-22 DIAGNOSIS — Z79.4 UNCONTROLLED DIABETES MELLITUS WITH MICROALBUMINURIA, WITH LONG-TERM CURRENT USE OF INSULIN (HCC): ICD-10-CM

## 2021-07-22 DIAGNOSIS — R30.0 BURNING WITH URINATION: ICD-10-CM

## 2021-07-22 DIAGNOSIS — I10 ESSENTIAL HYPERTENSION: ICD-10-CM

## 2021-07-22 DIAGNOSIS — R35.0 URINATION FREQUENCY: Primary | ICD-10-CM

## 2021-07-22 DIAGNOSIS — E11.65 UNCONTROLLED DIABETES MELLITUS WITH MICROALBUMINURIA, WITH LONG-TERM CURRENT USE OF INSULIN (HCC): ICD-10-CM

## 2021-07-22 LAB
APPEARANCE: CLEAR
BILIRUBIN: NEGATIVE
GLUCOSE (URINE DIPSTICK): NEGATIVE MG/DL
MULTISTIX LOT#: ABNORMAL NUMERIC
NITRITE, URINE: NEGATIVE
PH, URINE: 6 (ref 4.5–8)
SPECIFIC GRAVITY: 1.02 (ref 1–1.03)
URINE-COLOR: YELLOW
UROBILINOGEN,SEMI-QN: 0.2 MG/DL (ref 0–1.9)

## 2021-07-22 PROCEDURE — 81003 URINALYSIS AUTO W/O SCOPE: CPT | Performed by: FAMILY MEDICINE

## 2021-07-22 PROCEDURE — 3075F SYST BP GE 130 - 139MM HG: CPT | Performed by: FAMILY MEDICINE

## 2021-07-22 PROCEDURE — 99214 OFFICE O/P EST MOD 30 MIN: CPT | Performed by: FAMILY MEDICINE

## 2021-07-22 PROCEDURE — 3078F DIAST BP <80 MM HG: CPT | Performed by: FAMILY MEDICINE

## 2021-07-22 PROCEDURE — 3008F BODY MASS INDEX DOCD: CPT | Performed by: FAMILY MEDICINE

## 2021-07-22 RX ORDER — NITROFURANTOIN 25; 75 MG/1; MG/1
100 CAPSULE ORAL 2 TIMES DAILY
Qty: 10 CAPSULE | Refills: 0 | Status: SHIPPED | OUTPATIENT
Start: 2021-07-22 | End: 2021-07-27

## 2021-07-22 NOTE — PROGRESS NOTES
Patient ID: Balaji Bee is a 42year old female. HPI  Patient presents with:  Hypertension: follow up  Burning On Urination  Urinary Frequency    Last seen by me on 6/15/2021. Pt c/o burning on urination and increased urinary frequency x1 week.  Pt Hyperlipidemia    • Pregnancy     12 hr labor , [facility], 33 1.2 week, 6 lb(s) Male / Jorgito Syed ([address]) Baby sent to [facility] for 1 month due to breathing issues   • Pregnancy     8 hr labor  ; [facility] ULTRA-CARE ALCOHOL PREP PADS 70 % Does not apply Pads USE AS DIRECTED TO CLEAN AREA THREE TIMES A  each 5   • Lancets (ONETOUCH DELICA PLUS [id number]) Does not apply Misc 1 strip by In Vitro route 2 (two) times daily.  Check 4 times per day 400 each Diagnoses and all orders for this visit:    Urination frequency  -     URINALYSIS, AUTO, W/O SCOPE  -     Nitrofurantoin Monohyd Macro (MACROBID) 100 MG Oral Cap;  Take 1 capsule (100 mg total) by mouth 2 (two) times daily for 5 days.  -     URINE CULT applicable) and agree that the record reflects my personal performance and is accurate and complete.   Tita Valdovinos DO, 7/22/2021, 7:46 PM

## 2021-08-02 RX ORDER — DULAGLUTIDE 4.5 MG/.5ML
INJECTION, SOLUTION SUBCUTANEOUS
Qty: 2 ML | Refills: 0 | Status: SHIPPED | OUTPATIENT
Start: 2021-08-02 | End: 2021-08-30

## 2021-08-30 RX ORDER — DULAGLUTIDE 4.5 MG/.5ML
INJECTION, SOLUTION SUBCUTANEOUS
Qty: 2 ML | Refills: 0 | Status: SHIPPED | OUTPATIENT
Start: 2021-08-30 | End: 2021-09-13

## 2021-09-01 ENCOUNTER — OFFICE VISIT (OUTPATIENT)
Dept: ENDOCRINOLOGY CLINIC | Facility: CLINIC | Age: 46
End: 2021-09-01
Payer: MEDICAID

## 2021-09-01 VITALS
BODY MASS INDEX: 40 KG/M2 | HEART RATE: 90 BPM | DIASTOLIC BLOOD PRESSURE: 78 MMHG | SYSTOLIC BLOOD PRESSURE: 120 MMHG | WEIGHT: 213 LBS

## 2021-09-01 DIAGNOSIS — Z79.4 UNCONTROLLED DIABETES MELLITUS WITH MICROALBUMINURIA, WITH LONG-TERM CURRENT USE OF INSULIN (HCC): Primary | ICD-10-CM

## 2021-09-01 DIAGNOSIS — E11.29 UNCONTROLLED DIABETES MELLITUS WITH MICROALBUMINURIA, WITH LONG-TERM CURRENT USE OF INSULIN (HCC): Primary | ICD-10-CM

## 2021-09-01 DIAGNOSIS — R80.9 UNCONTROLLED DIABETES MELLITUS WITH MICROALBUMINURIA, WITH LONG-TERM CURRENT USE OF INSULIN (HCC): Primary | ICD-10-CM

## 2021-09-01 DIAGNOSIS — E11.65 UNCONTROLLED DIABETES MELLITUS WITH MICROALBUMINURIA, WITH LONG-TERM CURRENT USE OF INSULIN (HCC): Primary | ICD-10-CM

## 2021-09-01 LAB
CARTRIDGE LOT#: ABNORMAL NUMERIC
GLUCOSE BLOOD: 170
HEMOGLOBIN A1C: 7.8 % (ref 4.3–5.6)
TEST STRIP LOT #: NORMAL NUMERIC

## 2021-09-01 PROCEDURE — 3074F SYST BP LT 130 MM HG: CPT | Performed by: NURSE PRACTITIONER

## 2021-09-01 PROCEDURE — 82947 ASSAY GLUCOSE BLOOD QUANT: CPT | Performed by: NURSE PRACTITIONER

## 2021-09-01 PROCEDURE — 3051F HG A1C>EQUAL 7.0%<8.0%: CPT | Performed by: NURSE PRACTITIONER

## 2021-09-01 PROCEDURE — 99215 OFFICE O/P EST HI 40 MIN: CPT | Performed by: NURSE PRACTITIONER

## 2021-09-01 PROCEDURE — 83036 HEMOGLOBIN GLYCOSYLATED A1C: CPT | Performed by: NURSE PRACTITIONER

## 2021-09-01 PROCEDURE — 3078F DIAST BP <80 MM HG: CPT | Performed by: NURSE PRACTITIONER

## 2021-09-01 NOTE — PROGRESS NOTES
Name: Altamese Frankel  Date: 9/1/2021    CHIEF COMPLAINT   Patient presents with:  Diabetes: Pt is here to follow up on DM. HISTORY OF PRESENT ILLNESS   Altamese Frankel is a 42year old female who presents for follow up on diabetes management.    In the past yes - last exam was 1-2/2021  History of Neuropathy: yes- tried lyrica but didn't tolerate due to dizziness.    History of Nephropathy: no     ASSOCIATED COMPLICATIONS:   HTN: yes  Hyperlipidemia: yes   Coronary Artery Disease: no   Cerebrovascular Disease: DAILY WITH MEALS., Disp: 54 mL, Rfl: 0  •  atorvastatin 40 MG Oral Tab, Take 1 tablet (40 mg total) by mouth nightly., Disp: 90 tablet, Rfl: 1  •  Losartan Potassium-HCTZ 50-12.5 MG Oral Tab, Take 1 tablet by mouth daily. , Disp: 90 tablet, Rfl: 1  •  Venla HOSSEIN U/F 32G X 4 MM Does not apply Misc, USE UTD, Disp: , Rfl: 11  •  Insulin Pen Needle (NOVOFINE) 32G X 6 MM Does not apply Misc, use with insulin pen as directed, Disp: , Rfl:     ALLERGIES:   No Known Allergies    SOCIAL HISTORY:   Social History    So normal pupils  Throat/Neck: normal sound to voice. Normal hearing, normal speech  Back: no kyphosis  Respiratory:  Speaking in full sentences, non-labored.  no increased work of breathing, no audible wheezing    Skin:  normal moisture and skin texture, no v -discussed to eliminate added sugars to diet   -instructed to eat dinner at least 2hrs before bedtime.      b) Nephropathy: GFR: 113  and urine MA: 48.6 on 6/10/2021   c) UTD with optho   d) Foot exam: normal today 9/1/2021  e) continue testing BG 3x patrick

## 2021-09-01 NOTE — PATIENT INSTRUCTIONS
A1C: 7.8% today --> unchanged from 7.8% on 6/10/2021  Blood glucose: 170 in clinic today    Medications:   -continue with Trulicity 9.7IE once weekly     - continue with Basaglar 80 units once daily    -continue with Admelog 20 units with breakfast      20 azúcar en abbi:  Mida hopson nivel de azúcar en abbi 3 veces al día  Momentos recomendados para realizar la prueba: antes del desayuno, antes del almuerzo y antes de la jayde.     Objetivos de azúcar en abbi:  Antes del desayuno:  (preferiblemente <1

## 2021-09-11 DIAGNOSIS — I10 ESSENTIAL HYPERTENSION: ICD-10-CM

## 2021-09-11 DIAGNOSIS — E11.65 UNCONTROLLED DIABETES MELLITUS WITH MICROALBUMINURIA, WITH LONG-TERM CURRENT USE OF INSULIN (HCC): ICD-10-CM

## 2021-09-11 DIAGNOSIS — R60.0 BILATERAL LEG EDEMA: ICD-10-CM

## 2021-09-11 DIAGNOSIS — E11.29 UNCONTROLLED DIABETES MELLITUS WITH MICROALBUMINURIA, WITH LONG-TERM CURRENT USE OF INSULIN (HCC): ICD-10-CM

## 2021-09-11 DIAGNOSIS — E78.5 HYPERLIPIDEMIA, UNSPECIFIED HYPERLIPIDEMIA TYPE: ICD-10-CM

## 2021-09-11 DIAGNOSIS — E78.2 MIXED HYPERLIPIDEMIA: ICD-10-CM

## 2021-09-11 DIAGNOSIS — Z79.4 UNCONTROLLED DIABETES MELLITUS WITH MICROALBUMINURIA, WITH LONG-TERM CURRENT USE OF INSULIN (HCC): ICD-10-CM

## 2021-09-11 DIAGNOSIS — R80.9 UNCONTROLLED DIABETES MELLITUS WITH MICROALBUMINURIA, WITH LONG-TERM CURRENT USE OF INSULIN (HCC): ICD-10-CM

## 2021-09-11 RX ORDER — LOSARTAN POTASSIUM AND HYDROCHLOROTHIAZIDE 12.5; 5 MG/1; MG/1
1 TABLET ORAL DAILY
Qty: 90 TABLET | Refills: 1 | Status: SHIPPED
Start: 2021-09-11 | End: 2021-12-11

## 2021-09-11 NOTE — TELEPHONE ENCOUNTER
Please review.  Protocol failed / No Protocol    Requested Prescriptions   Pending Prescriptions Disp Refills    ATORVASTATIN 40 MG Oral Tab [Pharmacy Med Name: ATORVASTATIN CALCIUM 40MG TABLET] 90 tablet 1     Sig: Take 1 tablet (40 mg total) by mouth lili Office Visit            Future Appointments         Provider Department Appt Notes    In 1 month Chris Elmore, [address] [place], [place] 2 month f/u

## 2021-09-12 RX ORDER — ATORVASTATIN CALCIUM 40 MG/1
40 TABLET, FILM COATED ORAL NIGHTLY
Qty: 90 TABLET | Refills: 1 | Status: SHIPPED | OUTPATIENT
Start: 2021-09-12

## 2021-09-13 RX ORDER — DULAGLUTIDE 4.5 MG/.5ML
INJECTION, SOLUTION SUBCUTANEOUS
Qty: 2 ML | Refills: 2 | Status: SHIPPED | OUTPATIENT
Start: 2021-09-13 | End: 2021-11-22

## 2021-09-27 RX ORDER — INSULIN LISPRO 100 U/ML
INJECTION, SOLUTION SUBCUTANEOUS
Qty: 54 ML | Refills: 0 | Status: SHIPPED | OUTPATIENT
Start: 2021-09-27 | End: 2021-12-07

## 2021-10-22 ENCOUNTER — HOSPITAL ENCOUNTER (OUTPATIENT)
Age: 46
Discharge: HOME | End: 2021-10-22
Payer: MEDICAID

## 2021-10-22 VITALS
WEIGHT: 210 LBS | HEIGHT: 61 IN | HEART RATE: 92 BPM | DIASTOLIC BLOOD PRESSURE: 71 MMHG | BODY MASS INDEX: 39.65 KG/M2 | TEMPERATURE: 97 F | OXYGEN SATURATION: 98 % | SYSTOLIC BLOOD PRESSURE: 128 MMHG | RESPIRATION RATE: 18 BRPM

## 2021-10-22 DIAGNOSIS — J02.0 STREP PHARYNGITIS: ICD-10-CM

## 2021-10-22 DIAGNOSIS — Z20.822 ENCOUNTER FOR LABORATORY TESTING FOR COVID-19 VIRUS: Primary | ICD-10-CM

## 2021-10-22 PROCEDURE — 87880 STREP A ASSAY W/OPTIC: CPT | Performed by: PHYSICIAN ASSISTANT

## 2021-10-22 PROCEDURE — 99213 OFFICE O/P EST LOW 20 MIN: CPT | Performed by: PHYSICIAN ASSISTANT

## 2021-10-22 PROCEDURE — U0002 COVID-19 LAB TEST NON-CDC: HCPCS | Performed by: PHYSICIAN ASSISTANT

## 2021-10-22 RX ORDER — AMOXICILLIN 500 MG/1
500 CAPSULE ORAL 2 TIMES DAILY
Qty: 14 CAPSULE | Refills: 0 | Status: SHIPPED | OUTPATIENT
Start: 2021-10-22 | End: 2021-10-29

## 2021-10-22 NOTE — ED PROVIDER NOTES
Patient Seen in: [facility]      History   Patient presents with:  Cough/URI  Covid-19 Test    Stated Complaint: Sore throat,cough,left ear pain    Subjective:   HPI    41 yo female with PMH as listed below here for COVID testing.   Pt reports SpO2 98 %   O2 Device None (Room air)       Current:/71   Pulse 92   Temp 97.2 °F (36.2 °C) (Temporal)   Resp 18   Ht 154.9 cm (5' 1\")   Wt 95.3 kg   SpO2 98%   BMI 39.68 kg/m²         Physical Exam  Vitals and nursing note reviewed.    Irish encounter diagnosis)  Strep pharyngitis     Disposition:  Discharge  10/22/2021 11:09 am    Follow-up:  Poornima Head DO  [address] [facility]  [phone]                Medications Prescribed:  Discharge Medication List as of

## 2021-11-01 RX ORDER — INSULIN GLARGINE 100 [IU]/ML
80 INJECTION, SOLUTION SUBCUTANEOUS DAILY
Qty: 75 ML | Refills: 1 | Status: SHIPPED | OUTPATIENT
Start: 2021-11-01 | End: 2021-12-08

## 2021-11-01 NOTE — TELEPHONE ENCOUNTER
LOV: 9/1/21    Future Appointments   Date Time Provider Annita Soriano   11/3/2021  8:45 AM Kerry Rivera, APRN [facility]

## 2021-11-03 ENCOUNTER — OFFICE VISIT (OUTPATIENT)
Dept: ENDOCRINOLOGY CLINIC | Facility: CLINIC | Age: 46
End: 2021-11-03
Payer: MEDICAID

## 2021-11-03 VITALS
BODY MASS INDEX: 40 KG/M2 | HEART RATE: 102 BPM | SYSTOLIC BLOOD PRESSURE: 121 MMHG | DIASTOLIC BLOOD PRESSURE: 81 MMHG | WEIGHT: 213 LBS

## 2021-11-03 DIAGNOSIS — Z79.4 TYPE 2 DIABETES MELLITUS WITH MICROALBUMINURIA, WITH LONG-TERM CURRENT USE OF INSULIN (HCC): Primary | ICD-10-CM

## 2021-11-03 DIAGNOSIS — E11.29 TYPE 2 DIABETES MELLITUS WITH MICROALBUMINURIA, WITH LONG-TERM CURRENT USE OF INSULIN (HCC): Primary | ICD-10-CM

## 2021-11-03 DIAGNOSIS — R80.9 TYPE 2 DIABETES MELLITUS WITH MICROALBUMINURIA, WITH LONG-TERM CURRENT USE OF INSULIN (HCC): Primary | ICD-10-CM

## 2021-11-03 PROCEDURE — 36416 COLLJ CAPILLARY BLOOD SPEC: CPT | Performed by: NURSE PRACTITIONER

## 2021-11-03 PROCEDURE — 3074F SYST BP LT 130 MM HG: CPT | Performed by: NURSE PRACTITIONER

## 2021-11-03 PROCEDURE — 82947 ASSAY GLUCOSE BLOOD QUANT: CPT | Performed by: NURSE PRACTITIONER

## 2021-11-03 PROCEDURE — 3079F DIAST BP 80-89 MM HG: CPT | Performed by: NURSE PRACTITIONER

## 2021-11-03 PROCEDURE — 83036 HEMOGLOBIN GLYCOSYLATED A1C: CPT | Performed by: NURSE PRACTITIONER

## 2021-11-03 PROCEDURE — 3051F HG A1C>EQUAL 7.0%<8.0%: CPT | Performed by: NURSE PRACTITIONER

## 2021-11-03 PROCEDURE — 99214 OFFICE O/P EST MOD 30 MIN: CPT | Performed by: NURSE PRACTITIONER

## 2021-11-03 NOTE — PATIENT INSTRUCTIONS
A1C: 7.0% today --> decrease from 7.8% on 9/1/2021  Blood glucose: 89 in clinic today    Medications:   -continue with Trulicity 1.1VY once weekly     - continue with Basaglar 80 units once daily    -Increase Admelog 20 units with breakfast      20 units w

## 2021-11-03 NOTE — PROGRESS NOTES
Name: Justyn Amos  Date: 11/3/2021    CHIEF COMPLAINT   Patient presents with:  Diabetes: Pt is here for a follow up. HISTORY OF PRESENT ILLNESS   Justyn Amos is a 42year old female who presents for follow up on diabetes management.    In the past 2 Nephropathy: no     ASSOCIATED COMPLICATIONS:   HTN: yes  Hyperlipidemia: yes   Coronary Artery Disease: no   Cerebrovascular Disease: no  Peripheral Vascular Disease: no      DIETARY COMPLIANCE:  Good; has been eating a low carbohydrate diet     EXERCISE: Venlafaxine HCl ER 75 MG Oral Capsule SR 24 Hr, Take 1 capsule (75 mg total) by mouth daily. , Disp: 90 capsule, Rfl: 0  •  Dulaglutide (TRULICITY) 3 DL/5.9XA Subcutaneous Solution Pen-injector, Inject 3 mg into the skin once a week for 30 days, THEN 4.5 m Vaping Use      Vaping Use: Never used    Substance and Sexual Activity      Alcohol use: No      Drug use: No      PAST MEDICAL HISTORY:   Past Medical History:   Diagnosis Date   • Amblyopia     OD   • Diabetes ([facility] [address]    • Hyperlipidemia    • Pregnancy 1 Pertinent labs reviewed    ASSESSMENT/PLAN:    -Reviewed with patient the pathogenesis of diabetes, clinical significance of A1c, and common complications such as: microvascular, macrovascular and diabetic ketoacidosis.  Patient verbalizes understanding of recommendations were discussed with the patient today. questions were also answered to the best of my knowledge. Patient verbalizes understanding of these issues and agrees to the plan.      11/3/2021  JOSS Hull

## 2021-11-22 RX ORDER — DULAGLUTIDE 4.5 MG/.5ML
INJECTION, SOLUTION SUBCUTANEOUS
Qty: 2 ML | Refills: 2 | Status: SHIPPED | OUTPATIENT
Start: 2021-11-22

## 2021-11-22 NOTE — TELEPHONE ENCOUNTER
LOV: 11/03/21 seen by APN    Future Appointments   Date Time Provider Annita Soriano   2/9/2022  8:00 AM JOSS Shields [facility]

## 2021-12-03 ENCOUNTER — IMMUNIZATION (OUTPATIENT)
Dept: FAMILY MEDICINE CLINIC | Facility: CLINIC | Age: 46
End: 2021-12-03
Payer: MEDICAID

## 2021-12-03 DIAGNOSIS — Z23 NEED FOR VACCINATION: Primary | ICD-10-CM

## 2021-12-03 PROCEDURE — 90471 IMMUNIZATION ADMIN: CPT | Performed by: NURSE PRACTITIONER

## 2021-12-03 PROCEDURE — 90686 IIV4 VACC NO PRSV 0.5 ML IM: CPT | Performed by: NURSE PRACTITIONER

## 2021-12-07 RX ORDER — INSULIN LISPRO 100 U/ML
INJECTION, SOLUTION SUBCUTANEOUS
Qty: 54 ML | Refills: 0 | Status: SHIPPED | OUTPATIENT
Start: 2021-12-07

## 2021-12-08 RX ORDER — INSULIN GLARGINE 100 [IU]/ML
80 INJECTION, SOLUTION SUBCUTANEOUS DAILY
Qty: 72 ML | Refills: 0 | Status: SHIPPED | OUTPATIENT
Start: 2021-12-08 | End: 2022-01-12

## 2021-12-09 DIAGNOSIS — F41.9 ANXIETY AND DEPRESSION: ICD-10-CM

## 2021-12-09 DIAGNOSIS — F32.A ANXIETY AND DEPRESSION: ICD-10-CM

## 2021-12-09 DIAGNOSIS — I10 ESSENTIAL HYPERTENSION: ICD-10-CM

## 2021-12-09 DIAGNOSIS — R60.0 BILATERAL LEG EDEMA: ICD-10-CM

## 2021-12-11 RX ORDER — VENLAFAXINE HYDROCHLORIDE 75 MG/1
CAPSULE, EXTENDED RELEASE ORAL
Qty: 90 CAPSULE | Refills: 0 | Status: SHIPPED | OUTPATIENT
Start: 2021-12-11

## 2021-12-11 RX ORDER — LOSARTAN POTASSIUM AND HYDROCHLOROTHIAZIDE 12.5; 5 MG/1; MG/1
1 TABLET ORAL DAILY
Qty: 90 TABLET | Refills: 1 | Status: SHIPPED | OUTPATIENT
Start: 2021-12-11 | End: 2022-12-06

## 2021-12-14 DIAGNOSIS — R14.0 ABDOMINAL BLOATING: ICD-10-CM

## 2021-12-14 DIAGNOSIS — K21.9 GASTROESOPHAGEAL REFLUX DISEASE: ICD-10-CM

## 2021-12-14 DIAGNOSIS — R10.13 EPIGASTRIC ABDOMINAL PAIN: ICD-10-CM

## 2021-12-14 RX ORDER — OMEPRAZOLE 40 MG/1
40 CAPSULE, DELAYED RELEASE ORAL DAILY
Qty: 90 CAPSULE | Refills: 3 | OUTPATIENT
Start: 2021-12-14 | End: 2022-12-09

## 2022-01-12 RX ORDER — INSULIN GLARGINE 100 [IU]/ML
80 INJECTION, SOLUTION SUBCUTANEOUS DAILY
Qty: 72 ML | Refills: 0 | Status: SHIPPED | OUTPATIENT
Start: 2022-01-12

## 2022-02-09 ENCOUNTER — OFFICE VISIT (OUTPATIENT)
Dept: ENDOCRINOLOGY CLINIC | Facility: CLINIC | Age: 47
End: 2022-02-09
Payer: MEDICAID

## 2022-02-09 VITALS
HEART RATE: 91 BPM | DIASTOLIC BLOOD PRESSURE: 79 MMHG | SYSTOLIC BLOOD PRESSURE: 121 MMHG | BODY MASS INDEX: 41 KG/M2 | WEIGHT: 215 LBS

## 2022-02-09 DIAGNOSIS — E11.65 UNCONTROLLED TYPE 2 DIABETES MELLITUS WITH HYPERGLYCEMIA, WITH LONG-TERM CURRENT USE OF INSULIN (HCC): Primary | ICD-10-CM

## 2022-02-09 DIAGNOSIS — Z79.4 UNCONTROLLED TYPE 2 DIABETES MELLITUS WITH HYPERGLYCEMIA, WITH LONG-TERM CURRENT USE OF INSULIN (HCC): Primary | ICD-10-CM

## 2022-02-09 LAB
CARTRIDGE LOT#: ABNORMAL NUMERIC
GLUCOSE BLOOD: 140
HEMOGLOBIN A1C: 7.7 % (ref 4.3–5.6)
TEST STRIP LOT #: NORMAL NUMERIC

## 2022-02-09 PROCEDURE — 36416 COLLJ CAPILLARY BLOOD SPEC: CPT | Performed by: NURSE PRACTITIONER

## 2022-02-09 PROCEDURE — 3074F SYST BP LT 130 MM HG: CPT | Performed by: NURSE PRACTITIONER

## 2022-02-09 PROCEDURE — 83036 HEMOGLOBIN GLYCOSYLATED A1C: CPT | Performed by: NURSE PRACTITIONER

## 2022-02-09 PROCEDURE — 3078F DIAST BP <80 MM HG: CPT | Performed by: NURSE PRACTITIONER

## 2022-02-09 PROCEDURE — 3051F HG A1C>EQUAL 7.0%<8.0%: CPT | Performed by: NURSE PRACTITIONER

## 2022-02-09 PROCEDURE — 82947 ASSAY GLUCOSE BLOOD QUANT: CPT | Performed by: NURSE PRACTITIONER

## 2022-02-09 PROCEDURE — 99214 OFFICE O/P EST MOD 30 MIN: CPT | Performed by: NURSE PRACTITIONER

## 2022-02-09 NOTE — PATIENT INSTRUCTIONS
A1C: 7.7% today --> increased from 7.0% on 11/3/2021  Blood glucose: 140 in clinic today    Medications:   -continue with Trulicity 3.0KO once weekly     - continue with Basaglar 80 units once daily    -continue with Admelog 20 units with breakfast      20 units with lunch      20 units with dinner      3 units with late night snack          -->If your glucose reading before breakfast is >140 --> increase to 5 units      Weight loss goal:  Wt Readings from Last 6 Encounters:  02/09/22 : 215 lb (97.5 kg)  11/03/21 : 213 lb (96.6 kg)  10/22/21 : 210 lb (95.3 kg)  09/01/21 : 213 lb (96.6 kg)  07/22/21 : 219 lb 9.6 oz (99.6 kg)  06/15/21 : 224 lb (101.6 kg)    A1C goal:   <7.0%    Blood sugar testing:  Test your blood sugar 3-4 times daily   Recommended times to test: Before breakfast and bedtime     Blood sugar targets:  Before breakfast:   (preferably < 110)  Before meals OR 2 hours after meals: <150     Call for persistent blood sugars < 75 or > 200.

## 2022-02-14 RX ORDER — DULAGLUTIDE 4.5 MG/.5ML
INJECTION, SOLUTION SUBCUTANEOUS
Qty: 2 ML | Refills: 2 | Status: SHIPPED | OUTPATIENT
Start: 2022-02-14

## 2022-02-15 ENCOUNTER — HOSPITAL ENCOUNTER (OUTPATIENT)
Age: 47
Discharge: HOME | End: 2022-02-15
Payer: MEDICAID

## 2022-02-15 VITALS
WEIGHT: 215 LBS | TEMPERATURE: 98 F | HEIGHT: 61 IN | HEART RATE: 97 BPM | BODY MASS INDEX: 40.59 KG/M2 | RESPIRATION RATE: 14 BRPM | DIASTOLIC BLOOD PRESSURE: 77 MMHG | SYSTOLIC BLOOD PRESSURE: 147 MMHG | OXYGEN SATURATION: 97 %

## 2022-02-15 DIAGNOSIS — N12 PYELONEPHRITIS: Primary | ICD-10-CM

## 2022-02-15 LAB
BILIRUB UR QL STRIP: NEGATIVE
GLUCOSE UR STRIP-MCNC: NEGATIVE MG/DL
KETONES UR STRIP-MCNC: NEGATIVE MG/DL
NITRITE UR QL STRIP: NEGATIVE
PH UR STRIP: 5.5 [PH]
PROT UR STRIP-MCNC: NEGATIVE MG/DL
UROBILINOGEN UR STRIP-ACNC: <2 MG/DL

## 2022-02-15 PROCEDURE — 99213 OFFICE O/P EST LOW 20 MIN: CPT | Performed by: NURSE PRACTITIONER

## 2022-02-15 PROCEDURE — 81002 URINALYSIS NONAUTO W/O SCOPE: CPT | Performed by: NURSE PRACTITIONER

## 2022-02-15 RX ORDER — CEFPODOXIME PROXETIL 200 MG/1
200 TABLET, FILM COATED ORAL 2 TIMES DAILY
Qty: 20 TABLET | Refills: 0 | Status: SHIPPED | OUTPATIENT
Start: 2022-02-15 | End: 2022-02-25

## 2022-02-15 NOTE — ED INITIAL ASSESSMENT (HPI)
Pt complaining of urinary symptoms for 3 weeks. +pain with urination and increase frequency. +lower back pain.  +chills. +nausea. Pt complaining of lower abd pain.

## 2022-02-21 RX ORDER — BLOOD SUGAR DIAGNOSTIC
STRIP MISCELLANEOUS
Qty: 400 STRIP | Refills: 0 | Status: SHIPPED | OUTPATIENT
Start: 2022-02-21

## 2022-02-21 RX ORDER — PEN NEEDLE, DIABETIC 32GX 5/32"
1 NEEDLE, DISPOSABLE MISCELLANEOUS 3 TIMES DAILY
Qty: 300 EACH | Refills: 2 | Status: SHIPPED | OUTPATIENT
Start: 2022-02-21

## 2022-03-07 RX ORDER — INSULIN LISPRO 100 [IU]/ML
INJECTION, SOLUTION INTRAVENOUS; SUBCUTANEOUS
Qty: 60 ML | Refills: 0 | Status: SHIPPED | OUTPATIENT
Start: 2022-03-07

## 2022-03-08 RX ORDER — ATORVASTATIN CALCIUM 40 MG/1
40 TABLET, FILM COATED ORAL NIGHTLY
Qty: 90 TABLET | Refills: 1 | Status: SHIPPED | OUTPATIENT
Start: 2022-03-08

## 2022-03-09 DIAGNOSIS — F32.A ANXIETY AND DEPRESSION: ICD-10-CM

## 2022-03-09 DIAGNOSIS — F41.9 ANXIETY AND DEPRESSION: ICD-10-CM

## 2022-03-10 RX ORDER — VENLAFAXINE HYDROCHLORIDE 75 MG/1
75 CAPSULE, EXTENDED RELEASE ORAL DAILY
Qty: 30 CAPSULE | Refills: 0 | Status: SHIPPED | OUTPATIENT
Start: 2022-03-10 | End: 2022-12-20

## 2022-03-10 NOTE — TELEPHONE ENCOUNTER
Please review. Protocol failed or has no protocol.     Requested Prescriptions   Pending Prescriptions Disp Refills    VENLAFAXINE ER 75 MG Oral Capsule SR 24 Hr [Pharmacy Med Name: VENLAFAXINE HCL ER 75 MG ORAL CAPSULE EXTENDED RELEASE 24 HOUR] 90 capsule 0     Sig: TAKE 1 CAPSULE (75 MG TOTAL) BY MOUTH DAILY IN THE MORNING FOR ANXIETY AND STRESS        Psychiatric Non-Scheduled (Anti-Anxiety) Failed - 3/9/2022  9:14 AM        Failed - Appointment in last 6 or next 3 months              Recent Outpatient Visits              4 weeks ago Uncontrolled type 2 diabetes mellitus with hyperglycemia, with long-term current use of insulin [facility])    [facility], [address], [facility]    Office Visit    4 months ago Type 2 diabetes mellitus with microalbuminuria, with long-term current use of insulin [facility])    [facility], [address], [address] [facility]    Office Visit    6 months ago Uncontrolled diabetes mellitus with microalbuminuria, with long-term current use of insulin [facility])    [facility], [address], [facility], [facility]    Office Visit    7 months ago Urination frequency    [facility], [address], [address], [place], [place]    Office Visit    8 months ago Uncontrolled diabetes mellitus with microalbuminuria, with long-term current use of insulin [facility])    [facility], [address], [address], [place], [place]    Office Visit            Future Appointments         Provider Department Appt Notes    In 1 month Magali Serrano, [address] [place] [address], [place], [place] follow up

## 2022-03-21 ENCOUNTER — OFFICE VISIT (OUTPATIENT)
Dept: FAMILY MEDICINE CLINIC | Facility: CLINIC | Age: 47
End: 2022-03-21
Payer: MEDICAID

## 2022-03-21 ENCOUNTER — TELEPHONE (OUTPATIENT)
Dept: FAMILY MEDICINE CLINIC | Facility: CLINIC | Age: 47
End: 2022-03-21

## 2022-03-21 VITALS
BODY MASS INDEX: 40.82 KG/M2 | WEIGHT: 216.19 LBS | TEMPERATURE: 97 F | HEART RATE: 97 BPM | SYSTOLIC BLOOD PRESSURE: 121 MMHG | HEIGHT: 61 IN | DIASTOLIC BLOOD PRESSURE: 78 MMHG

## 2022-03-21 DIAGNOSIS — K76.0 FATTY LIVER: ICD-10-CM

## 2022-03-21 DIAGNOSIS — R11.2 NAUSEA AND VOMITING, UNSPECIFIED VOMITING TYPE: ICD-10-CM

## 2022-03-21 DIAGNOSIS — I10 ESSENTIAL HYPERTENSION: ICD-10-CM

## 2022-03-21 DIAGNOSIS — E66.01 MORBID OBESITY DUE TO EXCESS CALORIES (HCC): ICD-10-CM

## 2022-03-21 DIAGNOSIS — Z12.31 VISIT FOR SCREENING MAMMOGRAM: ICD-10-CM

## 2022-03-21 DIAGNOSIS — R10.11 RUQ ABDOMINAL PAIN: Primary | ICD-10-CM

## 2022-03-21 PROCEDURE — 3074F SYST BP LT 130 MM HG: CPT | Performed by: FAMILY MEDICINE

## 2022-03-21 PROCEDURE — 3008F BODY MASS INDEX DOCD: CPT | Performed by: FAMILY MEDICINE

## 2022-03-21 PROCEDURE — 3078F DIAST BP <80 MM HG: CPT | Performed by: FAMILY MEDICINE

## 2022-03-21 PROCEDURE — 99214 OFFICE O/P EST MOD 30 MIN: CPT | Performed by: FAMILY MEDICINE

## 2022-03-21 RX ORDER — TELMISARTAN AND HYDROCHLORTHIAZIDE 40; 12.5 MG/1; MG/1
1 TABLET ORAL DAILY
Qty: 90 TABLET | Refills: 1 | Status: SHIPPED | OUTPATIENT
Start: 2022-03-21 | End: 2022-03-23

## 2022-03-21 NOTE — TELEPHONE ENCOUNTER
Issac Alberto from [address] [place] [address] Saul is calling to advise PCP the following medication was not received/authorized required.     telmisartan - hydrochlorothiazide    Please advise, call back #[phone]

## 2022-03-22 NOTE — TELEPHONE ENCOUNTER
Prior authorization for telmisartan-hydrochlorothiazide was done through [facility].  It can take 1-5 business days for a decision to come back

## 2022-03-23 RX ORDER — LOSARTAN POTASSIUM AND HYDROCHLOROTHIAZIDE 12.5; 5 MG/1; MG/1
1 TABLET ORAL DAILY
Qty: 90 TABLET | Refills: 1 | Status: SHIPPED | OUTPATIENT
Start: 2022-03-23 | End: 2023-03-18

## 2022-03-30 RX ORDER — INSULIN GLARGINE 100 [IU]/ML
80 INJECTION, SOLUTION SUBCUTANEOUS DAILY
Qty: 72 ML | Refills: 0 | Status: SHIPPED | OUTPATIENT
Start: 2022-03-30 | End: 2022-03-31

## 2022-03-31 RX ORDER — INSULIN GLARGINE 100 [IU]/ML
80 INJECTION, SOLUTION SUBCUTANEOUS DAILY
Qty: 72 ML | Refills: 0 | Status: SHIPPED | OUTPATIENT
Start: 2022-03-31

## 2022-04-27 ENCOUNTER — OFFICE VISIT (OUTPATIENT)
Dept: ENDOCRINOLOGY CLINIC | Facility: CLINIC | Age: 47
End: 2022-04-27
Payer: MEDICAID

## 2022-04-27 VITALS
SYSTOLIC BLOOD PRESSURE: 107 MMHG | BODY MASS INDEX: 40 KG/M2 | DIASTOLIC BLOOD PRESSURE: 70 MMHG | HEART RATE: 90 BPM | WEIGHT: 213.81 LBS

## 2022-04-27 DIAGNOSIS — E11.29 TYPE 2 DIABETES MELLITUS WITH MICROALBUMINURIA, WITH LONG-TERM CURRENT USE OF INSULIN (HCC): Primary | ICD-10-CM

## 2022-04-27 DIAGNOSIS — R80.9 TYPE 2 DIABETES MELLITUS WITH MICROALBUMINURIA, WITH LONG-TERM CURRENT USE OF INSULIN (HCC): Primary | ICD-10-CM

## 2022-04-27 DIAGNOSIS — Z79.4 TYPE 2 DIABETES MELLITUS WITH MICROALBUMINURIA, WITH LONG-TERM CURRENT USE OF INSULIN (HCC): Primary | ICD-10-CM

## 2022-04-27 LAB
CARTRIDGE LOT#: ABNORMAL NUMERIC
GLUCOSE BLOOD: 203
HEMOGLOBIN A1C: 7.9 % (ref 4.3–5.6)
TEST STRIP LOT #: NORMAL NUMERIC

## 2022-04-27 PROCEDURE — 3051F HG A1C>EQUAL 7.0%<8.0%: CPT | Performed by: NURSE PRACTITIONER

## 2022-04-27 PROCEDURE — 99214 OFFICE O/P EST MOD 30 MIN: CPT | Performed by: NURSE PRACTITIONER

## 2022-04-27 PROCEDURE — 3078F DIAST BP <80 MM HG: CPT | Performed by: NURSE PRACTITIONER

## 2022-04-27 PROCEDURE — 82947 ASSAY GLUCOSE BLOOD QUANT: CPT | Performed by: NURSE PRACTITIONER

## 2022-04-27 PROCEDURE — 3074F SYST BP LT 130 MM HG: CPT | Performed by: NURSE PRACTITIONER

## 2022-04-27 PROCEDURE — 83036 HEMOGLOBIN GLYCOSYLATED A1C: CPT | Performed by: NURSE PRACTITIONER

## 2022-04-27 NOTE — PATIENT INSTRUCTIONS
A1C: 7.9% today --> increased from 7.7% on 2/9/2022  Blood glucose: 203 in clinic today    Medications:   -continue with Trulicity 5.2YE once weekly     - continue with Basaglar 80 units once daily    -continue with Admelog 20 units with breakfast      20 units with lunch      20 units with dinner       5 units with late night snack     -increase water to 60-90oz per day   -please call with an update on your blood glucose readings in 2 weeks     Weight:  Wt Readings from Last 6 Encounters:  04/27/22 : 213 lb 12.8 oz (97 kg)  03/21/22 : 216 lb 3.2 oz (98.1 kg)  02/15/22 : 215 lb (97.5 kg)  02/09/22 : 215 lb (97.5 kg)  11/03/21 : 213 lb (96.6 kg)  10/22/21 : 210 lb (95.3 kg)    A1C goal:  <7.0%    Blood sugar testing:  Test your blood sugar 3 times daily   Recommended times to test: Before meals     Blood sugar targets:  Before breakfast:   (preferably < 110)  Before meals OR 2 hours after meals: <180 (preferably <150)     Call for persistent blood sugars < 75 or > 200.

## 2022-05-09 RX ORDER — DULAGLUTIDE 4.5 MG/.5ML
INJECTION, SOLUTION SUBCUTANEOUS
Qty: 2 ML | Refills: 2 | Status: SHIPPED | OUTPATIENT
Start: 2022-05-09

## 2022-05-09 RX ORDER — BLOOD SUGAR DIAGNOSTIC
STRIP MISCELLANEOUS
Qty: 400 STRIP | Refills: 0 | Status: SHIPPED | OUTPATIENT
Start: 2022-05-09

## 2022-05-17 ENCOUNTER — TELEPHONE (OUTPATIENT)
Dept: ENDOCRINOLOGY CLINIC | Facility: CLINIC | Age: 47
End: 2022-05-17

## 2022-05-17 NOTE — TELEPHONE ENCOUNTER
Patient dropped off sugar levels for provider to review per providers request. Copy of sugar levels placed in providers inbox in [address] [place] [address] for further review. Patient requesting a call once provider review results. Please advise.

## 2022-05-18 ENCOUNTER — TELEPHONE (OUTPATIENT)
Dept: ENDOCRINOLOGY CLINIC | Facility: CLINIC | Age: 47
End: 2022-05-18

## 2022-05-18 RX ORDER — SEMAGLUTIDE 2.68 MG/ML
2 INJECTION, SOLUTION SUBCUTANEOUS WEEKLY
Qty: 9 ML | Refills: 0 | Status: SHIPPED | OUTPATIENT
Start: 2022-05-18

## 2022-05-18 NOTE — TELEPHONE ENCOUNTER
BG readings reviewed and readings continue to be variable with fasting readings ranging between 110s - low 200s; before lunch readings in mid to upper 100s, before dinner mid to upper 100s and bedtime mid to low 200s. Would recommend that we transition to ozempic 2mg once weekly given lack of effectiveness with Trulicity. Rx sent to pharmacy. Please notify patient and start PA process. Thank you!

## 2022-05-18 NOTE — TELEPHONE ENCOUNTER
Spoke to patient via  Breana Olivares # [id number] to relay message below - patient stated understanding to stop Trulicity and start Ozempic 2mg/week  Patient denied further questions at this time

## 2022-05-18 NOTE — TELEPHONE ENCOUNTER
Medication PA Requested: Semaglutide, 2 MG/DOSE, (OZEMPIC, 2 MG/DOSE,) 8 MG/3ML Subcutaneous Solution Pen-injector                                                         CoverMyMeds Used:  Key:  Quantity: 9 mL  Day Supply: 84 days  Sig: Inject 2 mg into the skin once a week.   DX Code: E11.29, R80.9, Z79.4                     CPT code (if applicable):   Case Number/Pending Ref#:  Thanks

## 2022-05-19 RX ORDER — INSULIN LISPRO 100 [IU]/ML
INJECTION, SOLUTION INTRAVENOUS; SUBCUTANEOUS
Qty: 60 ML | Refills: 0 | Status: SHIPPED | OUTPATIENT
Start: 2022-05-19

## 2022-05-23 RX ORDER — INSULIN LISPRO 100 [IU]/ML
INJECTION, SOLUTION INTRAVENOUS; SUBCUTANEOUS
Qty: 60 ML | Refills: 0 | Status: SHIPPED | OUTPATIENT
Start: 2022-05-23

## 2022-05-23 NOTE — TELEPHONE ENCOUNTER
Medication PA Requested: Semaglutide, 2 MG/DOSE, (OZEMPIC, 2 MG/DOSE,) 8 MG/3ML Subcutaneous Solution Pen-injector                                                         CoverMyMeds Used:  Key:  Quantity: 9 mL  Day Supply: 84 days  Sig: Inject 2 mg into the skin once a week. DX Code: E11.29, R80.9, Z79.4      Faxed [employer] PA form with OV/A1c 4/274/2022  Awaiting determination.

## 2022-07-01 RX ORDER — INSULIN GLARGINE 100 [IU]/ML
80 INJECTION, SOLUTION SUBCUTANEOUS DAILY
Qty: 72 ML | Refills: 0 | Status: SHIPPED | OUTPATIENT
Start: 2022-07-01

## 2022-08-08 RX ORDER — DULAGLUTIDE 4.5 MG/.5ML
INJECTION, SOLUTION SUBCUTANEOUS
Qty: 2 ML | Refills: 2 | OUTPATIENT
Start: 2022-08-08

## 2022-08-08 NOTE — TELEPHONE ENCOUNTER
Per TE 7/85/25, Trulicity has been discontinued. Pt is now taking Rybelsus.  Please refuse if appropriate

## 2022-08-15 ENCOUNTER — HOSPITAL ENCOUNTER (OUTPATIENT)
Age: 47
Discharge: HOME | End: 2022-08-15
Payer: MEDICAID

## 2022-08-15 VITALS
HEART RATE: 90 BPM | RESPIRATION RATE: 18 BRPM | TEMPERATURE: 97 F | OXYGEN SATURATION: 99 % | SYSTOLIC BLOOD PRESSURE: 134 MMHG | DIASTOLIC BLOOD PRESSURE: 76 MMHG

## 2022-08-15 DIAGNOSIS — Z20.822 ENCOUNTER FOR LABORATORY TESTING FOR COVID-19 VIRUS: ICD-10-CM

## 2022-08-15 DIAGNOSIS — U07.1 COVID-19 VIRUS INFECTION: Primary | ICD-10-CM

## 2022-08-15 DIAGNOSIS — R07.0 THROAT PAIN IN ADULT: ICD-10-CM

## 2022-08-15 LAB — SARS-COV-2 RNA RESP QL NAA+PROBE: DETECTED

## 2022-08-15 PROCEDURE — 99213 OFFICE O/P EST LOW 20 MIN: CPT | Performed by: PHYSICIAN ASSISTANT

## 2022-08-15 PROCEDURE — U0002 COVID-19 LAB TEST NON-CDC: HCPCS | Performed by: PHYSICIAN ASSISTANT

## 2022-08-15 RX ORDER — LOSARTAN POTASSIUM 50 MG/1
TABLET ORAL
COMMUNITY
Start: 2022-03-24

## 2022-08-15 RX ORDER — HYDROCHLOROTHIAZIDE 12.5 MG/1
TABLET ORAL
COMMUNITY
Start: 2022-03-24

## 2022-08-15 RX ORDER — DULAGLUTIDE 4.5 MG/.5ML
4.5 INJECTION, SOLUTION SUBCUTANEOUS WEEKLY
COMMUNITY
Start: 2022-08-05

## 2022-08-22 RX ORDER — INSULIN LISPRO 100 [IU]/ML
INJECTION, SOLUTION INTRAVENOUS; SUBCUTANEOUS
Qty: 60 ML | Refills: 1 | Status: SHIPPED | OUTPATIENT
Start: 2022-08-22

## 2022-08-22 NOTE — TELEPHONE ENCOUNTER
Please follow up with patient regarding her BG readings and how she has been feeling since starting Rybelsus. Thank you!

## 2022-08-22 NOTE — TELEPHONE ENCOUNTER
LOV: 4/27/22    Future Appointments   Date Time Provider Annita Soriano   9/19/2022  9:15 AM Rosa Grandchild, APRN [facility]

## 2022-09-01 RX ORDER — DULAGLUTIDE 4.5 MG/.5ML
INJECTION, SOLUTION SUBCUTANEOUS
Qty: 2 ML | Refills: 0 | OUTPATIENT
Start: 2022-09-01

## 2022-09-01 RX ORDER — ORAL SEMAGLUTIDE 14 MG/1
TABLET ORAL
Qty: 90 TABLET | Refills: 0 | Status: SHIPPED | OUTPATIENT
Start: 2022-09-01

## 2022-09-01 NOTE — TELEPHONE ENCOUNTER
Nakia Michael -- please advise on refill request.  Please refer to 8/22/22 refill encounter. Thank you.

## 2022-09-01 NOTE — TELEPHONE ENCOUNTER
Called patient and states her BG were doing good after starting rybelsus until she was diagnosed with Covid mid August wherein her sugars were in the 200s. She was prescribed trulicity temporarily (per chart it's 4.5 mg) from urgent care per patient and took only 2 doses to manage her sugar. She then resumed her rybelsus 14 mg after that. Per patient her sugars this week had been ranging between 120-130, sugar this morning fasting was 126 and tolerating rybelsus well. She is following up with [facility] on 9/19/22. Endo RNs:  Please call patient once refill has been sent.

## 2022-09-01 NOTE — TELEPHONE ENCOUNTER
[facility] noted. Thank you for the update. Since patient is feeling well and BG well controlled, rx for rybelsus renewed. Thank you!

## 2022-09-03 DIAGNOSIS — E78.2 MIXED HYPERLIPIDEMIA: ICD-10-CM

## 2022-09-03 DIAGNOSIS — E78.5 HYPERLIPIDEMIA, UNSPECIFIED HYPERLIPIDEMIA TYPE: ICD-10-CM

## 2022-09-07 NOTE — TELEPHONE ENCOUNTER
Protocol failed or has No Protocol, please review  Requested Prescriptions   Pending Prescriptions Disp Refills    ATORVASTATIN 40 MG Oral Tab [Pharmacy Med Name: ATORVASTATIN CALCIUM 40 MG ORAL TABLET] 90 tablet 1     Sig: TAKE 1 TABLET (40 MG TOTAL) BY MOUTH NIGHTLY.         Cholesterol Medication Protocol Failed - 9/3/2022 10:31 AM        Failed - ALT in past 12 months        Failed - LDL in past 12 months        Failed - Last ALT < 80       Lab Results   Component Value Date    ALT 46 06/10/2021             Failed - Last LDL < 130     Lab Results   Component Value Date     (H) 06/10/2021               Passed - In person appointment or virtual visit in the past 12 mos or appointment in next 3 mos       Recent Outpatient Visits              4 months ago Type 2 diabetes mellitus with microalbuminuria, with long-term current use of insulin [facility])    [facility], [address], [address] [place] [address]    Office Visit    5 months ago RUQ abdominal pain    [facility], [address], [address], [place], [place]    Office Visit    7 months ago Uncontrolled type 2 diabetes mellitus with hyperglycemia, with long-term current use of insulin [facility])    [facility], [address], [address] [facility], [facility]    Office Visit    10 months ago Type 2 diabetes mellitus with microalbuminuria, with long-term current use of insulin [facility])    [facility], [address], [address] [place] [address], [facility]    Office Visit    1 year ago Uncontrolled diabetes mellitus with microalbuminuria, with long-term current use of insulin [facility])    [facility], [address], [address] [place] [address], [address]    Office Visit     Future Appointments         Provider Department Appt Notes    In 1 week Learta Pako, [address], [address], [place]                    Future Appointments         Provider Department Appt Notes    In 1 week Learta Pako, [address], [address], [place]           Recent Outpatient Visits              4 months ago Type 2 diabetes mellitus with microalbuminuria, with long-term current use of insulin [facility])    [address], [address] [facility], APRN    Office Visit    5 months ago RUQ abdominal pain    [address], [address], [place], [place]    Office Visit    7 months ago Uncontrolled type 2 diabetes mellitus with hyperglycemia, with long-term current use of insulin [facility])    [address], [address] [facility], APRN    Office Visit    10 months ago Type 2 diabetes mellitus with microalbuminuria, with long-term current use of insulin [facility])    [address], [address] [facility], [facility]    Office Visit    1 year ago Uncontrolled diabetes mellitus with microalbuminuria, with long-term current use of insulin [facility])    [address], [address] [facility], [facility], [address] [place] [address]    Office Visit

## 2022-09-08 RX ORDER — ATORVASTATIN CALCIUM 40 MG/1
40 TABLET, FILM COATED ORAL NIGHTLY
Qty: 90 TABLET | Refills: 0 | Status: SHIPPED | OUTPATIENT
Start: 2022-09-08

## 2022-09-19 ENCOUNTER — OFFICE VISIT (OUTPATIENT)
Dept: ENDOCRINOLOGY CLINIC | Facility: CLINIC | Age: 47
End: 2022-09-19

## 2022-09-19 VITALS
SYSTOLIC BLOOD PRESSURE: 122 MMHG | BODY MASS INDEX: 40 KG/M2 | DIASTOLIC BLOOD PRESSURE: 78 MMHG | WEIGHT: 211 LBS | HEART RATE: 89 BPM

## 2022-09-19 DIAGNOSIS — Z79.4 TYPE 2 DIABETES MELLITUS WITH MICROALBUMINURIA, WITH LONG-TERM CURRENT USE OF INSULIN (HCC): Primary | ICD-10-CM

## 2022-09-19 DIAGNOSIS — R80.9 TYPE 2 DIABETES MELLITUS WITH MICROALBUMINURIA, WITH LONG-TERM CURRENT USE OF INSULIN (HCC): Primary | ICD-10-CM

## 2022-09-19 DIAGNOSIS — E11.29 TYPE 2 DIABETES MELLITUS WITH MICROALBUMINURIA, WITH LONG-TERM CURRENT USE OF INSULIN (HCC): Primary | ICD-10-CM

## 2022-09-19 LAB
CARTRIDGE LOT#: NORMAL NUMERIC
GLUCOSE BLOOD: 117
TEST STRIP LOT #: NORMAL NUMERIC

## 2022-09-19 PROCEDURE — 3074F SYST BP LT 130 MM HG: CPT | Performed by: NURSE PRACTITIONER

## 2022-09-19 PROCEDURE — 83036 HEMOGLOBIN GLYCOSYLATED A1C: CPT | Performed by: NURSE PRACTITIONER

## 2022-09-19 PROCEDURE — 99214 OFFICE O/P EST MOD 30 MIN: CPT | Performed by: NURSE PRACTITIONER

## 2022-09-19 PROCEDURE — 3044F HG A1C LEVEL LT 7.0%: CPT | Performed by: NURSE PRACTITIONER

## 2022-09-19 PROCEDURE — 3078F DIAST BP <80 MM HG: CPT | Performed by: NURSE PRACTITIONER

## 2022-09-19 PROCEDURE — 82947 ASSAY GLUCOSE BLOOD QUANT: CPT | Performed by: NURSE PRACTITIONER

## 2022-09-19 NOTE — PATIENT INSTRUCTIONS
A1C: 7.3% today -->decreased from 7.9% on 4/27/2022  Blood glucose: 117 in clinic today    Medications:   - continue with Rybelsus 14mg once daily in AM   continue with Basaglar 80 units once daily   -continue with Admelog 20 units with breakfast      20 units with lunch      20 units with dinner    Weight:  Wt Readings from Last 6 Encounters:  09/19/22 : 211 lb (95.7 kg)  04/27/22 : 213 lb 12.8 oz (97 kg)  03/21/22 : 216 lb 3.2 oz (98.1 kg)  02/15/22 : 215 lb (97.5 kg)  02/09/22 : 215 lb (97.5 kg)  11/03/21 : 213 lb (96.6 kg)    A1C goal:  <7.0%    Blood sugar testing:  Test your blood sugar 3 times daily   Recommended times to test: Before breakfast (fasting), before lunch and before dinner     Blood sugar targets:  Before breakfast:   (preferably < 110)  Before meals: <150    Call for persistent blood sugars < 75 or > 200

## 2022-10-26 RX ORDER — INSULIN GLARGINE 100 [IU]/ML
80 INJECTION, SOLUTION SUBCUTANEOUS DAILY
Qty: 72 ML | Refills: 0 | Status: SHIPPED | OUTPATIENT
Start: 2022-10-26

## 2022-11-27 RX ORDER — ORAL SEMAGLUTIDE 14 MG/1
TABLET ORAL
Qty: 90 TABLET | Refills: 0 | Status: SHIPPED | OUTPATIENT
Start: 2022-11-27

## 2022-12-14 DIAGNOSIS — R60.0 BILATERAL LEG EDEMA: ICD-10-CM

## 2022-12-14 DIAGNOSIS — I10 ESSENTIAL HYPERTENSION: ICD-10-CM

## 2022-12-15 RX ORDER — LOSARTAN POTASSIUM AND HYDROCHLOROTHIAZIDE 12.5; 5 MG/1; MG/1
1 TABLET ORAL DAILY
Qty: 30 TABLET | Refills: 0 | Status: SHIPPED | OUTPATIENT
Start: 2022-12-15 | End: 2023-12-10

## 2022-12-19 ENCOUNTER — OFFICE VISIT (OUTPATIENT)
Dept: ENDOCRINOLOGY CLINIC | Facility: CLINIC | Age: 47
End: 2022-12-19
Payer: MEDICAID

## 2022-12-19 ENCOUNTER — TELEPHONE (OUTPATIENT)
Dept: ENDOCRINOLOGY CLINIC | Facility: CLINIC | Age: 47
End: 2022-12-19

## 2022-12-19 VITALS — BODY MASS INDEX: 41 KG/M2 | WEIGHT: 217.88 LBS | SYSTOLIC BLOOD PRESSURE: 134 MMHG | DIASTOLIC BLOOD PRESSURE: 83 MMHG

## 2022-12-19 DIAGNOSIS — Z79.4 UNCONTROLLED TYPE 2 DIABETES MELLITUS WITH HYPERGLYCEMIA, WITH LONG-TERM CURRENT USE OF INSULIN (HCC): Primary | ICD-10-CM

## 2022-12-19 DIAGNOSIS — E11.65 UNCONTROLLED TYPE 2 DIABETES MELLITUS WITH HYPERGLYCEMIA, WITH LONG-TERM CURRENT USE OF INSULIN (HCC): Primary | ICD-10-CM

## 2022-12-19 LAB
CARTRIDGE LOT#: ABNORMAL NUMERIC
GLUCOSE BLOOD: 156
HEMOGLOBIN A1C: 8 % (ref 4.3–5.6)
TEST STRIP LOT #: NORMAL NUMERIC

## 2022-12-19 PROCEDURE — 3079F DIAST BP 80-89 MM HG: CPT | Performed by: NURSE PRACTITIONER

## 2022-12-19 PROCEDURE — 3075F SYST BP GE 130 - 139MM HG: CPT | Performed by: NURSE PRACTITIONER

## 2022-12-19 PROCEDURE — 3052F HG A1C>EQUAL 8.0%<EQUAL 9.0%: CPT | Performed by: NURSE PRACTITIONER

## 2022-12-19 PROCEDURE — 99214 OFFICE O/P EST MOD 30 MIN: CPT | Performed by: NURSE PRACTITIONER

## 2022-12-19 PROCEDURE — 82947 ASSAY GLUCOSE BLOOD QUANT: CPT | Performed by: NURSE PRACTITIONER

## 2022-12-19 PROCEDURE — 83036 HEMOGLOBIN GLYCOSYLATED A1C: CPT | Performed by: NURSE PRACTITIONER

## 2022-12-19 RX ORDER — BLOOD-GLUCOSE SENSOR
EACH MISCELLANEOUS
Qty: 3 EACH | Refills: 2 | Status: SHIPPED | OUTPATIENT
Start: 2022-12-19

## 2022-12-19 RX ORDER — INSULIN DETEMIR 100 [IU]/ML
80 INJECTION, SOLUTION SUBCUTANEOUS NIGHTLY
Qty: 72 ML | Refills: 0 | Status: SHIPPED | OUTPATIENT
Start: 2022-12-19 | End: 2023-03-19

## 2022-12-19 RX ORDER — BLOOD-GLUCOSE,RECEIVER,CONT
1 EACH MISCELLANEOUS DAILY
Qty: 1 EACH | Refills: 0 | Status: SHIPPED | OUTPATIENT
Start: 2022-12-19

## 2022-12-19 RX ORDER — BLOOD-GLUCOSE TRANSMITTER
EACH MISCELLANEOUS
Qty: 1 EACH | Refills: 0 | Status: SHIPPED | OUTPATIENT
Start: 2022-12-19

## 2022-12-19 RX ORDER — SEMAGLUTIDE 1.34 MG/ML
1 INJECTION, SOLUTION SUBCUTANEOUS WEEKLY
Qty: 9 ML | Refills: 0 | Status: SHIPPED | OUTPATIENT
Start: 2022-12-19

## 2022-12-19 NOTE — PATIENT INSTRUCTIONS
A1C: 8.0% today -->increased from 7.3% on 9/19/2022  Blood glucose: 156 in clinic today    Medications:   -stop Rybelsus  -start ozempic 1mg once weekly   -stop Basaglar   -start Levemir 80 units once nightly at 7pm   -continue with Admelog 20 units with breakfast          20 units with lunch          20 units with dinner    Weight:  Wt Readings from Last 6 Encounters:  12/19/22 : 217 lb 14.4 oz (98.8 kg)  09/19/22 : 211 lb (95.7 kg)  04/27/22 : 213 lb 12.8 oz (97 kg)  03/21/22 : 216 lb 3.2 oz (98.1 kg)  02/15/22 : 215 lb (97.5 kg)  02/09/22 : 215 lb (97.5 kg)    A1C goal:  <7.0%    Blood sugar testing:  Start Dexcom G6 continuous glucometer     Blood sugar targets:  Before breakfast:   (preferably < 110)  Before meals OR 2 hours after meals: <150    Call for persistent blood sugars < 75 or > 200

## 2022-12-19 NOTE — TELEPHONE ENCOUNTER
Please start PA for Dexcom G6 and ozempic. Patient has tried and failed therapies with trulicity, victoza and rybelsus. Thank you!

## 2022-12-19 NOTE — TELEPHONE ENCOUNTER
Medication PA Requested: Dexcom G6, Sensors, , Transmitter                                                          CoverMyMeds Used:  Key:  Quantity:  Day Supply: Sensors: 9, Transmitter: 1, : 1  Sig: Replace sensors every 10 days, replace transmitter every 3 months, use  as directed   DX Code: E11.65, Z79.4                                    CPT code (if applicable):   Case Number/Pending Ref#:

## 2022-12-19 NOTE — TELEPHONE ENCOUNTER
Ozempic PA done via epic    semaglutide (OZEMPIC, 1 MG/DOSE,) 4 MG/3ML Subcutaneous Solution Pen-injector 9 mL 0 12/19/2022     Sig - Route: Inject 1 mg into the skin once a week. - Subcutaneous    Prior authorization: Waiting for Payer Response    This order has not been released to its destination. Will start PA for Dexcom supplies.

## 2022-12-20 ENCOUNTER — OFFICE VISIT (OUTPATIENT)
Dept: FAMILY MEDICINE CLINIC | Facility: CLINIC | Age: 47
End: 2022-12-20
Payer: MEDICAID

## 2022-12-20 VITALS
HEIGHT: 62 IN | BODY MASS INDEX: 40.12 KG/M2 | DIASTOLIC BLOOD PRESSURE: 68 MMHG | TEMPERATURE: 97 F | SYSTOLIC BLOOD PRESSURE: 120 MMHG | WEIGHT: 218 LBS | HEART RATE: 90 BPM

## 2022-12-20 DIAGNOSIS — Z79.4 UNCONTROLLED DIABETES MELLITUS WITH HYPERGLYCEMIA, WITH LONG-TERM CURRENT USE OF INSULIN (HCC): ICD-10-CM

## 2022-12-20 DIAGNOSIS — Z12.11 SCREENING FOR COLON CANCER: ICD-10-CM

## 2022-12-20 DIAGNOSIS — Z71.0 COUNSELING ON BEHALF OF ANOTHER: ICD-10-CM

## 2022-12-20 DIAGNOSIS — Z12.31 VISIT FOR SCREENING MAMMOGRAM: ICD-10-CM

## 2022-12-20 DIAGNOSIS — G24.5 BLEPHAROSPASM OF BOTH EYES: ICD-10-CM

## 2022-12-20 DIAGNOSIS — R20.0 NUMBNESS OF LEFT HAND: ICD-10-CM

## 2022-12-20 DIAGNOSIS — E11.65 UNCONTROLLED DIABETES MELLITUS WITH HYPERGLYCEMIA, WITH LONG-TERM CURRENT USE OF INSULIN (HCC): ICD-10-CM

## 2022-12-20 DIAGNOSIS — Z23 NEED FOR VACCINATION: ICD-10-CM

## 2022-12-20 DIAGNOSIS — I10 ESSENTIAL HYPERTENSION: Primary | ICD-10-CM

## 2022-12-20 PROCEDURE — 90471 IMMUNIZATION ADMIN: CPT | Performed by: FAMILY MEDICINE

## 2022-12-20 PROCEDURE — 90472 IMMUNIZATION ADMIN EACH ADD: CPT | Performed by: FAMILY MEDICINE

## 2022-12-20 PROCEDURE — 90677 PCV20 VACCINE IM: CPT | Performed by: FAMILY MEDICINE

## 2022-12-20 PROCEDURE — 90686 IIV4 VACC NO PRSV 0.5 ML IM: CPT | Performed by: FAMILY MEDICINE

## 2022-12-20 PROCEDURE — 3074F SYST BP LT 130 MM HG: CPT | Performed by: FAMILY MEDICINE

## 2022-12-20 PROCEDURE — 99214 OFFICE O/P EST MOD 30 MIN: CPT | Performed by: FAMILY MEDICINE

## 2022-12-20 PROCEDURE — 3008F BODY MASS INDEX DOCD: CPT | Performed by: FAMILY MEDICINE

## 2022-12-20 PROCEDURE — 3078F DIAST BP <80 MM HG: CPT | Performed by: FAMILY MEDICINE

## 2022-12-21 NOTE — TELEPHONE ENCOUNTER
Medication PA Requested: Dexcom G6, Sensors, , Transmitter                                                          CoverMyMeds Used:  Key:  Quantity:  Day Supply: Sensors: 9, Transmitter: 1, : 1  Sig: Replace sensors every 10 days, replace transmitter every 3 months, use  as directed   DX Code: E11.65, Z79.4           Faxed [employer] pa form with LOV 12/19 & a1c  Awaiting determination.

## 2022-12-27 NOTE — TELEPHONE ENCOUNTER
Called [employer] [phone], spoke with [facility]. She states pa for Dexcom CGM was approved from 9/22/2022-12/21/2023, auth # [id number]. Call ref # [facility] 12/27/2022    [address] [place] [address], [phone], spoke with  Chayo Pennington, states went through. Endo staff please notify patient of approval-no my chart account. Thank you!

## 2022-12-28 NOTE — TELEPHONE ENCOUNTER
Called patient, verbalized understanding and acknowledged regarding approval of Dexcom. RN advised patient to follow up with pharmacy, patient agrees with plan. No further questions.

## 2023-01-10 RX ORDER — BLOOD SUGAR DIAGNOSTIC
STRIP MISCELLANEOUS
Qty: 400 STRIP | Refills: 0 | Status: SHIPPED | OUTPATIENT
Start: 2023-01-10

## 2023-02-13 RX ORDER — SEMAGLUTIDE 1.34 MG/ML
1 INJECTION, SOLUTION SUBCUTANEOUS WEEKLY
Qty: 9 ML | Refills: 0 | Status: SHIPPED | OUTPATIENT
Start: 2023-02-13

## 2023-02-13 NOTE — TELEPHONE ENCOUNTER
LOV: 12/19/22    RTC:3months    F/U:03/20/23     Pending Monthly Supply:order pending, approve if appropriate.

## 2023-02-15 DIAGNOSIS — R60.0 BILATERAL LEG EDEMA: ICD-10-CM

## 2023-02-15 DIAGNOSIS — I10 ESSENTIAL HYPERTENSION: ICD-10-CM

## 2023-02-16 NOTE — TELEPHONE ENCOUNTER
Please review. Protocol Failed or has no protocol   Requested Prescriptions   Pending Prescriptions Disp Refills    LOSARTAN-HYDROCHLOROTHIAZIDE 50-12.5 MG Oral Tab [Pharmacy Med Name: LOSARTAN POTASSIUM-HCTZ 50-12.5 MG ORAL TABLET] 30 tablet 0     Sig: TAKE 1 TABLET BY MOUTH DAILY. Hypertensive Medications Protocol Failed - 2/15/2023 11:57 AM        Failed - CMP or BMP in past 6 months     No results found for this or any previous visit (from the past 4392 hour(s)).             Failed - EGFRCR or GFRNAA > 50     GFR Evaluation            Passed - In person appointment in the past 12 or next 3 months     Recent Outpatient Visits              1 month ago Essential hypertension    [address], [address], [address], [place],     Office Visit    1 month ago Uncontrolled type 2 diabetes mellitus with hyperglycemia, with long-term current use of insulin ([facility]    [address], [address], [facility], [facility]    Office Visit    5 months ago Type 2 diabetes mellitus with microalbuminuria, with long-term current use of insulin ([facility]    [address], [address], [facility], APR    Office Visit    9 months ago Type 2 diabetes mellitus with microalbuminuria, with long-term current use of insulin ([facility]    [address], [address], [address] [facility], [facility]    Office Visit    11 months ago RUQ abdominal pain    [facility], [address], [address], [place], [place]    Office Visit          Future Appointments         Provider Department Appt Notes    In 1 month Marylynn Duverney, [address], [place] 3 mnth                Passed - Last BP reading less than 140/90     BP Readings from Last 1 Encounters:  12/20/22 : 120/68              Passed - In person appointment or virtual visit in the past 6 months     Recent Outpatient Visits              1 month ago Essential hypertension    [facility], [address], [place], DO    Office Visit    1 month ago Uncontrolled type 2 diabetes mellitus with hyperglycemia, with long-term current use of insulin ([address]    [address], [place] Elena Cisneros, APRN    Office Visit    5 months ago Type 2 diabetes mellitus with microalbuminuria, with long-term current use of insulin ([address]    [address], [place] Gutierrez Malcolm, APRN    Office Visit    9 months ago Type 2 diabetes mellitus with microalbuminuria, with long-term current use of insulin ([address]    [address], [place] H[place], APRN    Office Visit    11 months ago RUQ abdominal pain    [facility], [address], [address], [place], DO    Office Visit          Future Appointments         Provider Department Appt Notes    In 1 month Early Juliano, J[address], [address] Visits              1 month ago Essential hypertension    [address], [address], [place], DO    Office Visit    1 month ago Uncontrolled type 2 diabetes mellitus with hyperglycemia, with long-term current use of insulin ([address]    [address], [place] Elena Cisneros, APRN    Office Visit    5 months ago Type 2 diabetes mellitus with microalbuminuria, with long-term current use of insulin [facility])    [address], [place] Elena Cisneros, APRN    Office Visit    9 months ago Type 2 diabetes mellitus with microalbuminuria, with long-term current use of insulin [facility])    [address], [place] Hena[place], APRN    Office Visit    11 months ago RUQ abdominal pain    [facility], [address], [place], DO    Office Visit          Future Appointments Provider Department Appt Notes    In 1 month [address], [address], [place] 3 mnth

## 2023-02-17 RX ORDER — LOSARTAN POTASSIUM AND HYDROCHLOROTHIAZIDE 12.5; 5 MG/1; MG/1
1 TABLET ORAL DAILY
Qty: 90 TABLET | Refills: 1 | Status: SHIPPED | OUTPATIENT
Start: 2023-02-17

## 2023-03-02 RX ORDER — INSULIN DETEMIR 100 [IU]/ML
80 INJECTION, SOLUTION SUBCUTANEOUS NIGHTLY
Qty: 72 ML | Refills: 0 | Status: SHIPPED | OUTPATIENT
Start: 2023-03-02 | End: 2023-05-31

## 2023-03-11 DIAGNOSIS — E78.5 HYPERLIPIDEMIA, UNSPECIFIED HYPERLIPIDEMIA TYPE: ICD-10-CM

## 2023-03-11 DIAGNOSIS — E78.2 MIXED HYPERLIPIDEMIA: ICD-10-CM

## 2023-03-13 RX ORDER — BLOOD-GLUCOSE SENSOR
EACH MISCELLANEOUS
Qty: 3 EACH | Refills: 2 | Status: SHIPPED | OUTPATIENT
Start: 2023-03-13

## 2023-03-14 RX ORDER — ATORVASTATIN CALCIUM 40 MG/1
40 TABLET, FILM COATED ORAL NIGHTLY
Qty: 90 TABLET | Refills: 0 | Status: SHIPPED | OUTPATIENT
Start: 2023-03-14

## 2023-03-18 RX ORDER — INSULIN LISPRO 100 [IU]/ML
INJECTION, SOLUTION INTRAVENOUS; SUBCUTANEOUS
Qty: 60 ML | Refills: 1 | Status: SHIPPED | OUTPATIENT
Start: 2023-03-18

## 2023-03-20 ENCOUNTER — OFFICE VISIT (OUTPATIENT)
Dept: ENDOCRINOLOGY CLINIC | Facility: CLINIC | Age: 48
End: 2023-03-20

## 2023-03-20 VITALS
BODY MASS INDEX: 40 KG/M2 | DIASTOLIC BLOOD PRESSURE: 81 MMHG | WEIGHT: 216 LBS | HEART RATE: 99 BPM | SYSTOLIC BLOOD PRESSURE: 135 MMHG

## 2023-03-20 DIAGNOSIS — E11.65 TYPE 2 DIABETES MELLITUS WITH HYPERGLYCEMIA, WITH LONG-TERM CURRENT USE OF INSULIN (HCC): Primary | ICD-10-CM

## 2023-03-20 DIAGNOSIS — Z79.4 TYPE 2 DIABETES MELLITUS WITH HYPERGLYCEMIA, WITH LONG-TERM CURRENT USE OF INSULIN (HCC): Primary | ICD-10-CM

## 2023-03-20 LAB
CARTRIDGE LOT#: ABNORMAL NUMERIC
GLUCOSE BLOOD: 183
HEMOGLOBIN A1C: 7.3 % (ref 4.3–5.6)
TEST STRIP LOT #: NORMAL NUMERIC

## 2023-03-20 PROCEDURE — 95251 CONT GLUC MNTR ANALYSIS I&R: CPT | Performed by: NURSE PRACTITIONER

## 2023-03-20 PROCEDURE — 3075F SYST BP GE 130 - 139MM HG: CPT | Performed by: NURSE PRACTITIONER

## 2023-03-20 PROCEDURE — 3051F HG A1C>EQUAL 7.0%<8.0%: CPT | Performed by: NURSE PRACTITIONER

## 2023-03-20 PROCEDURE — 82947 ASSAY GLUCOSE BLOOD QUANT: CPT | Performed by: NURSE PRACTITIONER

## 2023-03-20 PROCEDURE — 3079F DIAST BP 80-89 MM HG: CPT | Performed by: NURSE PRACTITIONER

## 2023-03-20 PROCEDURE — 99214 OFFICE O/P EST MOD 30 MIN: CPT | Performed by: NURSE PRACTITIONER

## 2023-03-20 PROCEDURE — 83036 HEMOGLOBIN GLYCOSYLATED A1C: CPT | Performed by: NURSE PRACTITIONER

## 2023-03-20 RX ORDER — INSULIN ASPART INJECTION 100 [IU]/ML
INJECTION, SOLUTION SUBCUTANEOUS
Qty: 60 ML | Refills: 1 | Status: SHIPPED | OUTPATIENT
Start: 2023-03-20

## 2023-03-20 RX ORDER — SEMAGLUTIDE 2.68 MG/ML
2 INJECTION, SOLUTION SUBCUTANEOUS WEEKLY
Qty: 9 ML | Refills: 0 | Status: SHIPPED | OUTPATIENT
Start: 2023-03-20

## 2023-03-21 RX ORDER — PROCHLORPERAZINE 25 MG/1
SUPPOSITORY RECTAL
Qty: 1 EACH | Refills: 0 | Status: SHIPPED | OUTPATIENT
Start: 2023-03-21

## 2023-03-21 NOTE — TELEPHONE ENCOUNTER
Current Outpatient Prescriptions:  Insulin Degludec (TRESIBA FLEXTOUCH) 200 UNIT/ML Subcutaneous Solution Pen-injector Inject 80 Units into the skin daily. Disp: 30 mL Rfl: 2     Per pharmacy med not covered pls consider  Alt. No risk alerts present

## 2023-03-21 NOTE — TELEPHONE ENCOUNTER
LOV; 03/20/23    RTC;3months    F/U;06/21/23     Pending Monthly Supply; order pending, approve if appropriate.

## 2023-04-20 ENCOUNTER — TELEPHONE (OUTPATIENT)
Dept: ENDOCRINOLOGY CLINIC | Facility: CLINIC | Age: 48
End: 2023-04-20

## 2023-04-20 NOTE — TELEPHONE ENCOUNTER
Patient was seen in the office in [facility] to see JOSS Armijo and was told medication for Ozempic   Please advs

## 2023-04-24 RX ORDER — INSULIN ASPART INJECTION 100 [IU]/ML
INJECTION, SOLUTION SUBCUTANEOUS
Qty: 60 ML | Refills: 1 | Status: SHIPPED | OUTPATIENT
Start: 2023-04-24

## 2023-04-24 NOTE — TELEPHONE ENCOUNTER
Dayo Barrera [id number]    Patient came into [facility] clinic to ask question    Murtaza Garber dc--start Marcio Penn, patient states pharmacy says not ready for      She went Saturday, and not ready for , RN advised to patient to follow up     Patient states pharmacy did not received Rx. Will resend Rx and patient will follow up today.      Confirmed Rx needed was [employer]

## 2023-05-15 RX ORDER — SEMAGLUTIDE 2.68 MG/ML
2 INJECTION, SOLUTION SUBCUTANEOUS WEEKLY
Qty: 9 ML | Refills: 0 | Status: SHIPPED | OUTPATIENT
Start: 2023-05-15

## 2023-05-15 RX ORDER — PROCHLORPERAZINE 25 MG/1
SUPPOSITORY RECTAL
Qty: 3 EACH | Refills: 2 | Status: SHIPPED | OUTPATIENT
Start: 2023-05-15

## 2023-05-18 RX ORDER — INSULIN DETEMIR 100 [IU]/ML
INJECTION, SOLUTION SUBCUTANEOUS
Qty: 72 ML | Refills: 0 | Status: SHIPPED | OUTPATIENT
Start: 2023-05-18

## 2023-06-07 DIAGNOSIS — I10 ESSENTIAL HYPERTENSION: ICD-10-CM

## 2023-06-07 DIAGNOSIS — R60.0 BILATERAL LEG EDEMA: ICD-10-CM

## 2023-06-08 NOTE — TELEPHONE ENCOUNTER
Please review. Protocol failed / Has no protocol. Requested Prescriptions   Pending Prescriptions Disp Refills    LOSARTAN-HYDROCHLOROTHIAZIDE 50-12.5 MG Oral Tab [Pharmacy Med Name: LOSARTAN POTASSIUM-HCTZ 50-12.5 MG ORAL TABLET] 90 tablet 1     Sig: Take 1 tablet by mouth daily. Hypertensive Medications Protocol Failed - 6/7/2023  2:46 PM        Failed - CMP or BMP in past 6 months     No results found for this or any previous visit (from the past 4392 hour(s)).               Failed - EGFRCR or GFRNAA > 50     GFR Evaluation              Passed - In person appointment in the past 12 or next 3 months     Recent Outpatient Visits              2 months ago Type 2 diabetes mellitus with hyperglycemia, with long-term current use of insulin ([facility]    [address], [address], [address] [facility]    Office Visit    5 months ago Essential hypertension    [address], [address], [address], [place],     Office Visit    5 months ago Uncontrolled type 2 diabetes mellitus with hyperglycemia, with long-term current use of insulin ([facility]    [address], [address], [facility]    Office Visit    8 months ago Type 2 diabetes mellitus with microalbuminuria, with long-term current use of insulin ([facility]    [address], [address], [place] [facility]    Office Visit    1 year ago Type 2 diabetes mellitus with microalbuminuria, with long-term current use of insulin ([facility]    [address], [address], [address] [facility]    Office Visit          Future Appointments         Provider Department Appt Notes    In 1 week JOSS Bragg Conception [place] Pettit     In 1 month [facility] [address], [address], [place] follow up on meds/policy informed to pt               Passed - Last BP reading less than 140/90     BP Readings from Last 1 Encounters:  03/20/23 : 135/81                Passed - In person appointment or virtual visit in the past 6 months     Recent Outpatient Visits              2 months ago Type 2 diabetes mellitus with hyperglycemia, with long-term current use of insulin ([address]    [address], [address], [address], [address] [facility], APRN    Office Visit    5 months ago Essential hypertension    [place],     Office Visit    5 months ago Uncontrolled type 2 diabetes mellitus with hyperglycemia, with long-term current use of insulin ([address]    [address], [address], [place], [address] [facility], APRN    Office Visit    8 months ago Type 2 diabetes mellitus with microalbuminuria, with long-term current use of insulin ([address]    [address], [address], [place], [address] [facility], APRN    Office Visit    1 year ago Type 2 diabetes mellitus with microalbuminuria, with long-term current use of insulin ([address]    [address], [address], [address], [address] [facility], APRN    Office Visit          Future Appointments         Provider Department Appt Notes    In 1 week JOSS Odonnell Addison     In 1 month Malakoff, [place], DO [address], [address], [place] follow up on meds/policy informed to pt                  Future Appointments         Provider Department Appt Notes    In 1 week [address], [place]     In 1 month Malakoff, [place], DO Meghan Enriquez, [place] follow up on meds/policy informed to pt           Recent Outpatient Visits              2 months ago Type 2 diabetes mellitus with hyperglycemia, with long-term current use of insulin [facility])    [address], [address], [address], [address] [facility], APRN    Office Visit    5 months ago Essential hypertension Juan Carlos Arellano, [facility]    Office Visit    5 months ago Uncontrolled type 2 diabetes mellitus with hyperglycemia, with long-term current use of insulin ([address]    [address], [place] Gutierrez [place], APRN    Office Visit    8 months ago Type 2 diabetes mellitus with microalbuminuria, with long-term current use of insulin [facility])    [address], [place] Gutierrez [place], APRN    Office Visit    1 year ago Type 2 diabetes mellitus with microalbuminuria, with long-term current use of insulin [facility])    [address], [address] [facility], APRN    Office Visit

## 2023-06-09 RX ORDER — LOSARTAN POTASSIUM AND HYDROCHLOROTHIAZIDE 12.5; 5 MG/1; MG/1
1 TABLET ORAL DAILY
Qty: 90 TABLET | Refills: 1 | Status: SHIPPED | OUTPATIENT
Start: 2023-06-09

## 2023-06-15 RX ORDER — PROCHLORPERAZINE 25 MG/1
SUPPOSITORY RECTAL
Qty: 1 EACH | Refills: 0 | Status: SHIPPED | OUTPATIENT
Start: 2023-06-15

## 2023-06-21 ENCOUNTER — OFFICE VISIT (OUTPATIENT)
Dept: ENDOCRINOLOGY CLINIC | Facility: CLINIC | Age: 48
End: 2023-06-21

## 2023-06-21 ENCOUNTER — TELEPHONE (OUTPATIENT)
Dept: ENDOCRINOLOGY CLINIC | Facility: CLINIC | Age: 48
End: 2023-06-21

## 2023-06-21 VITALS
WEIGHT: 206 LBS | DIASTOLIC BLOOD PRESSURE: 75 MMHG | HEART RATE: 102 BPM | BODY MASS INDEX: 38 KG/M2 | SYSTOLIC BLOOD PRESSURE: 117 MMHG

## 2023-06-21 DIAGNOSIS — Z79.4 TYPE 2 DIABETES MELLITUS WITH HYPERGLYCEMIA, WITH LONG-TERM CURRENT USE OF INSULIN (HCC): Primary | ICD-10-CM

## 2023-06-21 DIAGNOSIS — E11.65 TYPE 2 DIABETES MELLITUS WITH HYPERGLYCEMIA, WITH LONG-TERM CURRENT USE OF INSULIN (HCC): Primary | ICD-10-CM

## 2023-06-21 LAB
CARTRIDGE LOT#: ABNORMAL NUMERIC
GLUCOSE BLOOD: 211
HEMOGLOBIN A1C: 7.2 % (ref 4.3–5.6)
TEST STRIP LOT #: NORMAL NUMERIC

## 2023-06-21 PROCEDURE — 99214 OFFICE O/P EST MOD 30 MIN: CPT | Performed by: NURSE PRACTITIONER

## 2023-06-21 PROCEDURE — 3074F SYST BP LT 130 MM HG: CPT | Performed by: NURSE PRACTITIONER

## 2023-06-21 PROCEDURE — 3051F HG A1C>EQUAL 7.0%<8.0%: CPT | Performed by: FAMILY MEDICINE

## 2023-06-21 PROCEDURE — 3078F DIAST BP <80 MM HG: CPT | Performed by: NURSE PRACTITIONER

## 2023-06-21 PROCEDURE — 82947 ASSAY GLUCOSE BLOOD QUANT: CPT | Performed by: NURSE PRACTITIONER

## 2023-06-21 PROCEDURE — 95251 CONT GLUC MNTR ANALYSIS I&R: CPT | Performed by: NURSE PRACTITIONER

## 2023-06-21 PROCEDURE — 83036 HEMOGLOBIN GLYCOSYLATED A1C: CPT | Performed by: NURSE PRACTITIONER

## 2023-06-21 RX ORDER — BLOOD-GLUCOSE SENSOR
1 EACH MISCELLANEOUS
Qty: 9 EACH | Refills: 1 | Status: SHIPPED | OUTPATIENT
Start: 2023-06-21

## 2023-06-21 RX ORDER — INSULIN ASPART 100 [IU]/ML
INJECTION, SOLUTION INTRAVENOUS; SUBCUTANEOUS
Qty: 66 ML | Refills: 0 | Status: SHIPPED | OUTPATIENT
Start: 2023-06-21 | End: 2023-06-22

## 2023-06-22 RX ORDER — INSULIN LISPRO 100 [IU]/ML
INJECTION, SOLUTION INTRAVENOUS; SUBCUTANEOUS
Qty: 66 ML | Refills: 0 | Status: SHIPPED | OUTPATIENT
Start: 2023-06-22

## 2023-06-22 NOTE — TELEPHONE ENCOUNTER
Leonides Yao,     Please see below message and advise if okay to switch to humalog. If agreeable, RX pended for your review. Thank you.

## 2023-06-26 ENCOUNTER — TELEPHONE (OUTPATIENT)
Dept: ENDOCRINOLOGY CLINIC | Facility: CLINIC | Age: 48
End: 2023-06-26

## 2023-06-26 NOTE — TELEPHONE ENCOUNTER
Pt is having issue with pharmacy. Per pt she she is unable to  her G7 sensor (prescribed 6/21/23) due to her having picked up a refill of her G6 transmitter (picked up at pharmacy on 6/15/23). Per pt she was unaware that her medications were going to change. Spoke to pharmacy and pharmacist stated that pt will be eligible to  [address] [facility] sensor on 7/12/23. Per pt she is also unable to  newly changed insulin brand until July 15th (pharmacist did not disclose why insulin could not be picked up sooner). Pt wanted to see if staff can call and see why insulin was not covered until than. Pt also wanted to make [facility] aware that because of the whole sensor issue she will be checking her sugars via lancets and test strips until she can  her new sensor.

## 2023-07-05 NOTE — TELEPHONE ENCOUNTER
Called pharmacy to inquire since Humalog is a preferred brand and patient is currently using admelog. Per [facility] pharmacist, they are getting a rejection message that it's too soon to fill. Next refillable date is 7/15/23. Per pharmacist, it's probably because it's in the same drug class as admelog. Called [facility] and spoke to Valarie. RN was on the phone with her for 20 minutes trying to figure out what can be done since PA is not an option. RN explained that although admelog and humalog are in the same drug class, it's being changed because it's not working in controlling pt's glucose. Valarie reached out to [facility] and states [employer] should be able to pay out tomorrow and if pharmacist is getting a message saying DUR override -- they should put their own DUR. RN clarified with Valarie if pharmacy would know what that means -- and said they should. RN called [facility] pharmacy again and explained the information/instruction from the insurance as above. States they will try tomorrow but they are not hopeful about it. RN called patient and gave her an update and instructed her to call pharmacy tomorrow afternoon and call back if there are any issues. Pt voiced understanding and agreeable with plan.

## 2023-07-10 ENCOUNTER — LAB ENCOUNTER (OUTPATIENT)
Dept: LAB | Age: 48
End: 2023-07-10
Attending: NURSE PRACTITIONER
Payer: MEDICAID

## 2023-07-10 DIAGNOSIS — Z79.4 UNCONTROLLED TYPE 2 DIABETES MELLITUS WITH HYPERGLYCEMIA, WITH LONG-TERM CURRENT USE OF INSULIN (HCC): ICD-10-CM

## 2023-07-10 DIAGNOSIS — E11.65 UNCONTROLLED TYPE 2 DIABETES MELLITUS WITH HYPERGLYCEMIA, WITH LONG-TERM CURRENT USE OF INSULIN (HCC): ICD-10-CM

## 2023-07-10 LAB
ALBUMIN SERPL-MCNC: 3.7 G/DL (ref 3.4–5)
ALBUMIN/GLOB SERPL: 1 {RATIO} (ref 1–2)
ALP LIVER SERPL-CCNC: 95 U/L
ALT SERPL-CCNC: 33 U/L
ANION GAP SERPL CALC-SCNC: 4 MMOL/L (ref 0–18)
AST SERPL-CCNC: 18 U/L (ref 15–37)
BILIRUB SERPL-MCNC: 0.4 MG/DL (ref 0.1–2)
BUN BLD-MCNC: 11 MG/DL (ref 7–18)
CALCIUM BLD-MCNC: 9.5 MG/DL (ref 8.5–10.1)
CHLORIDE SERPL-SCNC: 102 MMOL/L (ref 98–112)
CHOLEST SERPL-MCNC: 137 MG/DL (ref ?–200)
CO2 SERPL-SCNC: 31 MMOL/L (ref 21–32)
CREAT BLD-MCNC: 0.62 MG/DL
CREAT UR-SCNC: 233 MG/DL
FASTING PATIENT LIPID ANSWER: YES
FASTING STATUS PATIENT QL REPORTED: YES
GFR SERPLBLD BASED ON 1.73 SQ M-ARVRAT: 111 ML/MIN/1.73M2 (ref 60–?)
GLOBULIN PLAS-MCNC: 3.6 G/DL (ref 2.8–4.4)
GLUCOSE BLD-MCNC: 177 MG/DL (ref 70–99)
HDLC SERPL-MCNC: 43 MG/DL (ref 40–59)
LDLC SERPL CALC-MCNC: 67 MG/DL (ref ?–100)
MICROALBUMIN UR-MCNC: 4.46 MG/DL
MICROALBUMIN/CREAT 24H UR-RTO: 19.1 UG/MG (ref ?–30)
NONHDLC SERPL-MCNC: 94 MG/DL (ref ?–130)
OSMOLALITY SERPL CALC.SUM OF ELEC: 288 MOSM/KG (ref 275–295)
POTASSIUM SERPL-SCNC: 4.2 MMOL/L (ref 3.5–5.1)
PROT SERPL-MCNC: 7.3 G/DL (ref 6.4–8.2)
SODIUM SERPL-SCNC: 137 MMOL/L (ref 136–145)
TRIGL SERPL-MCNC: 156 MG/DL (ref 30–149)
VLDLC SERPL CALC-MCNC: 24 MG/DL (ref 0–30)

## 2023-07-10 PROCEDURE — 80053 COMPREHEN METABOLIC PANEL: CPT

## 2023-07-10 PROCEDURE — 82570 ASSAY OF URINE CREATININE: CPT

## 2023-07-10 PROCEDURE — 3061F NEG MICROALBUMINURIA REV: CPT | Performed by: FAMILY MEDICINE

## 2023-07-10 PROCEDURE — 36415 COLL VENOUS BLD VENIPUNCTURE: CPT

## 2023-07-10 PROCEDURE — 82043 UR ALBUMIN QUANTITATIVE: CPT

## 2023-07-10 PROCEDURE — 84443 ASSAY THYROID STIM HORMONE: CPT | Performed by: FAMILY MEDICINE

## 2023-07-10 PROCEDURE — 80061 LIPID PANEL: CPT

## 2023-07-11 ENCOUNTER — OFFICE VISIT (OUTPATIENT)
Dept: FAMILY MEDICINE CLINIC | Facility: CLINIC | Age: 48
End: 2023-07-11

## 2023-07-11 VITALS
WEIGHT: 208 LBS | DIASTOLIC BLOOD PRESSURE: 81 MMHG | BODY MASS INDEX: 39.27 KG/M2 | HEART RATE: 99 BPM | HEIGHT: 61 IN | TEMPERATURE: 98 F | SYSTOLIC BLOOD PRESSURE: 131 MMHG

## 2023-07-11 DIAGNOSIS — R30.0 BURNING WITH URINATION: ICD-10-CM

## 2023-07-11 DIAGNOSIS — E11.65 TYPE 2 DIABETES MELLITUS WITH HYPERGLYCEMIA, WITH LONG-TERM CURRENT USE OF INSULIN (HCC): Primary | ICD-10-CM

## 2023-07-11 DIAGNOSIS — Z79.4 TYPE 2 DIABETES MELLITUS WITH HYPERGLYCEMIA, WITH LONG-TERM CURRENT USE OF INSULIN (HCC): Primary | ICD-10-CM

## 2023-07-11 DIAGNOSIS — E78.2 MIXED HYPERLIPIDEMIA: ICD-10-CM

## 2023-07-11 DIAGNOSIS — E11.41 DIABETIC MONONEUROPATHY ASSOCIATED WITH TYPE 2 DIABETES MELLITUS (HCC): ICD-10-CM

## 2023-07-11 DIAGNOSIS — Z12.11 SCREENING FOR COLON CANCER: ICD-10-CM

## 2023-07-11 DIAGNOSIS — E66.01 SEVERE OBESITY (BMI 35.0-35.9 WITH COMORBIDITY): ICD-10-CM

## 2023-07-11 DIAGNOSIS — Z12.31 VISIT FOR SCREENING MAMMOGRAM: ICD-10-CM

## 2023-07-11 LAB — TSI SER-ACNC: 1.9 MIU/ML (ref 0.36–3.74)

## 2023-07-11 PROCEDURE — 3075F SYST BP GE 130 - 139MM HG: CPT | Performed by: FAMILY MEDICINE

## 2023-07-11 PROCEDURE — 99214 OFFICE O/P EST MOD 30 MIN: CPT | Performed by: FAMILY MEDICINE

## 2023-07-11 PROCEDURE — 3008F BODY MASS INDEX DOCD: CPT | Performed by: FAMILY MEDICINE

## 2023-07-11 PROCEDURE — 3079F DIAST BP 80-89 MM HG: CPT | Performed by: FAMILY MEDICINE

## 2023-07-11 RX ORDER — SULFAMETHOXAZOLE AND TRIMETHOPRIM 800; 160 MG/1; MG/1
1 TABLET ORAL 2 TIMES DAILY
Qty: 20 TABLET | Refills: 0 | Status: SHIPPED | OUTPATIENT
Start: 2023-07-11 | End: 2023-07-21

## 2023-07-31 RX ORDER — SEMAGLUTIDE 2.68 MG/ML
2 INJECTION, SOLUTION SUBCUTANEOUS WEEKLY
Qty: 9 ML | Refills: 0 | Status: SHIPPED | OUTPATIENT
Start: 2023-07-31

## 2023-08-01 RX ORDER — INSULIN DETEMIR 100 [IU]/ML
80 INJECTION, SOLUTION SUBCUTANEOUS NIGHTLY
Qty: 72 ML | Refills: 0 | Status: SHIPPED | OUTPATIENT
Start: 2023-08-01

## 2023-08-09 RX ORDER — PEN NEEDLE, DIABETIC 32GX 5/32"
NEEDLE, DISPOSABLE MISCELLANEOUS
Qty: 300 EACH | Refills: 2 | Status: SHIPPED | OUTPATIENT
Start: 2023-08-09

## 2023-08-23 ENCOUNTER — OFFICE VISIT (OUTPATIENT)
Dept: ENDOCRINOLOGY CLINIC | Facility: CLINIC | Age: 48
End: 2023-08-23

## 2023-08-23 VITALS
WEIGHT: 205 LBS | DIASTOLIC BLOOD PRESSURE: 73 MMHG | BODY MASS INDEX: 38.71 KG/M2 | HEIGHT: 60.98 IN | SYSTOLIC BLOOD PRESSURE: 112 MMHG | HEART RATE: 92 BPM

## 2023-08-23 DIAGNOSIS — E11.65 TYPE 2 DIABETES MELLITUS WITH HYPERGLYCEMIA, WITH LONG-TERM CURRENT USE OF INSULIN (HCC): Primary | ICD-10-CM

## 2023-08-23 DIAGNOSIS — Z79.4 TYPE 2 DIABETES MELLITUS WITH HYPERGLYCEMIA, WITH LONG-TERM CURRENT USE OF INSULIN (HCC): Primary | ICD-10-CM

## 2023-08-23 LAB
CARTRIDGE LOT#: ABNORMAL NUMERIC
GLUCOSE BLOOD: 149
HEMOGLOBIN A1C: 7.3 % (ref 4.3–5.6)
TEST STRIP LOT #: NORMAL NUMERIC

## 2023-08-23 PROCEDURE — 3008F BODY MASS INDEX DOCD: CPT | Performed by: NURSE PRACTITIONER

## 2023-08-23 PROCEDURE — 82947 ASSAY GLUCOSE BLOOD QUANT: CPT | Performed by: NURSE PRACTITIONER

## 2023-08-23 PROCEDURE — 3078F DIAST BP <80 MM HG: CPT | Performed by: NURSE PRACTITIONER

## 2023-08-23 PROCEDURE — 3074F SYST BP LT 130 MM HG: CPT | Performed by: NURSE PRACTITIONER

## 2023-08-23 PROCEDURE — 99214 OFFICE O/P EST MOD 30 MIN: CPT | Performed by: NURSE PRACTITIONER

## 2023-08-23 PROCEDURE — 3051F HG A1C>EQUAL 7.0%<8.0%: CPT | Performed by: NURSE PRACTITIONER

## 2023-08-23 PROCEDURE — 83036 HEMOGLOBIN GLYCOSYLATED A1C: CPT | Performed by: NURSE PRACTITIONER

## 2023-08-23 RX ORDER — TIRZEPATIDE 10 MG/.5ML
10 INJECTION, SOLUTION SUBCUTANEOUS WEEKLY
Qty: 2 ML | Refills: 0 | Status: SHIPPED | OUTPATIENT
Start: 2023-08-23

## 2023-08-31 ENCOUNTER — TELEPHONE (OUTPATIENT)
Dept: ENDOCRINOLOGY CLINIC | Facility: CLINIC | Age: 48
End: 2023-08-31

## 2023-08-31 RX ORDER — TIRZEPATIDE 10 MG/.5ML
10 INJECTION, SOLUTION SUBCUTANEOUS WEEKLY
Qty: 2 ML | Refills: 0 | Status: SHIPPED | OUTPATIENT
Start: 2023-08-31 | End: 2023-09-01

## 2023-09-01 RX ORDER — TIRZEPATIDE 7.5 MG/.5ML
7.5 INJECTION, SOLUTION SUBCUTANEOUS WEEKLY
Qty: 2 ML | Refills: 0 | Status: SHIPPED | OUTPATIENT
Start: 2023-09-01

## 2023-09-05 DIAGNOSIS — I10 ESSENTIAL HYPERTENSION: ICD-10-CM

## 2023-09-05 DIAGNOSIS — E78.2 MIXED HYPERLIPIDEMIA: ICD-10-CM

## 2023-09-05 DIAGNOSIS — R60.0 BILATERAL LEG EDEMA: ICD-10-CM

## 2023-09-05 DIAGNOSIS — E78.5 HYPERLIPIDEMIA, UNSPECIFIED HYPERLIPIDEMIA TYPE: ICD-10-CM

## 2023-09-05 RX ORDER — LOSARTAN POTASSIUM AND HYDROCHLOROTHIAZIDE 12.5; 5 MG/1; MG/1
1 TABLET ORAL DAILY
Qty: 90 TABLET | Refills: 2 | Status: SHIPPED | OUTPATIENT
Start: 2023-09-05

## 2023-09-05 RX ORDER — ATORVASTATIN CALCIUM 40 MG/1
40 TABLET, FILM COATED ORAL NIGHTLY
Qty: 90 TABLET | Refills: 3 | Status: SHIPPED | OUTPATIENT
Start: 2023-09-05

## 2023-09-05 NOTE — TELEPHONE ENCOUNTER
Refill passed per [place] [employer] protocol. Requested Prescriptions   Pending Prescriptions Disp Refills    ATORVASTATIN 40 MG Oral Tab [Pharmacy Med Name: ATORVASTATIN CALCIUM 40 MG ORAL TABLET] 90 tablet 0     Sig: Take 1 tablet (40 mg total) by mouth nightly. Cholesterol Medication Protocol Passed - 9/5/2023  9:35 AM        Passed - ALT in past 12 months        Passed - LDL in past 12 months        Passed - Last ALT < 80     Lab Results   Component Value Date    ALT 33 07/10/2023             Passed - Last LDL < 130     Lab Results   Component Value Date    LDL 67 07/10/2023             Passed - In person appointment or virtual visit in the past 12 mos or appointment in next 3 mos     Recent Outpatient Visits              1 week ago Type 2 diabetes mellitus with hyperglycemia, with long-term current use of insulin ([address]    [address], [address], [place], APRN    Office Visit    1 month ago Type 2 diabetes mellitus with hyperglycemia, with long-term current use of insulin ([address]    [address], [address], [place], DO    Office Visit    2 months ago Type 2 diabetes mellitus with hyperglycemia, with long-term current use of insulin ([address]    [address], [address], Trista Cisneros, APRN    Office Visit    5 months ago Type 2 diabetes mellitus with hyperglycemia, with long-term current use of insulin ([address]    [address], [address], [address] [facility], Trista Blunt, APRN    Office Visit    8 months ago Essential hypertension    [address], [address], [address], [place], [place]    Office Visit          Future Appointments         Provider Department Appt Notes    In 2 months [facility] [address] [facility] in about 3 months (around 11/23/2023) for follow up.                  LOSARTAN-HYDROCHLOROTHIAZIDE 50-12.5 MG Oral Tab [Pharmacy Med Name: LOSARTAN POTASSIUM-HCTZ 50-12.5 MG ORAL TABLET] 90 tablet 1     Sig: TAKE 1 TABLET BY MOUTH DAILY. Hypertensive Medications Protocol Passed - 9/5/2023  9:35 AM        Passed - In person appointment in the past 12 or next 3 months     Recent Outpatient Visits              1 week ago Type 2 diabetes mellitus with hyperglycemia, with long-term current use of insulin ([facility]    [address], [address], JOSS Sanders    Office Visit    1 month ago Type 2 diabetes mellitus with hyperglycemia, with long-term current use of insulin ([facility]    [address], [address], [place],     Office Visit    2 months ago Type 2 diabetes mellitus with hyperglycemia, with long-term current use of insulin ([facility]    [address], [address], [facility]    Office Visit    5 months ago Type 2 diabetes mellitus with hyperglycemia, with long-term current use of insulin ([facility]    [address], [address], [address] [facility], [facility]    Office Visit    8 months ago Essential hypertension    [address], [address], [address], [place], [place]    Office Visit          Future Appointments         Provider Department Appt Notes    In 2 months [facility]OSS Haines [address] [facility] about 3 months (around 11/23/2023) for follow up.                Passed - Last BP reading less than 140/90     BP Readings from Last 1 Encounters:  08/23/23 : 112/73              Passed - CMP or BMP in past 6 months     Recent Results (from the past 4392 hour(s))   COMP METABOLIC PANEL (14)    Collection Time: 07/10/23  7:36 AM   Result Value Ref Range    Glucose 177 (H) 70 - 99 mg/dL    Sodium 137 136 - 145 mmol/L    Potassium 4.2 3.5 - 5.1 mmol/L    Chloride 102 98 - 112 mmol/L    CO2 31.0 21.0 - 32.0 mmol/L    Anion Gap 4 0 - 18 mmol/L    BUN 11 7 - 18 mg/dL    Creatinine 0.62 0.55 - 1.02 mg/dL    Calcium, Total 9.5 8.5 - 10.1 mg/dL    Calculated Osmolality 288 275 - 295 mOsm/kg    eGFR-Cr 111 >=60 mL/min/1.73m2    AST 18 15 - 37 U/L    ALT 33 13 - 56 U/L    Alkaline Phosphatase 95 39 - 100 U/L    Bilirubin, Total 0.4 0.1 - 2.0 mg/dL    Total Protein 7.3 6.4 - 8.2 g/dL    Albumin 3.7 3.4 - 5.0 g/dL    Globulin  3.6 2.8 - 4.4 g/dL    A/G Ratio 1.0 1.0 - 2.0    Patient Fasting for CMP? Yes      *Note: Due to a large number of results and/or encounters for the requested time period, some results have not been displayed. A complete set of results can be found in Results Review. Passed - In person appointment or virtual visit in the past 6 months     Recent Outpatient Visits              1 week ago Type 2 diabetes mellitus with hyperglycemia, with long-term current use of insulin ([address]    Helen Mcmahan, [place] Paulene Najjar, APRN    Office Visit    1 month ago Type 2 diabetes mellitus with hyperglycemia, with long-term current use of insulin ([address]    Helen Mcmahan, Addison Elly Olszewski, DO    Office Visit    2 months ago Type 2 diabetes mellitus with hyperglycemia, with long-term current use of insulin ([address]    [facility]mahan[facility]    Office Visit    5 months ago Type 2 diabetes mellitus with hyperglycemia, with long-term current use of insulin ([address]    Helen Mcmahan, [address] [facility], APR    Office Visit    8 months ago Essential hypertension    [address], [address], [place], [place]    Office Visit          Future Appointments         Provider Department Appt Notes    In 2 months Paulene Najjar, APRN [address] [facility] in about 3 months (around 11/23/2023) for follow up.                Passed - EGFRCR or GFRNAA > 50     GFR Evaluation  EGFRCR: 111 , resulted on 7/10/2023             Future Appointments         Provider Department Appt Notes    In 2 months JOSS Bragg [facility], Helen SylviaStone Return in about 3 months (around 11/23/2023) for follow up.           Recent Outpatient Visits              1 week ago Type 2 diabetes mellitus with hyperglycemia, with long-term current use of insulin ([address]    Yoseph HardingHelen, [facility], APR[facility]    Office Visit    1 month ago Type 2 diabetes mellitus with hyperglycemia, with long-term current use of insulin ([address]    [address], Stone Quinter[place], DO    Office Visit    2 months ago Type 2 diabetes mellitus with hyperglycemia, with long-term current use of insulin ([address]    [address], [address]    Office Visit    5 months ago Type 2 diabetes mellitus with hyperglycemia, with long-term current use of insulin ([address]    [address], [facility], APRN    Office Visit    8 months ago Essential hypertension    [address], [address], [place], DO    Office Visit

## 2023-09-19 RX ORDER — INSULIN LISPRO 100 [IU]/ML
INJECTION, SOLUTION INTRAVENOUS; SUBCUTANEOUS
Qty: 66 ML | Refills: 0 | Status: SHIPPED | OUTPATIENT
Start: 2023-09-19

## 2023-09-26 RX ORDER — TIRZEPATIDE 7.5 MG/.5ML
7.5 INJECTION, SOLUTION SUBCUTANEOUS WEEKLY
Qty: 2 ML | Refills: 0 | OUTPATIENT
Start: 2023-09-26

## 2023-09-26 NOTE — TELEPHONE ENCOUNTER
Please call to follow up with patient to see how she has been tolerating Mounjaro 7.5mg weekly dose and also review glucose readings. Will try to decide if Mounjaro dose should be increased to 10mg weekly.

## 2023-10-25 ENCOUNTER — MED REC SCAN ONLY (OUTPATIENT)
Dept: ENDOCRINOLOGY CLINIC | Facility: CLINIC | Age: 48
End: 2023-10-25

## 2023-10-25 RX ORDER — TIRZEPATIDE 10 MG/.5ML
10 INJECTION, SOLUTION SUBCUTANEOUS WEEKLY
Qty: 2 ML | Refills: 2 | Status: SHIPPED | OUTPATIENT
Start: 2023-10-25

## 2023-10-25 NOTE — TELEPHONE ENCOUNTER
Rn called patient and states she has been tolerating wegovy 7.5 mg well.  Denies any symptoms , she is in second month of this dose  Dexcom report downloaded  States she noticed her aic is going up on dexcom so she is adjusting her diet and reducing her carbohydrate intake   Report emailed to [place] to print for your advise

## 2023-10-25 NOTE — TELEPHONE ENCOUNTER
[id number] Neema Lal noted. BG readings reviewed and steady hyperglycemia noted. Let's increase Mounjaro dose 7.5--> 10mg weekly. I have sent new rx to the pharmacy. Thank you!

## 2023-10-26 NOTE — TELEPHONE ENCOUNTER
LOV: 08/23/2023    RTC: 3 Months    FU:11/27/2023    Last Refill: 08/01/2023     3 Month Supply Pending      Please approve if applicable

## 2023-10-27 RX ORDER — INSULIN DETEMIR 100 [IU]/ML
85 INJECTION, SOLUTION SUBCUTANEOUS NIGHTLY
Qty: 78 ML | Refills: 0 | Status: SHIPPED | OUTPATIENT
Start: 2023-10-27 | End: 2024-01-25

## 2023-10-27 NOTE — TELEPHONE ENCOUNTER
Rn called patient to inform of dose change , patient aware, already picked up and used first dose today.

## 2023-11-27 ENCOUNTER — OFFICE VISIT (OUTPATIENT)
Dept: ENDOCRINOLOGY CLINIC | Facility: CLINIC | Age: 48
End: 2023-11-27

## 2023-11-27 VITALS
HEIGHT: 60 IN | BODY MASS INDEX: 40.64 KG/M2 | HEART RATE: 98 BPM | SYSTOLIC BLOOD PRESSURE: 122 MMHG | WEIGHT: 207 LBS | DIASTOLIC BLOOD PRESSURE: 78 MMHG

## 2023-11-27 DIAGNOSIS — Z79.4 TYPE 2 DIABETES MELLITUS WITH HYPERGLYCEMIA, WITH LONG-TERM CURRENT USE OF INSULIN (HCC): Primary | ICD-10-CM

## 2023-11-27 DIAGNOSIS — E11.65 TYPE 2 DIABETES MELLITUS WITH HYPERGLYCEMIA, WITH LONG-TERM CURRENT USE OF INSULIN (HCC): Primary | ICD-10-CM

## 2023-11-27 LAB
CARTRIDGE LOT#: ABNORMAL NUMERIC
GLUCOSE BLOOD: 131
HEMOGLOBIN A1C: 7.2 % (ref 4.3–5.6)
TEST STRIP LOT #: NORMAL NUMERIC

## 2023-11-27 RX ORDER — TIRZEPATIDE 15 MG/.5ML
15 INJECTION, SOLUTION SUBCUTANEOUS WEEKLY
Qty: 6 ML | Refills: 0 | Status: SHIPPED | OUTPATIENT
Start: 2023-12-18

## 2023-11-27 RX ORDER — TIRZEPATIDE 12.5 MG/.5ML
12.5 INJECTION, SOLUTION SUBCUTANEOUS WEEKLY
Qty: 2 ML | Refills: 0 | Status: SHIPPED | OUTPATIENT
Start: 2023-11-27

## 2023-11-27 NOTE — PATIENT INSTRUCTIONS
A1C: 7.2% today --> previously was 7.3% on 8/23/2023  Blood glucose: 131 in clinic today    Medications:   - increase Mounjaro 10 --> 12.5 mg once weekly for 4 weeks    - week 5: increase Mounjaro to 15mg weekly  -continue with Levemir 84 units once a day at 7 p.m.  -continue with Humalog 24 units with breakfast            20 units with lunch             20 units with dinner             10 units with night snack    Weight:  Wt Readings from Last 6 Encounters:   11/27/23 207 lb (93.9 kg)   08/23/23 205 lb (93 kg)   07/11/23 208 lb (94.3 kg)   06/21/23 206 lb (93.4 kg)   03/20/23 216 lb (98 kg)   12/20/22 218 lb (98.9 kg)     A1C goal:  <7.0%    Blood sugar testing:  Continue using Dexcom G7 continuous glucometer     Blood sugar targets:  Before breakfast:  (preferably < 110)  Before meals OR 2 hours after meals: <180 (preferably <150)     Call for persistent blood sugars < 75 or > 200

## 2023-12-04 RX ORDER — INSULIN LISPRO 100 [IU]/ML
INJECTION, SOLUTION INTRAVENOUS; SUBCUTANEOUS
Qty: 66 ML | Refills: 0 | Status: SHIPPED | OUTPATIENT
Start: 2023-12-04

## 2023-12-07 RX ORDER — ACYCLOVIR 400 MG/1
1 TABLET ORAL
Qty: 9 EACH | Refills: 1 | Status: SHIPPED | OUTPATIENT
Start: 2023-12-07

## 2024-01-02 ENCOUNTER — TELEPHONE (OUTPATIENT)
Dept: FAMILY MEDICINE CLINIC | Facility: CLINIC | Age: 49
End: 2024-01-02

## 2024-01-02 ENCOUNTER — MED REC SCAN ONLY (OUTPATIENT)
Dept: FAMILY MEDICINE CLINIC | Facility: CLINIC | Age: 49
End: 2024-01-02

## 2024-01-04 RX ORDER — INSULIN DETEMIR 100 [IU]/ML
85 INJECTION, SOLUTION SUBCUTANEOUS NIGHTLY
Qty: 78 ML | Refills: 0 | Status: SHIPPED | OUTPATIENT
Start: 2024-01-04 | End: 2024-04-03

## 2024-01-11 ENCOUNTER — TELEPHONE (OUTPATIENT)
Dept: ENDOCRINOLOGY CLINIC | Facility: CLINIC | Age: 49
End: 2024-01-11

## 2024-01-11 NOTE — TELEPHONE ENCOUNTER
DEXCOM G6 SENSOR Does not apply Misc, CHANGE SENSOR EVERY 10 DAYS, Disp: 3 each, Rfl: 2

## 2024-01-17 NOTE — TELEPHONE ENCOUNTER
Received fax from [employer] in regards to the Dexcom G7 . It has been approved effective 1/1/24 and ends 1/12/25.  Case # [id number]

## 2024-03-10 RX ORDER — INSULIN LISPRO 100 [IU]/ML
INJECTION, SOLUTION INTRAVENOUS; SUBCUTANEOUS
Qty: 66 ML | Refills: 0 | Status: SHIPPED | OUTPATIENT
Start: 2024-03-10

## 2024-03-10 RX ORDER — INSULIN DETEMIR 100 [IU]/ML
85 INJECTION, SOLUTION SUBCUTANEOUS NIGHTLY
Qty: 78 ML | Refills: 0 | Status: SHIPPED | OUTPATIENT
Start: 2024-03-10 | End: 2024-06-08

## 2024-03-23 ENCOUNTER — TELEPHONE (OUTPATIENT)
Dept: ENDOCRINOLOGY CLINIC | Facility: CLINIC | Age: 49
End: 2024-03-23

## 2024-03-23 DIAGNOSIS — E78.2 MIXED HYPERLIPIDEMIA: ICD-10-CM

## 2024-03-23 DIAGNOSIS — E78.5 HYPERLIPIDEMIA, UNSPECIFIED HYPERLIPIDEMIA TYPE: ICD-10-CM

## 2024-03-23 DIAGNOSIS — R60.0 BILATERAL LEG EDEMA: ICD-10-CM

## 2024-03-23 DIAGNOSIS — I10 ESSENTIAL HYPERTENSION: ICD-10-CM

## 2024-03-23 NOTE — TELEPHONE ENCOUNTER
Pt came to [facility] office requesting advanced refill on her meds. Pt states that her medicaid will end until end of month and would like a refill on her medications until she is able to renew it again. Pt would like a call back to discuss which medications she needs exactly.  needed. Please advise.

## 2024-03-25 RX ORDER — PEN NEEDLE, DIABETIC 32GX 5/32"
NEEDLE, DISPOSABLE MISCELLANEOUS
Qty: 500 EACH | Refills: 1 | Status: SHIPPED | OUTPATIENT
Start: 2024-03-25 | End: 2024-03-25

## 2024-03-25 RX ORDER — ACYCLOVIR 400 MG/1
1 TABLET ORAL
Qty: 9 EACH | Refills: 1 | Status: SHIPPED | OUTPATIENT
Start: 2024-03-25 | End: 2024-03-25

## 2024-03-25 RX ORDER — ATORVASTATIN CALCIUM 40 MG/1
40 TABLET, FILM COATED ORAL NIGHTLY
Qty: 90 TABLET | Refills: 0 | Status: SHIPPED | OUTPATIENT
Start: 2024-03-25

## 2024-03-25 RX ORDER — LOSARTAN POTASSIUM AND HYDROCHLOROTHIAZIDE 12.5; 5 MG/1; MG/1
1 TABLET ORAL DAILY
Qty: 90 TABLET | Refills: 2 | Status: SHIPPED | OUTPATIENT
Start: 2024-03-25

## 2024-03-25 RX ORDER — INSULIN LISPRO 100 [IU]/ML
INJECTION, SOLUTION INTRAVENOUS; SUBCUTANEOUS
Qty: 66 ML | Refills: 0 | Status: SHIPPED | OUTPATIENT
Start: 2024-03-25 | End: 2024-03-25

## 2024-03-25 RX ORDER — INSULIN LISPRO 100 [IU]/ML
INJECTION, SOLUTION INTRAVENOUS; SUBCUTANEOUS
Qty: 66 ML | Refills: 0 | Status: SHIPPED | OUTPATIENT
Start: 2024-03-25

## 2024-03-25 RX ORDER — LOSARTAN POTASSIUM AND HYDROCHLOROTHIAZIDE 12.5; 5 MG/1; MG/1
1 TABLET ORAL DAILY
Qty: 90 TABLET | Refills: 2 | Status: SHIPPED | OUTPATIENT
Start: 2024-03-25 | End: 2024-03-25

## 2024-03-25 RX ORDER — INSULIN DETEMIR 100 [IU]/ML
84 INJECTION, SOLUTION SUBCUTANEOUS NIGHTLY
Qty: 75.6 ML | Refills: 0 | Status: SHIPPED | OUTPATIENT
Start: 2024-03-25 | End: 2024-03-25

## 2024-03-25 RX ORDER — TIRZEPATIDE 15 MG/.5ML
15 INJECTION, SOLUTION SUBCUTANEOUS WEEKLY
Qty: 6 ML | Refills: 0 | Status: SHIPPED | OUTPATIENT
Start: 2024-03-25 | End: 2024-03-25

## 2024-03-25 RX ORDER — ACYCLOVIR 400 MG/1
1 TABLET ORAL
Qty: 9 EACH | Refills: 1 | Status: SHIPPED | OUTPATIENT
Start: 2024-03-25

## 2024-03-25 RX ORDER — ATORVASTATIN CALCIUM 40 MG/1
40 TABLET, FILM COATED ORAL NIGHTLY
Qty: 90 TABLET | Refills: 0 | Status: SHIPPED | OUTPATIENT
Start: 2024-03-25 | End: 2024-03-25

## 2024-03-25 RX ORDER — PEN NEEDLE, DIABETIC 32GX 5/32"
NEEDLE, DISPOSABLE MISCELLANEOUS
Qty: 500 EACH | Refills: 1 | Status: SHIPPED | OUTPATIENT
Start: 2024-03-25

## 2024-03-25 RX ORDER — ISOPRPOPYL ALCOHOL 70 ML/100ML
1 SWAB TOPICAL 3 TIMES DAILY
Qty: 500 EACH | Refills: 1 | Status: SHIPPED | OUTPATIENT
Start: 2024-03-25 | End: 2024-03-25

## 2024-03-25 RX ORDER — ISOPRPOPYL ALCOHOL 70 ML/100ML
1 SWAB TOPICAL 3 TIMES DAILY
Qty: 500 EACH | Refills: 1 | Status: SHIPPED | OUTPATIENT
Start: 2024-03-25

## 2024-03-25 RX ORDER — TIRZEPATIDE 15 MG/.5ML
15 INJECTION, SOLUTION SUBCUTANEOUS WEEKLY
Qty: 6 ML | Refills: 0 | Status: SHIPPED | OUTPATIENT
Start: 2024-03-25

## 2024-03-25 RX ORDER — INSULIN DETEMIR 100 [IU]/ML
84 INJECTION, SOLUTION SUBCUTANEOUS NIGHTLY
Qty: 75.6 ML | Refills: 0 | Status: SHIPPED | OUTPATIENT
Start: 2024-03-25 | End: 2024-06-23

## 2024-03-25 NOTE — TELEPHONE ENCOUNTER
Per patient her Medicaid was denied and she is not allowed to re-apply until after 30 days. Patient has coverage until the end of this month.   Patient expressed frustration because she is currently not  working and cannot afford out of pocket insulin costs.   Patient is afraid to be without any medications to manage her diabetes, therefore, patient requests  a 90 day prescription for metformin or more affordable insulin option.  Please advice.  Thank you.

## 2024-03-25 NOTE — TELEPHONE ENCOUNTER
Called pt and notified that medications have been sent. Pt requested that meds be sent to [facility]. Rx sent.   Advised pt to call if medications are not afforadble so we are able to change accordingly. Pt verbalized understanding.

## 2024-03-25 NOTE — TELEPHONE ENCOUNTER
Ok noted.     All of her medications have been refilled for 90 day supply.     Please inform patient that if she does not have insurance and is not able to afford her medications at next refill to call and notify me so we can formulate an alternative treatment plan. Will likely transition to 70/30 insulin in which she can purchase at [facility] for a lesser out of pocket cost.       Thank you!

## 2024-03-29 NOTE — TELEPHONE ENCOUNTER
Current Outpatient Medications   Medication Sig Dispense Refill    Tirzepatide (MOUNJARO) 15 MG/0.5ML Subcutaneous Solution Pen-injector Inject 15 mg into the skin once a week. 6 mL 0

## 2024-04-03 RX ORDER — TIRZEPATIDE 15 MG/.5ML
15 INJECTION, SOLUTION SUBCUTANEOUS WEEKLY
Qty: 6 ML | Refills: 0 | Status: SHIPPED | OUTPATIENT
Start: 2024-04-03

## 2024-04-03 NOTE — TELEPHONE ENCOUNTER
Endocrine Refill protocol for oral and injectable diabetic medications    Protocol Criteria:    -Appointment with Endocrinology completed in the last 6 months or scheduled in the next 3 months    -A1c result <8.5% in the past 6 months      Verify the above has been completed or scheduled in the appropriate timeline. If so can send a 90 day supply with 1 refill.     Last completed office visit:11/27/23  Next scheduled Follow up: 04/29/24  Last A1c result: 7.2

## 2024-04-05 ENCOUNTER — TELEPHONE (OUTPATIENT)
Dept: ENDOCRINOLOGY CLINIC | Facility: CLINIC | Age: 49
End: 2024-04-05

## 2024-04-05 NOTE — TELEPHONE ENCOUNTER
Current Outpatient Medications:     Tirzepatide (MOUNJARO) 15 MG/0.5ML Subcutaneous Solution Pen-injector, Inject 15 mg into the skin once a week., Disp: 6 mL, Rfl: 0    NOVOLOG 70/30 FLEXPEN RELION Subcutaneous, Inject 76 Units into the skin 2 (two) times daily before meals., Disp: 136.8 mL, Rfl: 0    Alcohol Swabs (ULTRA-CARE ALCOHOL PREP PADS) 70 % Does not apply Pads, Apply 1 each topically in the morning and 1 each before bedtime., Disp: 200 each, Rfl: 1    atorvastatin 40 MG Oral Tab, Take 1 tablet (40 mg total) by mouth nightly., Disp: 90 tablet, Rfl: 0    Insulin Pen Needle (PEN NEEDLES) 32G X 4 MM Does not apply Misc, Inject 1 each into the skin in the morning and 1 each before bedtime., Disp: 200 each, Rfl: 1    losartan-hydroCHLOROthiazide 50-12.5 MG Oral Tab, Take 1 tablet by mouth daily., Disp: 90 tablet, Rfl: 1    BD PEN NEEDLE HOSSEIN U/F 32G X 4 MM Does not apply Misc, USE UTD, Disp: 500 each, Rfl: 1    Continuous Blood Gluc Sensor (DEXCOM G7 SENSOR) Does not apply Misc, 1 each Every 10 days., Disp: 9 each, Rfl: 1    insulin detemir (LEVEMIR FLEXPEN) 100 UNIT/ML Subcutaneous Solution Pen-injector, Inject 84 Units into the skin nightly., Disp: 75.6 mL, Rfl: 0    Insulin Lispro, 1 Unit Dial, 100 UNIT/ML Subcutaneous Solution Pen-injector, Inject 24 units with breakfast, 20 units with lunch, 20 units with dinner and 10 units with snack. Max daily dose 74 units., Disp: 66 mL, Rfl: 0    TRUEPLUS PEN NEEDLES 32G X 4 MM Does not apply Misc, USE TO INJECT UP TO ADRIENNE VECES AL SAROJ, Disp: 300 each, Rfl: 2    Glucose Blood (ONETOUCH VERIO) In Vitro Strip, CHECK BLOOD SUGAR 4 TIMES PER DAY, Disp: 400 strip, Rfl: 0    Lancets (ONETOUCH DELICA PLUS [id number]) Does not apply Misc, 1 strip by In Vitro route 2 (two) times daily. Check 4 times per day, Disp: 400 each, Rfl: 1    Glucose Blood (CONTOUR NEXT TEST) In Vitro Strip, Check 2 times per day, Disp: 200 strip, Rfl: 3    Blood Glucose Monitoring Suppl (TRUE METRIX  METER) w/Device Does not apply Kit, 1 kit by Does not apply route daily., Disp: 1 kit, Rfl: 0    Glucose Blood In Vitro Strip, Check BS twice daily, Disp: 100 strip, Rfl: 11    Blood Glucose Monitoring Suppl (TIAGO CONTOUR NEXT EZ) w/Device Does not apply Kit, 1 Device by Does not apply route daily., Disp: 1 kit, Rfl: 0    Insulin Pen Needle 32G X 6 MM Does not apply Misc, use with insulin pen as directed, Disp: , Rfl:

## 2024-04-10 NOTE — TELEPHONE ENCOUNTER
Medication PA Requested: Mounjaro 15 mg/0.5 mL                                                      CoverMyMeds Used:  Key:  Quantity: 6 mL  Day Supply: 90 days  Sig: Inject 15 mg into the skin once a week  DX Code:     E11.65                          CPT code (if applicable):   Case Number/Pending Ref#:

## 2024-04-11 NOTE — TELEPHONE ENCOUNTER
Called [employer] at [phone], spoke with Marquis BAER, states patient insurance was termed on 3/31/24    Call ref# [id number]    Unable to process via EPA or CMM    Patient has BCC on file    Message to Endo

## 2024-05-23 ENCOUNTER — NURSE TRIAGE (OUTPATIENT)
Dept: FAMILY MEDICINE CLINIC | Facility: CLINIC | Age: 49
End: 2024-05-23

## 2024-05-23 NOTE — TELEPHONE ENCOUNTER
Patient states that she has a kidney infection. She is requesting antibiotics. Offered to help schedule an appt with Provider, Provider's soonest appt is on 6/6/24. Patient refused. Patient refused to see another Provider. Refused to go to IC.    Patient is requesting medication to be sent to [facility] in [place].

## 2024-05-23 NOTE — TELEPHONE ENCOUNTER
Action Requested: Summary for Provider     []  Critical Lab, Recommendations Needed  [] Need Additional Advice  [x]   FYI    []   Need Orders  [] Need Medications Sent to Pharmacy  []  Other   FYI  Dr Osborne  SUMMARY:  patient initially asked if you would be willing to send antibiotic.   Explained to patient appointment is required.     Reason for call: Urinary Symptoms  Onset: 2 weeks ago.     2 weeks ago started noticing blood in urine. Has flank pain.   Patient has no fever.     I advised she schedule an appointment. Patient states she has Has no insurance.   Has no money to pay out of pocket.  The self pay cost is too pricey for her to pay.     She is diabetic and also can't afford cost of medications.   Expressed concerned and advised best to get to health dep.    I advised she go to [facility] department and might be able to get less costly visit.   Advised her to call [facility] at [phone]   Reason for Disposition   Side (flank) or back pain present    Protocols used: Urine - Blood In-A-OH

## 2024-07-02 ENCOUNTER — OFFICE VISIT (OUTPATIENT)
Dept: FAMILY MEDICINE CLINIC | Facility: CLINIC | Age: 49
End: 2024-07-02

## 2024-07-02 ENCOUNTER — HOSPITAL ENCOUNTER (OUTPATIENT)
Dept: GENERAL RADIOLOGY | Age: 49
Discharge: HOME | End: 2024-07-02
Attending: FAMILY MEDICINE
Payer: MEDICAID

## 2024-07-02 VITALS
HEIGHT: 60 IN | WEIGHT: 213 LBS | TEMPERATURE: 97 F | HEART RATE: 99 BPM | SYSTOLIC BLOOD PRESSURE: 132 MMHG | BODY MASS INDEX: 41.82 KG/M2 | DIASTOLIC BLOOD PRESSURE: 83 MMHG

## 2024-07-02 DIAGNOSIS — R10.9 RIGHT FLANK PAIN: ICD-10-CM

## 2024-07-02 DIAGNOSIS — E11.65 TYPE 2 DIABETES MELLITUS WITH HYPERGLYCEMIA, WITH LONG-TERM CURRENT USE OF INSULIN (HCC): Primary | ICD-10-CM

## 2024-07-02 DIAGNOSIS — Z00.00 ADULT GENERAL MEDICAL EXAM: Primary | ICD-10-CM

## 2024-07-02 DIAGNOSIS — Z79.4 TYPE 2 DIABETES MELLITUS WITH HYPERGLYCEMIA, WITH LONG-TERM CURRENT USE OF INSULIN (HCC): Primary | ICD-10-CM

## 2024-07-02 DIAGNOSIS — E11.65 UNCONTROLLED DIABETES MELLITUS WITH HYPERGLYCEMIA, WITH LONG-TERM CURRENT USE OF INSULIN (HCC): ICD-10-CM

## 2024-07-02 DIAGNOSIS — R10.11 RUQ ABDOMINAL PAIN: ICD-10-CM

## 2024-07-02 DIAGNOSIS — E11.65 TYPE 2 DIABETES MELLITUS WITH HYPERGLYCEMIA, WITH LONG-TERM CURRENT USE OF INSULIN (HCC): ICD-10-CM

## 2024-07-02 DIAGNOSIS — Z12.31 VISIT FOR SCREENING MAMMOGRAM: ICD-10-CM

## 2024-07-02 DIAGNOSIS — Z79.4 UNCONTROLLED DIABETES MELLITUS WITH HYPERGLYCEMIA, WITH LONG-TERM CURRENT USE OF INSULIN (HCC): ICD-10-CM

## 2024-07-02 DIAGNOSIS — Z79.4 TYPE 2 DIABETES MELLITUS WITH HYPERGLYCEMIA, WITH LONG-TERM CURRENT USE OF INSULIN (HCC): ICD-10-CM

## 2024-07-02 DIAGNOSIS — Z12.11 SCREENING FOR COLON CANCER: ICD-10-CM

## 2024-07-02 PROCEDURE — 99396 PREV VISIT EST AGE 40-64: CPT | Performed by: FAMILY MEDICINE

## 2024-07-02 PROCEDURE — 74018 RADEX ABDOMEN 1 VIEW: CPT | Performed by: FAMILY MEDICINE

## 2024-07-02 PROCEDURE — 99213 OFFICE O/P EST LOW 20 MIN: CPT | Performed by: FAMILY MEDICINE

## 2024-07-02 RX ORDER — INSULIN LISPRO 100 [IU]/ML
INJECTION, SOLUTION INTRAVENOUS; SUBCUTANEOUS
Qty: 66 ML | Refills: 0 | Status: SHIPPED | OUTPATIENT
Start: 2024-07-02

## 2024-07-02 RX ORDER — PHENAZOPYRIDINE HYDROCHLORIDE 100 MG/1
1 TABLET, FILM COATED ORAL
COMMUNITY
Start: 2024-06-04

## 2024-07-02 NOTE — PROGRESS NOTES
Patient ID: Rivka Anders is a 49 year old female.    HPI  Chief Complaint   Patient presents with    Diabetes    Referral     Endo     She is here for physical exam and has other issues.    She reports she had labs by a nurse practitioner.  They are in the system.  I reviewed them.  She states for the last couple of months she has not had insurance so she thinks her hemoglobin A1c will be higher as was unable to take some of the medications.  She needs a referral to endocrinology which we will do today.  She does have insurance again.    She did have the blood pressure and cholesterol medication at home but the other medications she was unable to purchase without the insurance.    She did see Rody Reese nurse practitioner in June and I see the labs and note.  An ultrasound of the kidneys was ordered but patient did not have insurance at the time and therefore did not do it.  She would rather this go through me if needed.    She had 1 day of right flank pain when she saw Rody Reese.   Patient states there is no discomfort with urination.  No frequency of urination.  No nausea or vomiting.  The pain in the right upper quadrant does not occur with eating.  It happens randomly and can last 1 hour.  She still has her gallbladder.  No history of kidney stones.        Health Maintenance   Topic Date Due    Colorectal Cancer Screening  Never done    Annual Physical  Never done    Mammogram  07/19/2017    COVID-19 Vaccine (4 - 2023-24 season) 09/01/2023    Annual Depression Screening  01/01/2024    Pap Smear  04/28/2024    Diabetes Care A1C  05/27/2024    Diabetes Care: GFR  07/10/2024    Diabetes Care: Microalb/Creat Ratio  07/10/2024    Influenza Vaccine (1) 10/01/2024    Diabetes Care Dilated Eye Exam  11/13/2024    Diabetes Care Foot Exam  11/27/2024    DTaP,Tdap,and Td Vaccines (2 - Td or Tdap) 04/16/2028    Pneumococcal Vaccine: Birth to 64yrs  Completed        =======================================================    Lab Results   Component Value Date    WBC 11.9 (H) 06/10/2021    RBC 4.81 06/10/2021    HGB 13.8 06/10/2021    HCT 44.1 06/10/2021    .0 06/10/2021    MPV 8.6 08/14/2013    MCV 91.7 06/10/2021    MCH 28.7 06/10/2021    MCHC 31.3 06/10/2021    RDW 12.2 06/10/2021    NEPRELIM 7.22 06/10/2021    NEUT 6.8 08/14/2013    LYMPH 3.5 08/14/2013    MON 0.4 08/14/2013    EOS 0.2 08/14/2013    BASO 0.1 08/14/2013    NEPERCENT 60.3 06/10/2021    LYPERCENT 32.7 06/10/2021    MOPERCENT 4.3 06/10/2021    EOPERCENT 1.7 06/10/2021    BAPERCENT 0.7 06/10/2021    NE 7.22 06/10/2021    LYMABS 3.90 06/10/2021    MOABSO 0.51 06/10/2021    EOABSO 0.20 06/10/2021    BAABSO 0.08 06/10/2021       Lab Results   Component Value Date     (H) 07/10/2023    BUN 11 07/10/2023    BUNCREA 28.8 (H) 06/10/2021    CREATSERUM 0.62 07/10/2023    ANIONGAP 4 07/10/2023    GFRNAA 113 06/10/2021    GFRAA 130 06/10/2021    CA 9.5 07/10/2023    OSMOCALC 288 07/10/2023    ALKPHO 95 07/10/2023    AST 18 07/10/2023    ALT 33 07/10/2023    ALKPHOS 62 12/05/2015    BILT 0.4 07/10/2023    TP 7.3 07/10/2023    ALB 3.7 07/10/2023    GLOBULIN 3.6 07/10/2023    AGRATIO 1.2 12/05/2015     07/10/2023    K 4.2 07/10/2023     07/10/2023    CO2 31.0 07/10/2023       Lab Results   Component Value Date     (H) 07/10/2023    BUN 11 07/10/2023    CREATSERUM 0.62 07/10/2023    BUNCREA 28.8 (H) 06/10/2021    ANIONGAP 4 07/10/2023    GFRAA 130 06/10/2021    GFRNAA 113 06/10/2021    CA 9.5 07/10/2023     07/10/2023    K 4.2 07/10/2023     07/10/2023    CO2 31.0 07/10/2023    OSMOCALC 288 07/10/2023       Lab Results   Component Value Date    SPECGRAVITY >=1.030 02/15/2022    GLUUR Negative 02/15/2022    PHURINE 6.0 07/22/2021    NITRITE Negative 07/22/2021     Lab Results   Component Value Date     (H) 06/10/2021    A1C 7.2 (A) 11/27/2023    A1C 7.3 (A) 08/23/2023     A1C 7.2 (A) 2023       Lab Results   Component Value Date    MALBP 4.46 07/10/2023    CREUR 233.00 07/10/2023    CREAURINE 123.0 2016    MIALBURINE 1.1 2016    MCRRATIOUR 8.9 2016       Lab Results   Component Value Date    CHOLEST 137 07/10/2023    TRIG 156 (H) 07/10/2023    HDL 43 07/10/2023    LDL 67 07/10/2023    VLDL 24 07/10/2023    NONHDLC 94 07/10/2023    CALCNONHDL 170 (H) 2016    CALCNONHDL 148 (H) 2015    CALCNONHDL 156 (H) 2015     TSH (mIU/mL)   Date Value   07/10/2023 1.900     TSH (S) (uIU/mL)   Date Value   2015 1.46       No results found for: \"B12\", \"VITB12\"    No results found for: \"IRON\", \"IRONTOT\"    No results found for: \"SAT\"    No results found for: \"IRON\", \"IRONTOT\", \"TIBC\", \"IRONSAT\", \"TRANSFERRIN\", \"TIBCP\", \"IRONBIND\", \"SAT\", \"SATUR\"    No results found for: \"CALLIE\"    No results found for: \"VITD\", \"QVITD\", \"VHIX19QV\"  No results found for: \"PSA\", \"QPSA\", \"TOTPSASCREEN\"    =======================================================    Wt Readings from Last 6 Encounters:   24 213 lb (96.6 kg)   23 207 lb (93.9 kg)   23 205 lb (93 kg)   23 208 lb (94.3 kg)   23 206 lb (93.4 kg)   23 216 lb (98 kg)               BMI Readings from Last 6 Encounters:   24 41.60 kg/m²   23 40.43 kg/m²   23 38.75 kg/m²   23 39.30 kg/m²   23 37.68 kg/m²   23 39.51 kg/m²       BP Readings from Last 6 Encounters:   24 132/83   23 122/78   23 112/73   23 131/81   23 117/75   23 135/81         Review of Systems  Patient denies any chest pain, shortness breath, diaphoresis, dizziness, hypoglycemia, numbness or tingling in the legs.      Past Medical History:    Amblyopia    OD    Diabetes (HCC)    Hyperlipidemia    Pregnancy (HCC)    12 hr labor , [facility], 33 1.2 week, 6 lb(s) Male / Anthony BARRERA (Kathryn Hernandez) Baby sent to [facility] for 1 month due to  breathing issues    Pregnancy ([facility])    8 hr labor  ; [facility]; 36 week 7 lb(s) Male; [place] ([facility]) baby had problems breathing and remained in hospital for 2 weeks    Pregnancy ([facility])    spontaneous , unknown sex, SAB at 6 weeks no D&C    Pregnancy ([facility])    D&C 2009    Type II diabetes mellitus ([facility])    Unspecified essential hypertension    stopped medication     Urinary incontinence    surgical       Past Surgical History:   Procedure Laterality Date    Injection (ia)      injection tendon sheath, ligament (Marco Antonio Campos) (x2)    Retinal laser procedure Left     [facility]       Social History     Socioeconomic History    Marital status: Single     Spouse name: Not on file    Number of children: Not on file    Years of education: Not on file    Highest education level: Not on file   Occupational History    Not on file   Tobacco Use    Smoking status: Never    Smokeless tobacco: Never   Vaping Use    Vaping status: Never Used   Substance and Sexual Activity    Alcohol use: No    Drug use: No    Sexual activity: Not on file   Other Topics Concern    Not on file   Social History Narrative    Not on file     Social Determinants of Health     Financial Resource Strain: Not on File (2024)    Received from [employer]     Financial Resource Strain     Financial Resource Strain: 0   Food Insecurity: Not on File (2024)    Received from [employer]     Food Insecurity     Food: 0   Transportation Needs: Not on File (2024)    Received from [employer]     Transportation Needs     Transportation: 0   Physical Activity: Not on File (2024)    Received from [employer]     Physical Activity     Physical Activity: 0   Stress: Not on File (2024)    Received from [employer]     Stress     Stress: 0   Social Connections: Not on File (2024)    Received from [employer]     Social Connections     Social Connections and Isolation: 0   Housing Stability: Not on File (2024)     Received from [facility]     Housin          Current Outpatient Medications   Medication Sig Dispense Refill    phenazopyridine 100 MG Oral Tab Take 1 tablet (100 mg total) by mouth 3 (three) times daily with meals.      Alcohol Swabs (ULTRA-CARE ALCOHOL PREP PADS) 70 % Does not apply Pads Apply 1 each topically in the morning and 1 each before bedtime. 200 each 1    atorvastatin 40 MG Oral Tab Take 1 tablet (40 mg total) by mouth nightly. 90 tablet 0    Insulin Pen Needle (PEN NEEDLES) 32G X 4 MM Does not apply Misc Inject 1 each into the skin in the morning and 1 each before bedtime. 200 each 1    losartan-hydroCHLOROthiazide 50-12.5 MG Oral Tab Take 1 tablet by mouth daily. 90 tablet 1    BD PEN NEEDLE HOSSEIN U/F 32G X 4 MM Does not apply Misc USE  each 1    Continuous Blood Gluc Sensor (DEXCOM G7 SENSOR) Does not apply Misc 1 each Every 10 days. 9 each 1    TRUEPLUS PEN NEEDLES 32G X 4 MM Does not apply Misc USE TO INJECT UP TO ADRIENNE VECES AL SAROJ 300 each 2    Glucose Blood (ONETOUCH VERIO) In Vitro Strip CHECK BLOOD SUGAR 4 TIMES PER  strip 0    Lancets (ONETOUCH DELICA PLUS GQORGS86M) Does not apply Misc 1 strip by In Vitro route 2 (two) times daily. Check 4 times per day 400 each 1    Glucose Blood (CONTOUR NEXT TEST) In Vitro Strip Check 2 times per day 200 strip 3    Blood Glucose Monitoring Suppl (TRUE METRIX METER) w/Device Does not apply Kit 1 kit by Does not apply route daily. 1 kit 0    Glucose Blood In Vitro Strip Check BS twice daily 100 strip 11    Blood Glucose Monitoring Suppl (TIAGO CONTOUR NEXT EZ) w/Device Does not apply Kit 1 Device by Does not apply route daily. 1 kit 0    Insulin Pen Needle 32G X 6 MM Does not apply Misc use with insulin pen as directed      Tirzepatide (MOUNJARO) 15 MG/0.5ML Subcutaneous Solution Pen-injector Inject 15 mg into the skin once a week. (Patient not taking: Reported on 2024) 6 mL 0    Insulin Lispro, 1 Unit Dial, 100 UNIT/ML  Subcutaneous Solution Pen-injector Inject 24 units with breakfast, 20 units with lunch, 20 units with dinner and 10 units with snack. Max daily dose 74 units. (Patient not taking: Reported on 7/2/2024) 66 mL 0     Allergies:No Known Allergies   PHYSICAL EXAM:   Physical Exam  Physical Exam   Constitutional: . She appears well-developed and well-nourished. No distress.   HENT:   Head: Normocephalic.   Right Ear: Tympanic membrane and ear canal normal.   Left Ear: Tympanic membrane and ear canal normal.   Mouth/Throat: Oropharynx is clear and moist and mucous membranes are normal.   Eyes: Conjunctivae and EOM are normal. Pupils are equal, round, and reactive to light.   Neck: Normal range of motion. Neck supple. No thyromegaly present.   Cardiovascular: Normal rate, regular rhythm and no murmur heard.  Pulmonary/Chest: Effort normal and breath sounds normal. No respiratory distress.   Abdominal: Soft. Bowel sounds are normal. There is no hepatosplenomegaly. There is positive tenderness in the right upper quadrant but negative Holman sign.  No tenderness in the right flank area.  Lymphadenopathy:     She has no  cervical adenopathy.   Neurological: She is alert and oriented to person, place, and time . She has normal reflexes. No cranial nerve deficit.   Skin: Skin is warm and dry. No rash noted.   Psychiatric: She has a normal mood and affect   Vitals reviewed.    Blood pressure 132/83, pulse 99, temperature 96.7 °F (35.9 °C), height 5' (1.524 m), weight 213 lb (96.6 kg), not currently breastfeeding.         ASSESSMENT/PLAN:     Diagnoses and all orders for this visit:    Adult general medical exam  -     AST (SGOT); Future  -     ALT(SGPT); Future  -     Basic Metabolic Panel (8); Future  -     CBC With Differential With Platelet; Future  -     Lipid Panel; Future  She should do labs in 3 months as she has been off medications for quite some time.  She will see the endocrinologist for her diabetes.  Type 2 diabetes  mellitus with hyperglycemia, with long-term current use of insulin (HCC)  -     Hemoglobin A1C; Future  She is on insulin lispro.  She states I should not be sending in the Mounjaro that was ordered by endocrinology as the insurance would not cover it.  Uncontrolled diabetes mellitus with hyperglycemia, with long-term current use of insulin ([facility])  -     ENDOCRINOLOGY - INTERNAL  -     Insulin Lispro, 1 Unit Dial, 100 UNIT/ML Subcutaneous Solution Pen-injector; Inject 24 units with breakfast, 20 units with lunch, 20 units with dinner and 10 units with snack. Max daily dose 74 units.  -     Hemoglobin A1C; Future    Visit for screening mammogram  -     [facility] ELDER 2D+3D SCREENING BILAT (CPT=77067/81192); Future    RUQ abdominal pain  -     XR ABDOMEN (1 VIEW) (CPT=74018); Future  She states that hurts in the right upper quadrant and flank area but not all the time.  It just happens randomly.  I am going to do an x-ray of the abdomen just to see if there is a kidney stone in that area.  If negative then an ultrasound of the liver may be worthwhile.  Right flank pain  -     XR ABDOMEN (1 VIEW) (CPT=74018); Future    Screening for colon cancer  -     GASTRO - INTERNAL        Referrals (if applicable)  Orders Placed This Encounter   Procedures    ENDOCRINOLOGY - INTERNAL     Endocrinology Department phone number is [phone].     Referral Priority:   Routine     Referral Type:   OFFICE VISIT     Referred to Provider:   Tricia Wu APRN     Requested Specialty:   ENDOCRINOLOGY     Number of Visits Requested:   3    GASTRO - INTERNAL     Gastroenterology Department phone number is [phone].  ++++ Ask for first available provider that can see you ++++.     Referral Priority:   Routine     Referral Type:   OFFICE VISIT     Requested Specialty:   GASTROENTEROLOGY     Number of Visits Requested:   3         Follow up if symptoms persist.  Take medicine (if given) as prescribed.  Approach to treatment discussed and  patient/family member understands and agrees to plan.     No follow-ups on file.      Ashok Osborne DO  7/2/2024

## 2024-07-03 RX ORDER — BLOOD SUGAR DIAGNOSTIC
STRIP MISCELLANEOUS
Qty: 300 STRIP | Refills: 1 | Status: SHIPPED | OUTPATIENT
Start: 2024-07-03 | End: 2024-07-03

## 2024-07-03 RX ORDER — BLOOD SUGAR DIAGNOSTIC
STRIP MISCELLANEOUS
Qty: 300 STRIP | Refills: 1 | Status: SHIPPED | OUTPATIENT
Start: 2024-07-03

## 2024-07-03 RX ORDER — ACYCLOVIR 400 MG/1
1 TABLET ORAL
Qty: 9 EACH | Refills: 1 | Status: SHIPPED | OUTPATIENT
Start: 2024-07-03

## 2024-07-03 RX ORDER — INSULIN GLARGINE 100 [IU]/ML
85 INJECTION, SOLUTION SUBCUTANEOUS DAILY
Qty: 76.5 ML | Refills: 0 | Status: SHIPPED | OUTPATIENT
Start: 2024-07-03 | End: 2024-07-09

## 2024-07-03 RX ORDER — INSULIN LISPRO 100 [IU]/ML
INJECTION, SOLUTION INTRAVENOUS; SUBCUTANEOUS
Qty: 67 ML | Refills: 0 | Status: SHIPPED | OUTPATIENT
Start: 2024-07-03 | End: 2024-07-09

## 2024-07-03 NOTE — TELEPHONE ENCOUNTER
Noted. Yes ok to offer res 24 apt slot for 7/9    Ok to offer Dexcom G7 sample.       New rx for lantus, humalog and dexcom G7 sent to [facility] pharmacy.     Patient ask patient to notify us if she is having issues with picking up these prescriptions.         Thank you!

## 2024-07-03 NOTE — TELEPHONE ENCOUNTER
Called pt and scheduled her for Res24 7/9. Dexcom G7 sample set aside in [place] location. Pt states she will  today as she just got out of work. Also advised pt that all medications have been sent to [facility] pharmacy as requested. Advised pt to  meds asap and to call office if unable to get meds. Pt verbalized understanding.     Called [facility] pharmacy to ensure that all scripts were received. Was notified that per insurance meds were changed to Basaglar, Novolog and 32G pen needles. Dexcom requires PA. All scripts besides sensors will be available for  today.     Basaglar is covered, $15   Novolog is covered, $15     Tried to submit PA in [employer]: Sorry but [employer] cannot process this PA request electronically. Please refer to the original instructions from the PBM for information on how to process this prior authorization manually.    PA submitted via Covermymeds  Key: [id number]  Message: Please advise the dispensing pharmacy to contact the Pharmacy Help Line at [phone] for assistance.    Called pharmacy to notify to call number and to call back if there are any issues.

## 2024-07-03 NOTE — TELEPHONE ENCOUNTER
Called pt and was notified that she now has insurance and must use [facility] pharmacy. Her PCP sent rx for Lispro but insurance does not cover generic, they cover Humalog.     Pt has not been on insulin for over one week since she ran out and had to wait until insurance kicked in 7/1.   Previous med regimen:   Levemir 85 units AM   Lispro 24/20/20 and before bed 10 units     Pt states she has not been able to check BG since she does not have any test strips. It has been about one month since she last checked.   BG have been ranging 300-400  Pt has had symptoms of dizziness and nausea but denies symptoms at this time.     Pt wants to start Dexcom G7 again, okay to offer sample? Pt is scheduled for 9/16, okay to offer Res24 7/9 1:45 pm?     Pt has OneTouch Verio, test strips sent per protocol.     Endocrine Refill protocol for Glucose testing supplies       Protocol Criteria: PASSED  Appointment with Endocrinology completed in the last 12 months or scheduled in the next 6 months     Verify appointment has been completed or scheduled in the appropriate timeline. If so can send a 90 day supply with 1 refill.     Last completed office visit: 7/2/2024 Ashok Osborne DO   Next scheduled Follow up:   Future Appointments   Date Time Provider Department Center   8/10/2024  2:20 PM [facility] [place]   9/16/2024 10:45 AM rTicia Wu APRN [facility]

## 2024-07-04 DIAGNOSIS — R10.9 RIGHT FLANK PAIN: Primary | ICD-10-CM

## 2024-07-04 DIAGNOSIS — K59.09 OTHER CONSTIPATION: ICD-10-CM

## 2024-07-04 RX ORDER — POLYETHYLENE GLYCOL 3350 17 G/17G
17 POWDER, FOR SOLUTION ORAL DAILY
Qty: 507 G | Refills: 1 | Status: SHIPPED | OUTPATIENT
Start: 2024-07-04

## 2024-07-09 ENCOUNTER — OFFICE VISIT (OUTPATIENT)
Dept: ENDOCRINOLOGY CLINIC | Facility: CLINIC | Age: 49
End: 2024-07-09

## 2024-07-09 VITALS
BODY MASS INDEX: 42.41 KG/M2 | WEIGHT: 216 LBS | HEIGHT: 60 IN | DIASTOLIC BLOOD PRESSURE: 70 MMHG | SYSTOLIC BLOOD PRESSURE: 124 MMHG | HEART RATE: 83 BPM

## 2024-07-09 DIAGNOSIS — E11.319 TYPE 2 DIABETES MELLITUS WITH RETINOPATHY WITHOUT MACULAR EDEMA, WITH LONG-TERM CURRENT USE OF INSULIN, UNSPECIFIED LATERALITY, UNSPECIFIED RETINOPATHY SEVERITY (HCC): Primary | ICD-10-CM

## 2024-07-09 DIAGNOSIS — Z79.4 TYPE 2 DIABETES MELLITUS WITH RETINOPATHY WITHOUT MACULAR EDEMA, WITH LONG-TERM CURRENT USE OF INSULIN, UNSPECIFIED LATERALITY, UNSPECIFIED RETINOPATHY SEVERITY (HCC): Primary | ICD-10-CM

## 2024-07-09 PROCEDURE — 99214 OFFICE O/P EST MOD 30 MIN: CPT | Performed by: NURSE PRACTITIONER

## 2024-07-09 PROCEDURE — 95251 CONT GLUC MNTR ANALYSIS I&R: CPT | Performed by: NURSE PRACTITIONER

## 2024-07-09 RX ORDER — INSULIN GLARGINE 100 [IU]/ML
80 INJECTION, SOLUTION SUBCUTANEOUS NIGHTLY
Qty: 72 ML | Refills: 0 | Status: SHIPPED | OUTPATIENT
Start: 2024-07-09 | End: 2024-10-07

## 2024-07-09 RX ORDER — INSULIN LISPRO 100 [IU]/ML
INJECTION, SOLUTION INTRAVENOUS; SUBCUTANEOUS
Qty: 70 ML | Refills: 0 | Status: SHIPPED | OUTPATIENT
Start: 2024-07-09

## 2024-07-09 RX ORDER — DULAGLUTIDE 1.5 MG/.5ML
1.5 INJECTION, SOLUTION SUBCUTANEOUS WEEKLY
Qty: 2 ML | Refills: 0 | Status: SHIPPED | OUTPATIENT
Start: 2024-07-09

## 2024-07-09 RX ORDER — ACYCLOVIR 400 MG/1
1 TABLET ORAL
Qty: 9 EACH | Refills: 1 | Status: SHIPPED | OUTPATIENT
Start: 2024-07-09

## 2024-07-09 RX ORDER — DULAGLUTIDE 3 MG/.5ML
3 INJECTION, SOLUTION SUBCUTANEOUS WEEKLY
Qty: 2 ML | Refills: 0 | Status: SHIPPED | OUTPATIENT
Start: 2024-07-23

## 2024-07-09 RX ORDER — PEN NEEDLE, DIABETIC 30 GX3/16"
1 NEEDLE, DISPOSABLE MISCELLANEOUS 4 TIMES DAILY
Qty: 400 EACH | Refills: 1 | Status: SHIPPED | OUTPATIENT
Start: 2024-07-09

## 2024-07-09 NOTE — PATIENT INSTRUCTIONS
A1C: 7,5 % 4/6/2024  Glucosa en abbi: 154 en la clínica hoy    Medicamentos:   - comience con Trulicity 1,5 mg betzaida vez a la semana alona 4 semanas    - semana 5: aumente Trulicity 3 mg betzaida vez a la semana   - continuar con Basaglar 80 unidades cada noche   -Aumentar Novolog    24 unidades con desayuno   20 --> 24 unidades con almuerzo   20 --> 30 unidades con jayde     Por favor, dame betzaida actualización sobre tus lecturas de glucosa en abbi en 1 semana.     Peso:  Lecturas de peso de los últimos 6 encuentros:  09/07/24 216 libras (98 kg)  02/07/24 213 libras (96,6 kg)  27/11/23 207 libras (93,9 kg)  23/08/23 205 libras (93 kg)  11/07/23 208 libras (94,3 kg)  21/06/23 206 libras (93,4 kg)    Objetivo A1C:  <7,0%    Pruebas de azúcar en abbi:  Continuar usando el glucómetro continuo Dexcom G7     Objetivos de azúcar en abbi:  Antes del desayuno:  (preferiblemente < 110)  Antes de las comidas O 2 horas después de las comidas: <180 (preferiblemente <150)     Llame para niveles de azúcar en abbi persistentes < 75 o > 200

## 2024-07-09 NOTE — PROGRESS NOTES
Name: Rivka Anders  Date: 7/9/2024    CHIEF COMPLAINT   Chief Complaint   Patient presents with    Diabetes     Rocio #[id number]       HISTORY OF PRESENT ILLNESS   Rivka Anders is a 49 year old female who presents for follow up on diabetes management.   HbA1C: 7.5% on 6/4/2024. Previously was 7.2% on 11/27/2023.   Blood glucose is: 154 in clinic today.  Patient notes that she has been feeling well. She notes that since 4/2024, she had lost her health insurance, thus had a hard time getting her diabetes medications.     FAMILY HISTORY OF DIABETES  -grandfather; uncle and aunt   DIABETES HISTORY  Diagnosed: >20 years ago   Prior HbA, C or glycohemoglobin were 8.2% 12/2015; 8.0% 3/2016; 9.2% 12/2016; 9.5% 3/2017; 11.1% 7/2017; >14% 2/2018; 13.6% 4/2018; 11.5% 7/2018; 10.9% 10/2018; 7.6% 10/2019; 7.2% 1/2020; 7.0% 11/2020; 7.6% 4/2021; 7.8% 6/2021; 7.8% 9/1/21; 7.0% 11/3/2021; 7.7% 2/9/2022; 7.9% 4/27/2022; 7.3% 9/19/2022; 8.0% 12/19/2022; 7.3% 3/20/2023; 7.2% 6/21/23; 7.3% 8/23/2023; 7.2% 11/2023; 7.5% at POC today;     PREVIOUS MEDICATION FOR DM:  Victoza -d/c'ed due to lack of effectiveness   Trulicity- d/c'ed due to lack of effectiveness   -Rybelsus- d/c'ed due to lack of effectiveness   -Basaglar- d/c'ed due to lack of effectiveness   -Humalog -d/c'ed due to no longer insurance preferred brand.   - Fiasp not covered by her insurance   - will avoid sglt-2 medications due to increased frequency of UTI  - ozempic-d/c'ed due to lack of glycemic control   - Metformin -d/c'ed due to lack of glycemic control     CURRENT MEDICATIONS FOR DM:  - Mounjaro 10mg weekly - has not taken since 4/2024 due to loss of health insurance     -Basaglar 80 units nightly     -Humalog 24 units with breakfast        20 units with lunch        20 units with dinner          10 units with late night snack      HOME GLUCOSE READINGS:   Personal Dexcom G7 CGM download reviewed with patient. The results revealed (6/26 to 7/9):     Average glucose:  217mg/dl     In target range: (70-180mg/dl): 34%     High glucose targets: (> 180mg/dl): 34%     Very high glucose targets: (> 250mg/dl): 32%     Low glucose targets: (less than 70mg/dl): 0%     Very Low glucose targets: (< 54mg/dl ): 0%     Glucose Management Indicator (GMI): n/a  Glucose Variability: 68mg/dL    Continuous Glucose Monitoring Interpretation    Rivka Anders has undergone continuous glucose monitoring with the personal Dexcom G7 continuous glucose monitor. The blood glucose tracings were evaluated for two weeks prior to office visit.  Her blood glucose tracings demonstrated postprandial hyperglycemia most often occurring with lunch/dinner meals. She did not experience any hypoglycemia during the week of evaluation.     HISTORY OF DIABETES COMPLICATIONS:  History of Retinopathy: yes - BDR s/p laser therapy - last eye exam within the last 12 months: yes  History of Neuropathy: yes- tried lyrica but didn't tolerate due to dizziness.   History of Nephropathy: no     ASSOCIATED COMPLICATIONS:   HTN: yes  Hyperlipidemia: yes   Coronary Artery Disease: no   Cerebrovascular Disease: no  Peripheral Vascular Disease: no      DIETARY COMPLIANCE:  Fair; trying to follow a low carb diet     EXERCISE:   No     Polyuria, polyphagia, polydipsia: no  Paresthesias: no   Blurred vision: no   Recent steroids, illness or infections: no     REVIEW OF SYSTEMS  Constitutional: Negative for: weight change, fever, fatigue, cold/heat intolerance  Eyes: Negative for:  Visual changes, proptosis, blurring  ENT: Negative for:  dysphagia, neck swelling, dysphonia  Respiratory: Negative for: hemoptysis, shortness of breath, cough, or dyspnea.  Cardiovascular: Negative for:  chest pain, chest discomfort, palpitations  GI: Negative for:  abdominal pain, nausea, vomiting, diarrhea, heartburn, constipation  Neurology: Negative for: headache, dizziness, syncope, numbness/tingling, or weakness.   Genito-Urinary: Negative for: dysuria,  frequency or hematuria   Hematology/Lymphatics: Negative for: bruising, easy bleeding, lower extremity edema  Endocrine: Negative for: polyuria, polydipsia. No thyroid disease. No osteoporosis.   Skin: Negative for: rash, blister, infection or ulcers.    MEDICATIONS:     Current Outpatient Medications:     polyethylene glycol, PEG 3350, (MIRALAX) 17 GM/SCOOP Oral Powder, Take 17 g by mouth daily. 1 scoop in juice, water or milk daily as needed for constipation., Disp: 507 g, Rfl: 1    Insulin Lispro, 1 Unit Dial, (HUMALOG KWIKPEN) 100 UNIT/ML Subcutaneous Solution Pen-injector, Inject 24 units with breakfast, 20 units with lunch, 20 units with diner and 10 units with snack. Max daily dose 74 units., Disp: 67 mL, Rfl: 0    Insulin Pen Needle 33G X 4 MM Does not apply Misc, 1 each 5 (five) times daily., Disp: 500 each, Rfl: 1    Continuous Glucose Sensor (DEXCOM G7 SENSOR) Does not apply Misc, 1 each Every 10 days. Use as directed every 10 days, Disp: 9 each, Rfl: 1    Glucose Blood (ONETOUCH VERIO) In Vitro Strip, Check blood sugar 3 times daily., Disp: 300 strip, Rfl: 1    phenazopyridine 100 MG Oral Tab, Take 1 tablet (100 mg total) by mouth 3 (three) times daily with meals., Disp: , Rfl:     Insulin Lispro, 1 Unit Dial, 100 UNIT/ML Subcutaneous Solution Pen-injector, Inject 24 units with breakfast, 20 units with lunch, 20 units with dinner and 10 units with snack. Max daily dose 74 units., Disp: 66 mL, Rfl: 0    atorvastatin 40 MG Oral Tab, Take 1 tablet (40 mg total) by mouth nightly., Disp: 90 tablet, Rfl: 0    Insulin Pen Needle (PEN NEEDLES) 32G X 4 MM Does not apply Misc, Inject 1 each into the skin in the morning and 1 each before bedtime., Disp: 200 each, Rfl: 1    losartan-hydroCHLOROthiazide 50-12.5 MG Oral Tab, Take 1 tablet by mouth daily., Disp: 90 tablet, Rfl: 1    TRUEPLUS PEN NEEDLES 32G X 4 MM Does not apply Misc, USE TO INJECT UP TO ADRIENNE VECES AL SAROJ, Disp: 300 each, Rfl: 2    Glucose Blood  (ONETOUCH VERIO) In Vitro Strip, CHECK BLOOD SUGAR 4 TIMES PER DAY, Disp: 400 strip, Rfl: 0    Lancets (ONETOUCH DELICA PLUS [id number]) Does not apply Misc, 1 strip by In Vitro route 2 (two) times daily. Check 4 times per day, Disp: 400 each, Rfl: 1    Glucose Blood (CONTOUR NEXT TEST) In Vitro Strip, Check 2 times per day, Disp: 200 strip, Rfl: 3    Glucose Blood In Vitro Strip, Check BS twice daily, Disp: 100 strip, Rfl: 11    Insulin Pen Needle 32G X 6 MM Does not apply Misc, use with insulin pen as directed, Disp: , Rfl:     insulin glargine (LANTUS SOLOSTAR) 100 UNIT/ML Subcutaneous Solution Pen-injector, Inject 85 Units into the skin daily. (Patient not taking: Reported on 2024), Disp: 76.5 mL, Rfl: 0    BD PEN NEEDLE HOSSEIN U/F 32G X 4 MM Does not apply Misc, USE UTD (Patient not taking: Reported on 2024), Disp: 500 each, Rfl: 1    Continuous Blood Gluc Sensor (DEXCOM G7 SENSOR) Does not apply Misc, 1 each Every 10 days. (Patient not taking: Reported on 2024), Disp: 9 each, Rfl: 1    Blood Glucose Monitoring Suppl (TRUE METRIX METER) w/Device Does not apply Kit, 1 kit by Does not apply route daily. (Patient not taking: Reported on 2024), Disp: 1 kit, Rfl: 0    Blood Glucose Monitoring Suppl (TIAGO CONTOUR NEXT EZ) w/Device Does not apply Kit, 1 Device by Does not apply route daily. (Patient not taking: Reported on 2024), Disp: 1 kit, Rfl: 0    ALLERGIES:   No Known Allergies    SOCIAL HISTORY:   Social History     Socioeconomic History    Marital status: Single   Tobacco Use    Smoking status: Never    Smokeless tobacco: Never   Vaping Use    Vaping status: Never Used   Substance and Sexual Activity    Alcohol use: No    Drug use: No       PAST MEDICAL HISTORY:   Past Medical History:    Amblyopia    OD    Diabetes (HCC)    Hyperlipidemia    Pregnancy (HCC)    12 hr labor [facility], [facility], 33 1.2 week, 6 lb(s) Male / [place] ([facility]) Baby sent to [place] for 1 month  due to breathing issues    Pregnancy ([facility])    8 hr labor  ; [facility]; 36 week 7 lb(s) Male; [place] ([facility]) baby had problems breathing and remained in hospital for 2 weeks    Pregnancy ([facility])    spontaneous , unknown sex, SAB at 6 weeks no D&C    Pregnancy ([facility])    D&C 2009    Type II diabetes mellitus ([facility])    Unspecified essential hypertension    stopped medication     Urinary incontinence    surgical       PAST SURGICAL HISTORY:   Past Surgical History:   Procedure Laterality Date    Injection (ia)      injection tendon sheath, ligament ([facility] Campos) (x2)    Retinal laser procedure Left     [facility]       PHYSICAL EXAM:   Vitals:    24 1332   BP: 124/70   Pulse: 83   Weight: 216 lb (98 kg)   Height: 5' (1.524 m)       BMI:   Body mass index is 42.18 kg/m².      General Appearance:  alert, well developed, in no acute distress  Nutritional:  no extreme weight gain or loss  Head: Atraumatic  Eyes:  normal conjunctivae, sclera., normal sclera and normal pupils  Throat/Neck: normal sound to voice. Normal hearing, normal speech  Back: no kyphosis  Respiratory:  Speaking in full sentences, non-labored. no increased work of breathing, no audible wheezing    Skin:  normal moisture and skin texture, no visible lesions  Hair and nails: normal scalp hair  Hematologic:  no excessive bruising  Neuro: motor grossly intact, moving all extremities without difficulty  Psychiatric:  oriented to time, self, and place  Extremities: no lesions;     Diabetic Foot Exam:  Bilateral barefoot skin diabetic exam is normal, visualized feet and the appearance is normal.  Bilateral monofilament/sensation of both feet is normal.  Pulsation pedal pulse exam of both lower legs/feet is normal as well.    LABS: Pertinent labs reviewed    ASSESSMENT/PLAN:    -Reviewed with patient the pathogenesis of diabetes, clinical significance of A1c, and common complications such as:  microvascular, macrovascular and diabetic ketoacidosis. Patient verbalizes understanding of the importance of glycemic control and the goals of therapy.   -Discussed with patient glucose targets ranges (Fasting  and post prandial <180).     1.Type 2 Diabetes Mellitus, uncontrolled, with hyperglycemia with long term insulin use   -LAB DATA  HbA,C: 7.5% on 2024   a) Medications  - start with Trulicity 1.5 mg once a week for 4 weeks, then increase Trulicity 3 mg once a weekly - Risks and benefits reviewed. Verbalizes understanding.  - continue with Basaglar 80 units every night   -Increase Novolog    24 units with breakfast   20 --> 24 units with lunch   20 --> 30 units with dinner    -discussed to continue to low carb diet    -discussed to increase exercise/aerobic exercises to at least 150 mins per week  - discussed that she gives me an update on her BG readings in 1 week  -reviewed target goal BG readings and A1C  -reviewed when to call with abnormal BG readings to notify me    b) Nephropathy: GFR: 112 on 2024 and urine MA: 19 on 2024  c) UTD with optho   d) Foot exam: normal today 2024  E) cont. using Dexcom G7 CGM. New rx for refills sent to pharmacy today  f) Life style changes reviewed.     2. Blood Pressure Management   -on losartan-hydrochlorothiazide 50-12.5mg once daily   -normotensive today     3.Hyperlipidemia   -LDL: 164 and Tri on 2024  -on atorvastatin 40mg at bedtime      RTC in 2-3 months   Patient instructed to call sooner if they develop Blood glucose readings <75 and/or if they have readings persistently >200.     The risks and benefits of my recommendations were discussed with the patient today. questions were also answered to the best of my knowledge. Patient verbalizes understanding of these issues and agrees to the plan.     2024  JOSS Martínez

## 2024-07-11 ENCOUNTER — TELEPHONE (OUTPATIENT)
Dept: ENDOCRINOLOGY CLINIC | Facility: CLINIC | Age: 49
End: 2024-07-11

## 2024-07-11 DIAGNOSIS — Z79.4 TYPE 2 DIABETES MELLITUS WITH RETINOPATHY WITHOUT MACULAR EDEMA, WITH LONG-TERM CURRENT USE OF INSULIN, UNSPECIFIED LATERALITY, UNSPECIFIED RETINOPATHY SEVERITY (HCC): Primary | ICD-10-CM

## 2024-07-11 DIAGNOSIS — E11.319 TYPE 2 DIABETES MELLITUS WITH RETINOPATHY WITHOUT MACULAR EDEMA, WITH LONG-TERM CURRENT USE OF INSULIN, UNSPECIFIED LATERALITY, UNSPECIFIED RETINOPATHY SEVERITY (HCC): Primary | ICD-10-CM

## 2024-07-11 NOTE — TELEPHONE ENCOUNTER
Missing/Illegible informaion on RX    Clarification:  Please send an ialternative , on Backorder.    Current Outpatient Medications   Medication Sig Dispense Refill           [START ON 7/23/2024] Dulaglutide (TRULICITY) 3 MG/0.5ML Subcutaneous Solution Pen-injector Inject 3 mg into the skin once a week. 2 mL 0

## 2024-07-11 NOTE — TELEPHONE ENCOUNTER
New Prescription/Alternative requested      Not Listed: Humalog 100 Unit/ML Kwikpen    SIG: Inject 24 units with breakfast,24 units with Lunch, 30 units with Dine.  Max daily dose 78 unts.    Qty: 75.0      Current Outpatient Medications   Medication Sig Dispense Refill

## 2024-07-12 NOTE — TELEPHONE ENCOUNTER
Per message from pharmacy below, they are unable to order in trulicity 3 mg dose. This is a future order due to be dispensed on 7/23/24.     Per recent OV note; - start with Trulicity 1.5 mg once a week for 4 weeks, then increase Trulicity 3 mg once a weekly - Risks and benefits reviewed. Verbalizes understanding.

## 2024-07-17 ENCOUNTER — MED REC SCAN ONLY (OUTPATIENT)
Dept: ENDOCRINOLOGY CLINIC | Facility: CLINIC | Age: 49
End: 2024-07-17

## 2024-07-17 RX ORDER — BLOOD SUGAR DIAGNOSTIC
STRIP MISCELLANEOUS
Qty: 400 STRIP | Refills: 1 | Status: SHIPPED | OUTPATIENT
Start: 2024-07-17

## 2024-07-17 RX ORDER — INSULIN ASPART 100 [IU]/ML
INJECTION, SOLUTION INTRAVENOUS; SUBCUTANEOUS
Qty: 70 ML | Refills: 0 | Status: SHIPPED | OUTPATIENT
Start: 2024-07-17

## 2024-07-17 NOTE — TELEPHONE ENCOUNTER
Per patient her insurance does not cover trulicity. Per patient the pharmacist said that rybelsus is not covered either or dexcom G7 sensor.     Requesting testing strips to be send to [facility]    Called [facility] at [phone], was transferred to pharmacy department, spoke to Breana, trulicity is covered but need to be on metformin first. Per LOV metformin tried and failed due to poor glycemic control.     Rep guided RN through process of downloading PA form for [facility].    Will submit PA form for Trulicity and dexcom G7 sensor.      PA form completed for Trulicity 1.5 mg and Dexcom G7 sensors. LOV notes printed. PA placed in providers folder for signature and faxing.    Called patient again to give her an update. Patient understands that provider is at a different location today but will sign and tomorrow.      Passed for strips - Endocrine Refill protocol for Glucose testing supplies       Protocol Criteria:  Appointment with Endocrinology completed in the last 12 months or scheduled in the next 6 months     Verify appointment has been completed or scheduled in the appropriate timeline. If so can send a 90 day supply with 1 refill.        Last completed office visit: 7/9/2024 Tricia Wu APRN   Next scheduled Follow up:   10/9/2024  8:00 AM Tricia Wu APRN [facility]

## 2024-07-17 NOTE — TELEPHONE ENCOUNTER
Sent Rx for Novolog pen per protocol due to insurance preference. Called patient to notify her.    Endocrine refill protocol for rapid acting, regular, intermediate, and mixed insulin:    Protocol Criteria:  Appointment with Endocrinology completed in the last 6 months or scheduled in the next 3 months     Verify appointment has been completed or scheduled in the appropriate timeline. If so can send a 90 day supply with 1 refill.   Verify A1C has been completed in the last 6 months and is below 8.5%    -May substitute prescriptions for Novolog and Humalog unless documented allergy (pens and vials) at the same dose and concentration per insurance preference and provider protocol.   -May substitute prescriptions for Novolin R and Humulin R unless documented allergy (pens and vials) at the same dose and concentration per insurance preference and provider protocol.   -May substitute prescriptions for Novolin N and Humulin N unless documented allergy (pens and vials) at the same dose and concentration per insurance preference and provider protocol.   -May substitute prescriptions for Humulin and Novolin 70/30 insulin unless documented allergy at the same dose and concentration per insurance preference and provider protocol.    Last completed office visit: 7/9/2024 Tricia Wu APRN   Next scheduled Follow up:   Future Appointments   Date Time Provider Department Center   8/10/2024  2:20 PM [facility] [place]   10/9/2024  8:00 AM Tricia Wu APRN [facility]      Last A1C result: 7.2% done 11/27/2023.

## 2024-07-19 ENCOUNTER — TELEPHONE (OUTPATIENT)
Dept: ENDOCRINOLOGY CLINIC | Facility: CLINIC | Age: 49
End: 2024-07-19

## 2024-07-19 RX ORDER — CALCIUM CITRATE/VITAMIN D3 200MG-6.25
1 TABLET ORAL 4 TIMES DAILY
Qty: 400 STRIP | Refills: 1 | Status: SHIPPED | OUTPATIENT
Start: 2024-07-19

## 2024-07-19 NOTE — TELEPHONE ENCOUNTER
Spoke with pharmacist pt needs a PA for her Dexcom G7    Medication  CGM  pump supply Requested: Dexcom G7 Sensor                                               Si each Every 10 days.     Qty. 9    Rfl:1    6 month supply     DX Code:E11.9                                        Case Number/Pending Ref#:       Information has been entered and forwarded to our clinical staff to assist in the PA. Thanks

## 2024-07-19 NOTE — TELEPHONE ENCOUNTER
Spoke with pharmacist Arom she states pt needs new script for Truemetrix meter and test scripts this is what's covered by her insurance. Refill request pending     Endocrine Refill protocol for Glucose testing supplies       Protocol Criteria:PASSED    Appointment with Endocrinology completed in the last 12 months or scheduled in the next 6 months     Verify appointment has been completed or scheduled in the appropriate timeline. If so can send a 90 day supply with 1 refill.        Last completed office visit: 7/9/2024 Tricia Wu APRN   Next scheduled Follow up:   Future Appointments   Date Time Provider Department Center   8/10/2024  2:20 PM [facility] [place]   10/9/2024  8:00 AM Tricia Wu APRN [facility] [place]

## 2024-07-19 NOTE — TELEPHONE ENCOUNTER
Current Outpatient Medications   Medication Sig Dispense Refill    Glucose Blood (ONETOUCH VERIO) In Vitro Strip CHECK BLOOD SUGAR 4 TIMES PER  strip 1                                                                                 Alternative Requested  Pharmacy Comments: This brend not covered,can this be changed to covered alternative ? Truemetrix

## 2024-07-23 NOTE — TELEPHONE ENCOUNTER
Received fax from [employer] 07/20/2024 for PA request for medication Trulicity 1.5/0.5 Injector was approved from 07/20/2024 through 07/20/2025

## 2024-07-23 NOTE — TELEPHONE ENCOUNTER
Medication PA Requested: (DEXCOM G7 SENSOR                                                           CoverMyMeds Used:  Key:  Quantity: 9 each   Day Supply: 90  Si each Every 10 days.   DX Code:      E11.319

## 2024-08-05 NOTE — TELEPHONE ENCOUNTER
Patient came in stating that she needed a refill for the following medication. She states when she goes to the pharmacy they decline having a script. Please advise         insulin glargine (BASAGLAR KWIKPEN) 100 UNIT/ML Subcutaneous Solution Pen-injector, Inject 80 Units into the skin nightly., Disp: 72 mL, Rfl: 0

## 2024-08-06 RX ORDER — INSULIN GLARGINE 100 [IU]/ML
80 INJECTION, SOLUTION SUBCUTANEOUS NIGHTLY
Qty: 72 ML | Refills: 0 | Status: SHIPPED | OUTPATIENT
Start: 2024-08-06 | End: 2024-11-04

## 2024-08-06 RX ORDER — INSULIN GLARGINE 100 [IU]/ML
80 INJECTION, SOLUTION SUBCUTANEOUS NIGHTLY
Qty: 72 ML | Refills: 0 | Status: SHIPPED | OUTPATIENT
Start: 2024-08-06 | End: 2024-08-06

## 2024-08-06 NOTE — TELEPHONE ENCOUNTER
Update noted. Pended rx signed. Seems that her glucose patterns have improved and are stable now. Ok to continue with current medication regimen.       Thank you

## 2024-08-06 NOTE — TELEPHONE ENCOUNTER
Update noted. [facility]. Please call pharmacy to confirm that new rx was received and patient will be able to .     Thank you

## 2024-08-06 NOTE — TELEPHONE ENCOUNTER
Per note below, called patients [facility] at [phone] and spoke to pharmacy staff. They informed me that they have received the Rx for the Basaglar however they are soon to close their company and will no longer run as a pharmacy and to call the patient to confirm what pharmacy they would like insulin to be sent to. Called and spoke to patient, she would like Basaglar to be sent to the [facility] located in [facility]. While on the phone with patient she had informed me that she was able to start her Trulicity and Dexcom this Monday. Patient reports before starting the Trulicity she has made  improvements in her diet and has seen improvement in her sugar readings. Patient reports the highest her sugar has been is 200 but usually they range from 110, 126, 130, and 170's and is very content. Patient stated that she was connected via Dexcom Clarity but there was a technical issue as patients reports don't show via [facility] portal. Patient is not well with technology and advised patient we are able to assist in reconnecting via our portal and can stop by in office for assistance or can be helped during office visit. Patient verbalized understanding and will try stop by throughout the week for assistance so that data is ready prior to appointment.

## 2024-08-10 ENCOUNTER — HOSPITAL ENCOUNTER (OUTPATIENT)
Dept: MAMMOGRAPHY | Age: 49
Discharge: HOME | End: 2024-08-10
Attending: FAMILY MEDICINE
Payer: MEDICAID

## 2024-08-10 DIAGNOSIS — Z12.31 VISIT FOR SCREENING MAMMOGRAM: ICD-10-CM

## 2024-08-10 PROCEDURE — 77063 BREAST TOMOSYNTHESIS BI: CPT | Performed by: FAMILY MEDICINE

## 2024-08-10 PROCEDURE — 77067 SCR MAMMO BI INCL CAD: CPT | Performed by: FAMILY MEDICINE

## 2024-09-28 NOTE — TELEPHONE ENCOUNTER
Endocrine Refill protocol for oral and injectable diabetic medications    Protocol Criteria:  PASSED      If all below requirements are met, send a 90-day supply with 1 refill per provider protocol.    Verify appointment with Endocrinology completed in the last 6 months or scheduled in the next 3 months.  Verify A1C has been completed within the last 6 months and is below 8.5%     Last completed office visit: 7/9/2024 Tricia Wu APRN   Next scheduled Follow up:   Future Appointments   Date Time Provider Department Center   10/9/2024  8:00 AM Tricia Wu APRN [facility]      Last A1c result: Last A1c value was 7.2% done 11/27/2023.

## 2024-09-30 RX ORDER — DULAGLUTIDE 3 MG/.5ML
INJECTION, SOLUTION SUBCUTANEOUS
Qty: 6 ML | Refills: 0 | Status: SHIPPED | OUTPATIENT
Start: 2024-09-30

## 2024-10-09 ENCOUNTER — OFFICE VISIT (OUTPATIENT)
Dept: ENDOCRINOLOGY CLINIC | Facility: CLINIC | Age: 49
End: 2024-10-09

## 2024-10-09 VITALS
BODY MASS INDEX: 42 KG/M2 | WEIGHT: 217.19 LBS | DIASTOLIC BLOOD PRESSURE: 78 MMHG | SYSTOLIC BLOOD PRESSURE: 131 MMHG | HEART RATE: 84 BPM

## 2024-10-09 DIAGNOSIS — E11.65 UNCONTROLLED TYPE 2 DIABETES MELLITUS WITH HYPERGLYCEMIA, WITH LONG-TERM CURRENT USE OF INSULIN (HCC): Primary | ICD-10-CM

## 2024-10-09 DIAGNOSIS — Z79.4 UNCONTROLLED TYPE 2 DIABETES MELLITUS WITH HYPERGLYCEMIA, WITH LONG-TERM CURRENT USE OF INSULIN (HCC): Primary | ICD-10-CM

## 2024-10-09 LAB
GLUCOSE BLOOD: 151
HEMOGLOBIN A1C: 7.2 % (ref 4.3–5.6)
TEST STRIP LOT #: NORMAL NUMERIC

## 2024-10-09 PROCEDURE — 99214 OFFICE O/P EST MOD 30 MIN: CPT | Performed by: NURSE PRACTITIONER

## 2024-10-09 PROCEDURE — 82947 ASSAY GLUCOSE BLOOD QUANT: CPT | Performed by: NURSE PRACTITIONER

## 2024-10-09 PROCEDURE — 95251 CONT GLUC MNTR ANALYSIS I&R: CPT | Performed by: NURSE PRACTITIONER

## 2024-10-09 PROCEDURE — 83036 HEMOGLOBIN GLYCOSYLATED A1C: CPT | Performed by: NURSE PRACTITIONER

## 2024-10-09 RX ORDER — INSULIN GLARGINE 100 [IU]/ML
84 INJECTION, SOLUTION SUBCUTANEOUS NIGHTLY
Qty: 75.6 ML | Refills: 0 | Status: SHIPPED | OUTPATIENT
Start: 2024-10-09 | End: 2025-01-07

## 2024-10-09 RX ORDER — PEN NEEDLE, DIABETIC 30 GX3/16"
1 NEEDLE, DISPOSABLE MISCELLANEOUS 4 TIMES DAILY
Qty: 400 EACH | Refills: 1 | Status: SHIPPED | OUTPATIENT
Start: 2024-10-09

## 2024-10-09 RX ORDER — INSULIN GLARGINE 100 [IU]/ML
80 INJECTION, SOLUTION SUBCUTANEOUS NIGHTLY
Qty: 72 ML | Refills: 0 | Status: SHIPPED | OUTPATIENT
Start: 2024-10-09 | End: 2024-10-09

## 2024-10-09 RX ORDER — ACYCLOVIR 400 MG/1
1 TABLET ORAL
Qty: 9 EACH | Refills: 1 | Status: SHIPPED | OUTPATIENT
Start: 2024-10-09

## 2024-10-09 RX ORDER — INSULIN ASPART 100 [IU]/ML
INJECTION, SOLUTION INTRAVENOUS; SUBCUTANEOUS
Qty: 90 ML | Refills: 0 | Status: SHIPPED | OUTPATIENT
Start: 2024-10-09

## 2024-10-09 RX ORDER — DULAGLUTIDE 4.5 MG/.5ML
4.5 INJECTION, SOLUTION SUBCUTANEOUS WEEKLY
Qty: 6 ML | Refills: 1 | Status: SHIPPED | OUTPATIENT
Start: 2024-10-09

## 2024-10-09 NOTE — PATIENT INSTRUCTIONS
A1C: 7.2% today --> previously was 7.5% on 6/4/2024  Blood glucose: 151 in clinic today    Medications:   - increase Trulicity 3 --> 4.5 mg once a weekly   - continue with Basaglar 84 units every night   -Novolog    30 units with breakfast + sliding scale    24 units with lunch + sliding scale    24 units with dinner + sliding scale    10 unit with snack + sliding scale     If your blood glucose reading is 151- 200= +2 units       201-250= +4 units      251-300= +5 units          Weight:  Wt Readings from Last 6 Encounters:   10/09/24 217 lb 3.2 oz (98.5 kg)   07/09/24 216 lb (98 kg)   07/02/24 213 lb (96.6 kg)   11/27/23 207 lb (93.9 kg)   08/23/23 205 lb (93 kg)   07/11/23 208 lb (94.3 kg)     A1C goal:  <7.0%    Blood sugar testing:  Continue to use Dexcom G7 continuous glucometer     Blood sugar targets:  Before breakfast:  (preferably < 110)  2 hours after meals: <180 (preferably <150)     Call for persistent blood sugars < 75 or > 200

## 2024-10-09 NOTE — PROGRESS NOTES
-----------------------------  Dexcom Clarity  -----------------------------  Rivka Anders  YOB: 1975  Generated at: Wed, Oct 9, 2024 7:35 AM CDT  Reporting period: Tue Sep 10, 2024 - Wed Oct 9, 2024  -----------------------------  Glucose Details  Average glucose: 163 mg/dL  Standard deviation: 35 mg/dL  GMI: 7.2%  -----------------------------  Time in Range  Very High: 1%  High: 29%  In Range: 70%  Low: 0%  Very Low: <1%  Target Range   mg/dL    -----------------------------  CGM Details  Sensor usage: 97%  Days with CGM data: 29/30

## 2024-10-09 NOTE — PROGRESS NOTES
Name: Rivka Anders  Date: 10/9/2024    CHIEF COMPLAINT   Chief Complaint   Patient presents with    Diabetes     Pt is here for follow up for diabetes and A1c check, pt needs refill of Trulicity        HISTORY OF PRESENT ILLNESS   Rivka Anders is a 49 year old female who presents for follow up on diabetes management.   HbA1C: 7.2% at POC today. Previously was 7.5% on 6/4/2024.   Blood glucose is: 151 in clinic today.  Patient notes that she has been feeling well and denies any health changes or taking new medications since last office visit. She continues to follow a low carb diet and staying active per usual. She has been compliant with taking all diabetes medications.     FAMILY HISTORY OF DIABETES  -grandfather; uncle and aunt   DIABETES HISTORY  Diagnosed: >20 years ago   Prior HbA, C or glycohemoglobin were 8.2% 12/2015; 8.0% 3/2016; 9.2% 12/2016; 9.5% 3/2017; 11.1% 7/2017; >14% 2/2018; 13.6% 4/2018; 11.5% 7/2018; 10.9% 10/2018; 7.6% 10/2019; 7.2% 1/2020; 7.0% 11/2020; 7.6% 4/2021; 7.8% 6/2021; 7.8% 9/1/21; 7.0% 11/3/2021; 7.7% 2/9/2022; 7.9% 4/27/2022; 7.3% 9/19/2022; 8.0% 12/19/2022; 7.3% 3/20/2023; 7.2% 6/21/23; 7.3% 8/23/2023; 7.2% 11/2023; 7.5% 6/4/2024; 7.2% at POC today;     PREVIOUS MEDICATION FOR DM:  Victoza -d/c'ed due to lack of effectiveness   -Rybelsus- d/c'ed due to lack of effectiveness   - will avoid sglt-2 medications due to increased frequency of UTI  - ozempic-d/c'ed due to lack of glycemic control   - Metformin -d/c'ed due to lack of glycemic control     CURRENT MEDICATIONS FOR DM:  - Trulicity 3mg once weekly - tolerating well; denies GI s/e     -Basaglar 80 units nightly -- has been taking 84 units nightly     -Humalog 24 units with breakfast -- has been taking 30 units        24 units with lunch        30 units with dinner -- has been taking 24 units           10 units with late night snack      HOME GLUCOSE READINGS:   Dexcom G7 CGM download reviewed     Generated at: Wed, Oct 9, 2024  7:35 AM CDT  Reporting period: Tue Sep 10, 2024 - Wed Oct 9, 2024  -----------------------------  Glucose Details  Average glucose: 163 mg/dL  Standard deviation: 35 mg/dL  GMI: 7.2%  -----------------------------  Time in Range  Very High: 1%  High: 29%  In Range: 70%  Low: 0%  Very Low: <1%  Target Range   mg/dL     -----------------------------  CGM Details  Sensor usage: 97%  Days with CGM data: 29/30         Continuous Glucose Monitoring Interpretation    Rivka Anders has undergone continuous glucose monitoring with the personal Dexcom G7 continuous glucose monitor. The blood glucose tracings were evaluated for two weeks prior to office visit.  Her blood glucose tracings demonstrated postprandial hyperglycemia. She did not experience any hypoglycemia during the week of evaluation.     HISTORY OF DIABETES COMPLICATIONS:  History of Retinopathy: yes - BDR s/p laser therapy - last eye exam within the last 12 months: yes  History of Neuropathy: yes- tried lyrica but didn't tolerate due to dizziness.   History of Nephropathy: no     ASSOCIATED COMPLICATIONS:   HTN: yes  Hyperlipidemia: yes   Coronary Artery Disease: no   Cerebrovascular Disease: no  Peripheral Vascular Disease: no      DIETARY COMPLIANCE:  Fair; trying to follow a low carb diet     EXERCISE:   No     Polyuria, polyphagia, polydipsia: no  Paresthesias: no   Blurred vision: no   Recent steroids, illness or infections: no     REVIEW OF SYSTEMS  Constitutional: Negative for: weight change, fever, fatigue, cold/heat intolerance  Eyes: Negative for:  Visual changes, proptosis, blurring  ENT: Negative for:  dysphagia, neck swelling, dysphonia  Respiratory: Negative for: hemoptysis, shortness of breath, cough, or dyspnea.  Cardiovascular: Negative for:  chest pain, chest discomfort, palpitations  GI: Negative for:  abdominal pain, nausea, vomiting, diarrhea, heartburn, constipation  Neurology: Negative for: headache, dizziness, syncope,  numbness/tingling, or weakness.   Genito-Urinary: Negative for: dysuria, frequency or hematuria   Hematology/Lymphatics: Negative for: bruising, easy bleeding, lower extremity edema  Endocrine: Negative for: polyuria, polydipsia. No thyroid disease. No osteoporosis.   Skin: Negative for: rash, blister, infection or ulcers.    MEDICATIONS:     Current Outpatient Medications:     TRULICITY 3 MG/0.5ML Subcutaneous Solution Pen-injector, INJECT 3 MG INTO THE SKIN ONE TIME PER WEEK, Disp: 6 mL, Rfl: 0    TRUE METRIX METER w/Device Does not apply Kit, USE AS DIRECTED, Disp: 1 kit, Rfl: 0    insulin glargine (BASAGLAR KWIKPEN) 100 UNIT/ML Subcutaneous Solution Pen-injector, Inject 80 Units into the skin nightly., Disp: 72 mL, Rfl: 0    Glucose Blood (TRUE METRIX BLOOD GLUCOSE TEST) In Vitro Strip, 1 each by In Vitro route in the morning, at noon, in the evening, and at bedtime., Disp: 400 strip, Rfl: 1    Glucose Blood (ONETOUCH VERIO) In Vitro Strip, CHECK BLOOD SUGAR 4 TIMES PER DAY, Disp: 400 strip, Rfl: 1    insulin aspart (NOVOLOG FLEXPEN) 100 Units/mL Subcutaneous Solution Pen-injector, Inject 24 units with breakfast, 24 units with lunch, 30 units with diner. Max daily dose 78 units., Disp: 70 mL, Rfl: 0    Insulin Lispro, 1 Unit Dial, (HUMALOG KWIKPEN) 100 UNIT/ML Subcutaneous Solution Pen-injector, Inject 24 units with breakfast, 24 units with lunch, 30 units with diner. Max daily dose 78 units., Disp: 70 mL, Rfl: 0    Insulin Pen Needle (PEN NEEDLES) 32G X 4 MM Does not apply Misc, Inject 1 each into the skin in the morning, at noon, in the evening, and at bedtime., Disp: 400 each, Rfl: 1    Continuous Glucose Sensor (DEXCOM G7 SENSOR) Does not apply Misc, 1 each Every 10 days., Disp: 9 each, Rfl: 1    polyethylene glycol, PEG 3350, (MIRALAX) 17 GM/SCOOP Oral Powder, Take 17 g by mouth daily. 1 scoop in juice, water or milk daily as needed for constipation., Disp: 507 g, Rfl: 1    Insulin Pen Needle 33G X 4 MM  Does not apply Misc, 1 each 5 (five) times daily., Disp: 500 each, Rfl: 1    Continuous Glucose Sensor (DEXCOM G7 SENSOR) Does not apply Misc, 1 each Every 10 days. Use as directed every 10 days, Disp: 9 each, Rfl: 1    Glucose Blood (ONETOUCH VERIO) In Vitro Strip, Check blood sugar 3 times daily., Disp: 300 strip, Rfl: 1    phenazopyridine 100 MG Oral Tab, Take 1 tablet (100 mg total) by mouth 3 (three) times daily with meals., Disp: , Rfl:     Insulin Lispro, 1 Unit Dial, 100 UNIT/ML Subcutaneous Solution Pen-injector, Inject 24 units with breakfast, 20 units with lunch, 20 units with dinner and 10 units with snack. Max daily dose 74 units., Disp: 66 mL, Rfl: 0    atorvastatin 40 MG Oral Tab, Take 1 tablet (40 mg total) by mouth nightly., Disp: 90 tablet, Rfl: 0    losartan-hydroCHLOROthiazide 50-12.5 MG Oral Tab, Take 1 tablet by mouth daily., Disp: 90 tablet, Rfl: 1    TRUEPLUS PEN NEEDLES 32G X 4 MM Does not apply Misc, USE TO INJECT UP TO ADRIENNE VECES AL SAROJ, Disp: 300 each, Rfl: 2    Lancets (ONETOUCH DELICA PLUS [id number]) Does not apply Misc, 1 strip by In Vitro route 2 (two) times daily. Check 4 times per day, Disp: 400 each, Rfl: 1    Glucose Blood (CONTOUR NEXT TEST) In Vitro Strip, Check 2 times per day, Disp: 200 strip, Rfl: 3    Blood Glucose Monitoring Suppl (TRUE METRIX METER) w/Device Does not apply Kit, 1 kit by Does not apply route daily. (Patient not taking: Reported on 7/9/2024), Disp: 1 kit, Rfl: 0    Glucose Blood In Vitro Strip, Check BS twice daily, Disp: 100 strip, Rfl: 11    Blood Glucose Monitoring Suppl (TIAGO CONTOUR NEXT EZ) w/Device Does not apply Kit, 1 Device by Does not apply route daily. (Patient not taking: Reported on 7/9/2024), Disp: 1 kit, Rfl: 0    ALLERGIES:   No Known Allergies    SOCIAL HISTORY:   Social History     Socioeconomic History    Marital status: Single   Tobacco Use    Smoking status: Never    Smokeless tobacco: Never   Vaping Use    Vaping status: Never Used    Substance and Sexual Activity    Alcohol use: No    Drug use: No       PAST MEDICAL HISTORY:   Past Medical History:    Amblyopia    OD    Diabetes (HCC)    Hyperlipidemia    Pregnancy (Formerly [facility])    12 hr labor , [facility], 33 1.2 week, 6 lb(s) Male / [place] ([facility]) Baby sent to [place] for 1 month due to breathing issues    Pregnancy (HCC)    8 hr labor  ; [facility]; 36 week 7 lb(s) Male; [place] ([facility] [address] [facility]) baby had problems breathing and remained in hospital for 2 weeks    Pregnancy (Formerly [facility])    spontaneous , unknown sex, SAB at 6 weeks no D&C    Pregnancy (Formerly [facility])    D&C 2009    Type II diabetes mellitus (Formerly [facility])    Unspecified essential hypertension    stopped medication     Urinary incontinence    surgical       PAST SURGICAL HISTORY:   Past Surgical History:   Procedure Laterality Date    Injection (ia)      injection tendon sheath, ligament (Marco Antonio Campos) (x2)    Retinal laser procedure Left     [facility]       PHYSICAL EXAM:   Vitals:    10/09/24 0753   BP: 131/78   Pulse: 84   Weight: 217 lb 3.2 oz (98.5 kg)       BMI:   Body mass index is 42.42 kg/m².      General Appearance:  alert, well developed, in no acute distress  Nutritional:  no extreme weight gain or loss  Head: Atraumatic  Eyes:  normal conjunctivae, sclera., normal sclera and normal pupils  Throat/Neck: normal sound to voice. Normal hearing, normal speech  Back: no kyphosis  Respiratory:  Speaking in full sentences, non-labored. no increased work of breathing, no audible wheezing    Skin:  normal moisture and skin texture, no visible lesions  Hair and nails: normal scalp hair  Hematologic:  no excessive bruising  Neuro: motor grossly intact, moving all extremities without difficulty  Psychiatric:  oriented to time, self, and place  Extremities: no lesions;     LABS: Pertinent labs reviewed    ASSESSMENT/PLAN:    -Reviewed with patient the pathogenesis of diabetes,  clinical significance of A1c, and common complications such as: microvascular, macrovascular and diabetic ketoacidosis. Patient verbalizes understanding of the importance of glycemic control and the goals of therapy.   -Discussed with patient glucose targets ranges (Fasting  and post prandial <180).     1.Type 2 Diabetes Mellitus, uncontrolled, with hyperglycemia with long term insulin use   -LAB DATA  HbA,C: 7.2% today   a) Medications  - increase Trulicity 3--> 4.5mg once a weekly- Risks and benefits reviewed. Verbalizes understanding.  - continue with Basaglar 84 units every night   -Novolog    30 units with breakfast + CF 2:50 >150   24 units with lunch + CF 2:50 >150   30 units with dinner + CF 2:50 >150   10 units with snack     -discussed to continue to low carb diet    -discussed to increase exercise/aerobic exercises to 5x weekly   -reviewed target goal BG readings and A1C  -reviewed when to call with abnormal BG readings to notify me    b) Nephropathy: GFR: 112 on 2024 and urine MA: 19 on 2024  c) UTD with optho   d) Foot exam: normal on 2024  E) cont. using Dexcom G7 CGM.   f) Life style changes reviewed.     2. Blood Pressure Management   -on losartan-hydrochlorothiazide 50-12.5mg once daily   -normotensive today     3.Hyperlipidemia   -LDL: 164 and Tri on 2024  -on atorvastatin 40mg at bedtime      RTC in 3 months   Patient instructed to call sooner if they develop Blood glucose readings <75 and/or if they have readings persistently >200.     The risks and benefits of my recommendations were discussed with the patient today. questions were also answered to the best of my knowledge. Patient verbalizes understanding of these issues and agrees to the plan.     10/9/2024  JOSS Martínez

## 2024-12-27 ENCOUNTER — TELEPHONE (OUTPATIENT)
Dept: ENDOCRINOLOGY CLINIC | Facility: CLINIC | Age: 49
End: 2024-12-27

## 2025-01-02 NOTE — TELEPHONE ENCOUNTER
Called patient and notified that we have not been to [facility] location. Per patient she does not need the dexcom reader and returned it to the [facility] location. She has dexcom G7 covered by her insurance and does not need sample for reader.  She is using her cell phone to read her blood sugar.

## 2025-01-15 ENCOUNTER — OFFICE VISIT (OUTPATIENT)
Dept: ENDOCRINOLOGY CLINIC | Facility: CLINIC | Age: 50
End: 2025-01-15

## 2025-01-15 VITALS
DIASTOLIC BLOOD PRESSURE: 82 MMHG | HEART RATE: 85 BPM | HEIGHT: 60 IN | SYSTOLIC BLOOD PRESSURE: 132 MMHG | BODY MASS INDEX: 42.8 KG/M2 | WEIGHT: 218 LBS

## 2025-01-15 DIAGNOSIS — E78.5 HYPERLIPIDEMIA, UNSPECIFIED HYPERLIPIDEMIA TYPE: ICD-10-CM

## 2025-01-15 DIAGNOSIS — Z79.4 UNCONTROLLED TYPE 2 DIABETES MELLITUS WITH HYPERGLYCEMIA, WITH LONG-TERM CURRENT USE OF INSULIN (HCC): Primary | ICD-10-CM

## 2025-01-15 DIAGNOSIS — E11.65 UNCONTROLLED TYPE 2 DIABETES MELLITUS WITH HYPERGLYCEMIA, WITH LONG-TERM CURRENT USE OF INSULIN (HCC): Primary | ICD-10-CM

## 2025-01-15 LAB
GLUCOSE BLOOD: 140
HEMOGLOBIN A1C: 7 % (ref 4.3–5.6)
TEST STRIP LOT #: NORMAL NUMERIC

## 2025-01-15 PROCEDURE — 83036 HEMOGLOBIN GLYCOSYLATED A1C: CPT | Performed by: NURSE PRACTITIONER

## 2025-01-15 PROCEDURE — 82947 ASSAY GLUCOSE BLOOD QUANT: CPT | Performed by: NURSE PRACTITIONER

## 2025-01-15 PROCEDURE — 99213 OFFICE O/P EST LOW 20 MIN: CPT | Performed by: NURSE PRACTITIONER

## 2025-01-15 RX ORDER — TIRZEPATIDE 7.5 MG/.5ML
7.5 INJECTION, SOLUTION SUBCUTANEOUS WEEKLY
Qty: 6 ML | Refills: 0 | Status: SHIPPED | OUTPATIENT
Start: 2025-01-15

## 2025-01-15 RX ORDER — ROSUVASTATIN CALCIUM 20 MG/1
20 TABLET, COATED ORAL NIGHTLY
Qty: 90 TABLET | Refills: 0 | Status: SHIPPED | OUTPATIENT
Start: 2025-01-15

## 2025-01-15 RX ORDER — INSULIN GLARGINE 100 [IU]/ML
84 INJECTION, SOLUTION SUBCUTANEOUS NIGHTLY
Qty: 75.6 ML | Refills: 0 | Status: SHIPPED | OUTPATIENT
Start: 2025-01-15 | End: 2025-04-15

## 2025-01-15 RX ORDER — INSULIN LISPRO 100 [IU]/ML
INJECTION, SOLUTION INTRAVENOUS; SUBCUTANEOUS
Qty: 90 ML | Refills: 0 | Status: SHIPPED | OUTPATIENT
Start: 2025-01-15

## 2025-01-15 RX ORDER — DULAGLUTIDE 4.5 MG/.5ML
4.5 INJECTION, SOLUTION SUBCUTANEOUS WEEKLY
Qty: 6 ML | Refills: 1 | Status: SHIPPED | OUTPATIENT
Start: 2025-01-15 | End: 2025-01-15

## 2025-01-15 RX ORDER — PEN NEEDLE, DIABETIC 30 GX3/16"
1 NEEDLE, DISPOSABLE MISCELLANEOUS 4 TIMES DAILY
Qty: 400 EACH | Refills: 1 | Status: SHIPPED | OUTPATIENT
Start: 2025-01-15

## 2025-01-15 RX ORDER — ACYCLOVIR 400 MG/1
1 TABLET ORAL
Qty: 9 EACH | Refills: 1 | Status: SHIPPED | OUTPATIENT
Start: 2025-01-15

## 2025-01-15 NOTE — PROGRESS NOTES
Name: Rivka Anders  Date: 1/15/2025    CHIEF COMPLAINT   Chief Complaint   Patient presents with    Diabetes       HISTORY OF PRESENT ILLNESS   Rivka Anders is a 49 year old female who presents for follow up on diabetes management.   HbA1C: 7.0% at POC today. Previously was 7.2% 10/2024.   Blood glucose is: 140 in clinic today.  Patient notes that she has been feeling well and denies any health changes or taking new medications since last office visit. She continues to follow a low carb diet and staying active per usual. She has been compliant with taking all diabetes medications.     FAMILY HISTORY OF DIABETES  -grandfather; uncle and aunt   DIABETES HISTORY  Diagnosed: >20 years ago   Prior HbA, C or glycohemoglobin were 8.2% 12/2015; 8.0% 3/2016; 9.2% 12/2016; 9.5% 3/2017; 11.1% 7/2017; >14% 2/2018; 13.6% 4/2018; 11.5% 7/2018; 10.9% 10/2018; 7.6% 10/2019; 7.2% 1/2020; 7.0% 11/2020; 7.6% 4/2021; 7.8% 6/2021; 7.8% 9/1/21; 7.0% 11/3/2021; 7.7% 2/9/2022; 7.9% 4/27/2022; 7.3% 9/19/2022; 8.0% 12/19/2022; 7.3% 3/20/2023; 7.2% 6/21/23; 7.3% 8/23/2023; 7.2% 11/2023; 7.5% 6/4/2024; 7.2% 10/9/2024; 7.0% at POC today;     PREVIOUS MEDICATION FOR DM:  Victoza -d/c'ed due to lack of effectiveness   -Rybelsus- d/c'ed due to lack of effectiveness   - will avoid sglt-2 medications due to increased frequency of UTI  - ozempic-d/c'ed due to lack of glycemic control   - Metformin -d/c'ed due to lack of glycemic control     CURRENT MEDICATIONS FOR DM:  - Trulicity 4.5mg once weekly - tolerating well; denies GI s/e     -Basaglar 88 units nightly     -Humalog 30 units with breakfast -- has been taking 30 units        24 units with lunch        30 units with dinner      10 units with late night snack      HOME GLUCOSE READINGS:   Has not been using Dexcom G7 in the past 2-3 weeks due to new health insurance plan preferring a different pharmacy and newly preferred pharmacy not having eprescription   Has been testing her glucose with  regular glucometer   Fastins  Before lunch: 130s-140s   Before dinner: -120s - 150s   Hypoglycemia: no - denies having had symptoms of hypoglycemia     HISTORY OF DIABETES COMPLICATIONS:  History of Retinopathy: yes - BDR s/p laser therapy - last eye exam within the last 12 months: yes  History of Neuropathy: yes- tried lyrica but didn't tolerate due to dizziness.   History of Nephropathy: no     ASSOCIATED COMPLICATIONS:   HTN: yes  Hyperlipidemia: yes   Coronary Artery Disease: no   Cerebrovascular Disease: no  Peripheral Vascular Disease: no      DIETARY COMPLIANCE:  Fair; trying to follow a low carb diet   - occ eating a late night snack  - eating a lower carb breakfast meal -- higher protein amt     EXERCISE:   No     Polyuria, polyphagia, polydipsia: no  Paresthesias: no   Blurred vision: no   Recent steroids, illness or infections: no     REVIEW OF SYSTEMS  Constitutional: Negative for: weight change, fever, fatigue, cold/heat intolerance  Eyes: Negative for:  Visual changes, proptosis, blurring  ENT: Negative for:  dysphagia, neck swelling, dysphonia  Respiratory: Negative for: hemoptysis, shortness of breath, cough, or dyspnea.  Cardiovascular: Negative for:  chest pain, chest discomfort, palpitations  GI: Negative for:  abdominal pain, nausea, vomiting, diarrhea, heartburn, constipation  Neurology: Negative for: headache, dizziness, syncope, numbness/tingling, or weakness.   Genito-Urinary: Negative for: dysuria, frequency or hematuria   Hematology/Lymphatics: Negative for: bruising, easy bleeding, lower extremity edema  Endocrine: Negative for: polyuria, polydipsia. No thyroid disease. No osteoporosis.   Skin: Negative for: rash, blister, infection or ulcers.    MEDICATIONS:     Current Outpatient Medications:     Dulaglutide (TRULICITY) 4.5 MG/0.5ML Subcutaneous Solution Pen-injector, Inject 4.5 mg into the skin once a week., Disp: 6 mL, Rfl: 1    insulin aspart (NOVOLOG FLEXPEN) 100 Units/mL  Subcutaneous Solution Pen-injector, Inject 30 units with breakfast, 24 units with lunch, 24 units with diner, 10 units with snack plus sliding scale. Max daily dose 100 units., Disp: 90 mL, Rfl: 0    phenazopyridine 100 MG Oral Tab, Take 1 tablet (100 mg total) by mouth 3 (three) times daily with meals., Disp: , Rfl:     atorvastatin 40 MG Oral Tab, Take 1 tablet (40 mg total) by mouth nightly., Disp: 90 tablet, Rfl: 0    losartan-hydroCHLOROthiazide 50-12.5 MG Oral Tab, Take 1 tablet by mouth daily., Disp: 90 tablet, Rfl: 1    Insulin Pen Needle (PEN NEEDLES) 32G X 4 MM Does not apply Misc, Inject 1 each into the skin in the morning, at noon, in the evening, and at bedtime. (Patient not taking: Reported on 1/15/2025), Disp: 400 each, Rfl: 1    Continuous Glucose Sensor (DEXCOM G7 SENSOR) Does not apply Misc, 1 each Every 10 days. (Patient not taking: Reported on 1/15/2025), Disp: 9 each, Rfl: 1    TRUE METRIX METER w/Device Does not apply Kit, USE AS DIRECTED (Patient not taking: Reported on 1/15/2025), Disp: 1 kit, Rfl: 0    Glucose Blood (TRUE METRIX BLOOD GLUCOSE TEST) In Vitro Strip, 1 each by In Vitro route in the morning, at noon, in the evening, and at bedtime. (Patient not taking: Reported on 1/15/2025), Disp: 400 strip, Rfl: 1    Glucose Blood (ONETOUCH VERIO) In Vitro Strip, CHECK BLOOD SUGAR 4 TIMES PER DAY (Patient not taking: Reported on 1/15/2025), Disp: 400 strip, Rfl: 1    Glucose Blood (ONETOUCH VERIO) In Vitro Strip, Check blood sugar 3 times daily. (Patient not taking: Reported on 1/15/2025), Disp: 300 strip, Rfl: 1    Lancets (ONETOUCH DELICA PLUS [id number]) Does not apply Misc, 1 strip by In Vitro route 2 (two) times daily. Check 4 times per day (Patient not taking: Reported on 1/15/2025), Disp: 400 each, Rfl: 1    Glucose Blood (CONTOUR NEXT TEST) In Vitro Strip, Check 2 times per day (Patient not taking: Reported on 1/15/2025), Disp: 200 strip, Rfl: 3    Blood Glucose Monitoring Suppl (TRUE  METRIX METER) w/Device Does not apply Kit, 1 kit by Does not apply route daily. (Patient not taking: Reported on 1/15/2025), Disp: 1 kit, Rfl: 0    Glucose Blood In Vitro Strip, Check BS twice daily (Patient not taking: Reported on 1/15/2025), Disp: 100 strip, Rfl: 11    Blood Glucose Monitoring Suppl (TIAGO CONTOUR NEXT EZ) w/Device Does not apply Kit, 1 Device by Does not apply route daily. (Patient not taking: Reported on 1/15/2025), Disp: 1 kit, Rfl: 0    ALLERGIES:   No Known Allergies    SOCIAL HISTORY:   Social History     Socioeconomic History    Marital status: Single   Tobacco Use    Smoking status: Never    Smokeless tobacco: Never   Vaping Use    Vaping status: Never Used   Substance and Sexual Activity    Alcohol use: No    Drug use: No       PAST MEDICAL HISTORY:   Past Medical History:    Amblyopia    OD    Diabetes (HCC)    Hyperlipidemia    Pregnancy (Formerly [facility])    12 hr labor , [facility], 33 1.2 week, 6 lb(s) Male / Anthony BARRERA ([facility]) Baby sent to [place] for 1 month due to breathing issues    Pregnancy (Formerly [facility])    8 hr labor  ; [facility]; 36 week 7 lb(s) Male; [place] ([facility] [address]) baby had problems breathing and remained in hospital for 2 weeks    Pregnancy (Formerly [facility])    spontaneous , unknown sex, SAB at 6 weeks no D&C    Pregnancy (Formerly [facility])    D&C 2009    Type II diabetes mellitus (Formerly [facility])    Unspecified essential hypertension    stopped medication     Urinary incontinence    surgical       PAST SURGICAL HISTORY:   Past Surgical History:   Procedure Laterality Date    Injection (ia)      injection tendon sheath, ligament (Marco Antonio Campos) (x2)    Retinal laser procedure Left     [facility]       PHYSICAL EXAM:   Vitals:    01/15/25 0756   BP: 142/84   BP Location: Right arm   Patient Position: Sitting   Cuff Size: large   Pulse: 91   Weight: 218 lb (98.9 kg)   Height: 5' (1.524 m)       BMI:   Body mass index is 42.58 kg/m².      General  Appearance:  alert, well developed, in no acute distress  Nutritional:  no extreme weight gain or loss  Head: Atraumatic  Eyes:  normal conjunctivae, sclera., normal sclera and normal pupils  Throat/Neck: normal sound to voice. Normal hearing, normal speech  Back: no kyphosis  Respiratory:  Speaking in full sentences, non-labored. no increased work of breathing, no audible wheezing    Skin:  normal moisture and skin texture, no visible lesions  Hair and nails: normal scalp hair  Hematologic:  no excessive bruising  Neuro: motor grossly intact, moving all extremities without difficulty  Psychiatric:  oriented to time, self, and place  Extremities: no lesions;     LABS: Pertinent labs reviewed    ASSESSMENT/PLAN:    -Reviewed with patient the pathogenesis of diabetes, clinical significance of A1c, and common complications such as: microvascular, macrovascular and diabetic ketoacidosis. Patient verbalizes understanding of the importance of glycemic control and the goals of therapy.   -Discussed with patient glucose targets ranges (Fasting  and post prandial <180).     1.Type 2 Diabetes Mellitus, uncontrolled, with hyperglycemia with long term insulin use   -LAB DATA  HbA,C: 7.0% today   a) Medications  - stop Trulicity  - start Mounjaro 7.5mg once weekly - Risks and benefits reviewed. Verbalizes understanding.   - continue with Basaglar 84 units every night   -Novolog    30 units with breakfast + CF 2:50 >150   24 units with lunch + CF 2:50 >150   30 units with dinner + CF 2:50 >150   10 units with snack     -discussed to continue to low carb diet    -discussed to increase exercise/aerobic exercises to 5x weekly   -reviewed target goal BG readings and A1C  -reviewed when to call with abnormal BG readings to notify me    b) Nephropathy: GFR: 112 on 6/8/2024 and urine MA: 19 on 6/4/2024  c) UTD with optho - last exam was 9/2024  d) Foot exam: normal on 7/9/2024  E) restart Dexcom G7 CGM. Rx sent to newly preferred  pharmacy  f) Life style changes reviewed.     2. Blood Pressure Management   -on losartan-hydrochlorothiazide 50-12.5mg once daily   -normotensive today     3.Hyperlipidemia   -LDL: 164 and Tri on 2024  -on atorvastatin 40mg at bedtime -- has been taking 2x weekly due to constipation   - reviewed ada guidelines and increased risk for CV disease  - stop atorvastatin   - start rosuvastatin 20mg nightly - Risks and benefits reviewed. Verbalizes understanding.  - discussed that she repeats lipid panel again in 3-4 months after starting rosuvastatin       RTC in 3-4 months   Patient instructed to call sooner if they develop Blood glucose readings <75 and/or if they have readings persistently >200.     The risks and benefits of my recommendations were discussed with the patient today. questions were also answered to the best of my knowledge. Patient verbalizes understanding of these issues and agrees to the plan.     1/15/2025  JOSS Martínez

## 2025-01-15 NOTE — PATIENT INSTRUCTIONS
A1C: 7.0% today --> previously was 7.2% on 10/9/2024  Blood glucose: 140 in clinic today  Endocrinology fax# [phone]    Medications:   - stop Trulicity   - start Mounjaro 7.5mg once weekly   - continue with Basaglar 84 units every night   -Novolog    30 units with breakfast + sliding scale    24 units with lunch + sliding scale    30 units with dinner + sliding scale    10 units with snack     If your blood glucose reading is 151- 200= +2 units            201-250= +4 units           251-300= +6 units      - stop atorvastatin   - start Rosuvastatin 20mg nightly    --> repeat cholesterol lab in 3-4 months - fast for 10hrs before         - continue to follow a low carb diet   - increase walking as much as safely able             Weight:  Wt Readings from Last 6 Encounters:   01/15/25 218 lb (98.9 kg)   10/09/24 217 lb 3.2 oz (98.5 kg)   07/09/24 216 lb (98 kg)   07/02/24 213 lb (96.6 kg)   11/27/23 207 lb (93.9 kg)   08/23/23 205 lb (93 kg)     A1C goal:  <7.0%    Blood sugar testing:  Start using Dexcom G7 continuous glucometer     Blood sugar targets:  Before breakfast:  (preferably < 110)  Before meals: <150   2hrs after meals: <180 (preferably <6.5%)    Call for persistent blood sugars < 75 or > 200

## 2025-01-25 ENCOUNTER — TELEPHONE (OUTPATIENT)
Dept: ENDOCRINOLOGY CLINIC | Facility: CLINIC | Age: 50
End: 2025-01-25

## 2025-01-25 DIAGNOSIS — Z79.4 UNCONTROLLED TYPE 2 DIABETES MELLITUS WITH HYPERGLYCEMIA, WITH LONG-TERM CURRENT USE OF INSULIN (HCC): Primary | ICD-10-CM

## 2025-01-25 DIAGNOSIS — E11.65 UNCONTROLLED TYPE 2 DIABETES MELLITUS WITH HYPERGLYCEMIA, WITH LONG-TERM CURRENT USE OF INSULIN (HCC): Primary | ICD-10-CM

## 2025-01-25 NOTE — TELEPHONE ENCOUNTER
Patient states pharmacy is requesting PA  for     Tirzepatide (MOUNJARO) 7.5 MG/0.5ML Subcutaneous Solution Auto-injector 6 mL     Please call

## 2025-01-25 NOTE — TELEPHONE ENCOUNTER
Current Outpatient Medications:     Continuous Glucose Sensor (DEXCOM G7 SENSOR) Does not apply Misc, 1 each Every 10 days., Disp: 9 each, Rfl: 1    insulin glargine (BASAGLAR KWIKPEN) 100 UNIT/ML Subcutaneous Solution Pen-injector, Inject 84 Units into the skin nightly., Disp: 75.6 mL, Rfl: 0    Insulin Lispro, 1 Unit Dial, (HUMALOG KWIKPEN) 100 UNIT/ML Subcutaneous Solution Pen-injector, Inject 30 units with breakfast, 24 units with lunch, 24 units with diner, 10 units with snack plus sliding scale. Max daily dose 100 units., Disp: 90 mL, Rfl: 0    Insulin Pen Needle (PEN NEEDLES) 32G X 4 MM Does not apply Misc, Inject 1 each into the skin in the morning, at noon, in the evening, and at bedtime., Disp: 400 each, Rfl: 1    Tirzepatide (MOUNJARO) 7.5 MG/0.5ML Subcutaneous Solution Auto-injector, Inject 7.5 mg into the skin once a week., Disp: 6 mL, Rfl: 0    rosuvastatin 20 MG Oral Tab, Take 1 tablet (20 mg total) by mouth nightly., Disp: 90 tablet, Rfl: 0    TRUE METRIX METER w/Device Does not apply Kit, USE AS DIRECTED (Patient not taking: Reported on 1/15/2025), Disp: 1 kit, Rfl: 0    Glucose Blood (TRUE METRIX BLOOD GLUCOSE TEST) In Vitro Strip, 1 each by In Vitro route in the morning, at noon, in the evening, and at bedtime. (Patient not taking: Reported on 1/15/2025), Disp: 400 strip, Rfl: 1    Glucose Blood (ONETOUCH VERIO) In Vitro Strip, CHECK BLOOD SUGAR 4 TIMES PER DAY (Patient not taking: Reported on 1/15/2025), Disp: 400 strip, Rfl: 1    Glucose Blood (ONETOUCH VERIO) In Vitro Strip, Check blood sugar 3 times daily. (Patient not taking: Reported on 1/15/2025), Disp: 300 strip, Rfl: 1    phenazopyridine 100 MG Oral Tab, Take 1 tablet (100 mg total) by mouth 3 (three) times daily with meals., Disp: , Rfl:     Lancets (ONETOUCH DELICA PLUS [id number]) Does not apply Misc, 1 strip by In Vitro route 2 (two) times daily. Check 4 times per day (Patient not taking: Reported on 1/15/2025), Disp: 400 each, Rfl:  1    Glucose Blood (CONTOUR NEXT TEST) In Vitro Strip, Check 2 times per day (Patient not taking: Reported on 1/15/2025), Disp: 200 strip, Rfl: 3    Blood Glucose Monitoring Suppl (TRUE METRIX METER) w/Device Does not apply Kit, 1 kit by Does not apply route daily. (Patient not taking: Reported on 1/15/2025), Disp: 1 kit, Rfl: 0

## 2025-01-29 ENCOUNTER — TELEPHONE (OUTPATIENT)
Dept: ENDOCRINOLOGY CLINIC | Facility: CLINIC | Age: 50
End: 2025-01-29

## 2025-01-29 DIAGNOSIS — Z79.4 UNCONTROLLED TYPE 2 DIABETES MELLITUS WITH HYPERGLYCEMIA, WITH LONG-TERM CURRENT USE OF INSULIN (HCC): Primary | ICD-10-CM

## 2025-01-29 DIAGNOSIS — E11.65 UNCONTROLLED TYPE 2 DIABETES MELLITUS WITH HYPERGLYCEMIA, WITH LONG-TERM CURRENT USE OF INSULIN (HCC): Primary | ICD-10-CM

## 2025-01-29 RX ORDER — CALCIUM CITRATE/VITAMIN D3 200MG-6.25
1 TABLET ORAL 4 TIMES DAILY
Qty: 400 STRIP | Refills: 1 | Status: SHIPPED | OUTPATIENT
Start: 2025-01-29

## 2025-01-29 RX ORDER — ACYCLOVIR 400 MG/1
1 TABLET ORAL
Qty: 9 EACH | Refills: 1 | Status: SHIPPED | OUTPATIENT
Start: 2025-01-29

## 2025-01-29 NOTE — TELEPHONE ENCOUNTER
Felicia COREAS,    Per patient her insulin need PA. Called [facility]. Per tech, tried running alternative for basaglar. Tried running for Lantus, glargine, tresiba, toujeo or semglee and none were on formulary.     Levemir and insulin aspart in formulary but still require a PA. Ok to send and submit PA?        She has 1 and 1/2 pen humalog and has 1 more day for basaglar.     We'll submit PA as priority.

## 2025-01-29 NOTE — TELEPHONE ENCOUNTER
Patient called to speak with a nurse in regards to the medications she is taking. Patient states certain medication were changed and the patient wasn't sure the name of the new medications for her diabetes. The patient states her sugar readings have been high and the patient wants to speak with a nurse in regards to the medications that were supposed to be sent to the pharmacy. Please call.

## 2025-01-30 RX ORDER — DAPAGLIFLOZIN 10 MG/1
10 TABLET, FILM COATED ORAL DAILY
Qty: 90 TABLET | Refills: 0 | Status: SHIPPED | OUTPATIENT
Start: 2025-01-30

## 2025-01-30 RX ORDER — PEN NEEDLE, DIABETIC 32GX 5/32"
NEEDLE, DISPOSABLE MISCELLANEOUS
Qty: 400 EACH | Refills: 1 | Status: SHIPPED | OUTPATIENT
Start: 2025-01-30

## 2025-01-30 RX ORDER — INSULIN DETEMIR 100 [IU]/ML
84 INJECTION, SOLUTION SUBCUTANEOUS NIGHTLY
Qty: 75.6 ML | Refills: 1 | Status: SHIPPED | OUTPATIENT
Start: 2025-01-30 | End: 2025-02-06

## 2025-01-30 RX ORDER — INSULIN GLARGINE 100 [IU]/ML
84 INJECTION, SOLUTION SUBCUTANEOUS NIGHTLY
Qty: 75 ML | Refills: 1 | Status: SHIPPED | OUTPATIENT
Start: 2025-01-30 | End: 2025-02-06

## 2025-01-30 RX ORDER — DULAGLUTIDE 4.5 MG/.5ML
4.5 INJECTION, SOLUTION SUBCUTANEOUS WEEKLY
Qty: 6 ML | Refills: 0 | Status: SHIPPED | OUTPATIENT
Start: 2025-01-30

## 2025-01-30 NOTE — TELEPHONE ENCOUNTER
Felicia GREENWOODRANDELL    She is using 20 Units TID for short acting (because afraid of runnin out) She has one more basaglar pen for tonight. We are waiting for basaglar urgent PA to be approved. Will get partial qty of aspart today and complete prescription tomorrow.     Usual dose for aspart is:   Inject 30 units with breakfast, 24 units with lunch, 24 units with dinner, 10 units with snack plus sliding scale.     Recent blood sugar:  1/30 fasting 125  1/29 fasting 286 before lunch 270 before dinner 242 bedtime 190  1/28 fasting 191 before lunch  226 2 pm 230 bedtime 218            Lex[employer] authorization team at [phone], transferred to pharmacy benefits to [phone], per rep aspart is on formulary and its tier 1, no PA needed and no restriction.     Humalog is non formulary and needs PA.   Basaglar is non formulary and needs PA.     PA submitted for basaglar over the phone. Will wait for decision in 48-72 hrs    Duration of call: 45 min     Called pharmacy to check status. Aspart went through with no charge. Basaglar pending. Per pharmacist sent different brand for pen needles - droplet. Rx sent    Needs PA for dexcom, PA submitted via CMM Key: [facility]    Also needs one touch verio but has not been able to get strips at [facility], [facility] or [facility]. Has been paying out of pocket in [employer] for strips.

## 2025-01-30 NOTE — TELEPHONE ENCOUNTER
Update noted.     Previous pended prescriptions were for Levemir LA insulin.. Should we send in a new rx for basaglar since this is what we are doing a PA for?     Thank you

## 2025-01-30 NOTE — TELEPHONE ENCOUNTER
Felicia COREAS,     Called farxiga, per tech its going through insurance ok as well as the pen needles.Trulicity needs PA.     Pa submitted via CMM for Trulicity 4.5 Key: [id number]    Called patient to update on new medications. RN reviewed side effects of Farxiga and risk for UTI. Per patient she has long history of frequent UTI and she prefers not to try Farxiga at this time.    She agrees to try trulicity and prefers trulicity vs mounjaro (reports better effect).     We will wait for PA decision for basaglar, truclity and dexcom G7. Patient currently using meter. RN advised to keep a log on blood sugars.

## 2025-01-30 NOTE — TELEPHONE ENCOUNTER
Update noted. Please notify patient of this.     Rx for Farxiga 10mg once daily in AM sent to pharmacy.     In the mean time, she can transition back to trulicity 4.5mg once weekly. Rx also sent to the pharmacy for this.     Please inform patient to give me an update on her glucose readings and tolerance to this new medication.       Thank you!

## 2025-01-30 NOTE — TELEPHONE ENCOUNTER
Update noted. Pended rx signed.     Please clarify with pharmacy that they still have levemir in stock. This brand has been discontinued by  and unsure if it still being stocked at local pharmacies.       Thank you!

## 2025-01-30 NOTE — TELEPHONE ENCOUNTER
Felicia,     Called pharmacy and they do not have and are unable to order Levemir. Script pended for Basaglar. Thanks!

## 2025-01-30 NOTE — TELEPHONE ENCOUNTER
Tried calling pharmacist line from insurance card ([phone])- [facility]   Transferred to Lex/Jonathan ([phone]) - was transferred again to pharmacy services PA line ([phone]) - call was never answered - will try again in the morning

## 2025-01-30 NOTE — TELEPHONE ENCOUNTER
BENEDICT Duarte denied:     \"Unable to approve the medication (MOUNJARO Soln Auto-inj 7.5MG/0.5ML) due to unmet criteria  (5) as outlined in the plan guideline below.  Member must try and fail the following drug alternatives: (Farxiga, Invokanet / XR, Jardiance,  Synjardy/ XR)\"    Please advise, thanks!

## 2025-01-31 NOTE — TELEPHONE ENCOUNTER
Current Outpatient Medications   Medication Sig Dispense Refill           KEY:[id number]                            insulin glargine (BASAGLAR KWIKPEN) 100 UNIT/ML Subcutaneous Solution Pen-injector Inject 84 Units into the skin nightly. 75 mL 1

## 2025-02-03 NOTE — TELEPHONE ENCOUNTER
Received fax from [facility] in regards to Trulicity soln auto inj 4.5mg/0.5ml. Medication has been approved from 01/30/2025 to 01/30/2026. [employer] message sent to pt and approval letter sent to scanning.

## 2025-02-03 NOTE — TELEPHONE ENCOUNTER
Received fax from [facility] dated on 1/31/25 regarding Dexcom G7 sensor was denied. Per the health plans guidelines continuous glucose monitors we are unable to approve the request due to unmet criteria.     Diagnosis of diabetes mellitus  Request adjustment to the members regimen are necessary based on glucose resting results  Member meets one of the following:  Member requires intensive insulin therapy as evidenced by one of the following: requires insulin injections at least 3 times per day or uses a continuous insulin infusion pump  Member is at least 18 years if age and has a diagnosis of type 2 diabetes that is currently program  Member has completed or is actively participating in a comprehensive diabetes management program  If age at least 4 years, member must use freestyle john  Request does not exceed health plan quantity limit.     Placed in denial folder.

## 2025-02-03 NOTE — TELEPHONE ENCOUNTER
Felicia COREAS,    Appeal letter for Dexcom pended for approval.     Called pharmacy to check status on other prescriptions. Trulicity going through insurance now and will be ordered today and be ready tomorrow with a copay of $15. Farxiga went through insurance ok. Sussy MCMULLEN is still pending. Will call insurance first thing tomorrow morning to check status on PA.

## 2025-04-12 NOTE — TELEPHONE ENCOUNTER
Endocrine refill protocol for lipid lowering medications    Protocol Criteria:  PASSED     If all below requirements are met, send a 90-day supply with 1 refill per provider protocol.    Verify appointment with Endocrinology completed in the last 6 months or scheduled in the next 3 months.  Lipid panel must have been completed in the last 12 months   ALT result below 80  LDL result below 130    Last completed office visit:1/15/2025 Tricia Wu APRN   Next scheduled Follow up:   Future Appointments   Date Time Provider Department Center   5/21/2025  8:00 AM Tricia Wu APRN [facility]      Last Lipid panel date: Cholesterol: 137, done on 7/10/2023.  HDL Cholesterol: 43, done on 7/10/2023.  TriGlycerides 156, done on 7/10/2023.  LDL Cholesterol: 67, done on 7/10/2023.     Last ALT result: Last ALT was 33 done on 7/10/2023.  Last AST was 18 done on 7/10/2023.

## 2025-04-14 RX ORDER — ROSUVASTATIN CALCIUM 20 MG/1
20 TABLET, COATED ORAL NIGHTLY
Qty: 90 TABLET | Refills: 0 | Status: SHIPPED | OUTPATIENT
Start: 2025-04-14

## 2025-05-01 ENCOUNTER — TELEPHONE (OUTPATIENT)
Dept: ENDOCRINOLOGY CLINIC | Facility: CLINIC | Age: 50
End: 2025-05-01

## 2025-05-01 NOTE — TELEPHONE ENCOUNTER
Patient requesting refill on the following. Please contact patient when complete. Please advise thank you.        Dulaglutide (TRULICITY) 4.5 MG/0.5ML Subcutaneous Solution Auto-injector   FlexTouch - 100 units / mL (U-100)  Flex pen - 100 units/ mL

## 2025-05-02 RX ORDER — DULAGLUTIDE 4.5 MG/.5ML
4.5 INJECTION, SOLUTION SUBCUTANEOUS WEEKLY
Qty: 6 ML | Refills: 0 | Status: SHIPPED | OUTPATIENT
Start: 2025-05-02

## 2025-05-02 RX ORDER — INSULIN DEGLUDEC 100 U/ML
84 INJECTION, SOLUTION SUBCUTANEOUS NIGHTLY
Qty: 75.6 ML | Refills: 0 | Status: SHIPPED | OUTPATIENT
Start: 2025-05-02 | End: 2025-07-31

## 2025-05-02 NOTE — TELEPHONE ENCOUNTER
Endocrine Refill protocol for oral and injectable diabetic medications    Protocol Criteria:  PASSED  Protocol met    If all below requirements are met, send a 90-day supply with 1 refill per provider protocol.    Verify appointment with Endocrinology completed in the last 6 months or scheduled in the next 3 months.  Verify A1C has been completed within the last 6 months and is below 8.5%     Last completed office visit: 1/15/2025 Tricia Wu APRN   Next scheduled Follow up:   Future Appointments   Date Time Provider Department Center   5/21/2025  8:00 AM Tricia Wu APRN [facility]      Last A1c result: Last A1c value was 7% done 1/15/2025.

## 2025-05-02 NOTE — TELEPHONE ENCOUNTER
Endocrine refill protocol for rapid acting, regular, intermediate, and mixed insulin:    Protocol Criteria:  PASSED Criteria met    If all below requirements are met, send a 90-day supply with 1 refill per provider protocol.    Verify appointment with Endocrinology completed in the last 6 months or scheduled in the next 3 months.  Verify A1C has been completed within the last 6 months and is below 8.5%    -May substitute prescriptions for Novolog and Humalog unless documented allergy (pens and vials) at the same dose and concentration per insurance preference and provider protocol.   -May substitute prescriptions for Novolin R and Humulin R unless documented allergy (pens and vials) at the same dose and concentration per insurance preference and provider protocol.   -May substitute prescriptions for Novolin N and Humulin N unless documented allergy (pens and vials) at the same dose and concentration per insurance preference and provider protocol.   -May substitute prescriptions for Humulin and Novolin 70/30 insulin unless documented allergy at the same dose and concentration per insurance preference and provider protocol.    Last completed office visit: 1/15/2025 Tricia Wu APRN   Next scheduled Follow up:   Future Appointments   Date Time Provider Department Center   5/21/2025  8:00 AM Tricia Wu APRN [facility]      Last A1C result: 7% done 1/15/2025.

## 2025-05-02 NOTE — TELEPHONE ENCOUNTER
Endocrine refill protocol for basal insulins     Protocol Criteria: PASSED Protocol met.    If all below requirements are met, send a 90-day supply with 1 refill per provider protocol.       Verify Appointment with Endocrinology completed in the last 6 months or scheduled in the next 3 months.  Verify A1C has been completed within the last 6 months and is below 8.5%     Last completed office visit:1/15/2025 Tricia Wu APRN   Next scheduled Follow up:   Future Appointments   Date Time Provider Department Center   5/21/2025  8:00 AM Tricia Wu APRN [facility]      Last A1c result: Last A1c value was 7% done 1/15/2025.

## 2025-05-21 ENCOUNTER — OFFICE VISIT (OUTPATIENT)
Dept: ENDOCRINOLOGY CLINIC | Facility: CLINIC | Age: 50
End: 2025-05-21

## 2025-05-21 ENCOUNTER — TELEPHONE (OUTPATIENT)
Dept: ENDOCRINOLOGY CLINIC | Facility: CLINIC | Age: 50
End: 2025-05-21

## 2025-05-21 VITALS
RESPIRATION RATE: 18 BRPM | HEART RATE: 89 BPM | BODY MASS INDEX: 41.03 KG/M2 | HEIGHT: 60 IN | SYSTOLIC BLOOD PRESSURE: 127 MMHG | DIASTOLIC BLOOD PRESSURE: 80 MMHG | WEIGHT: 209 LBS

## 2025-05-21 DIAGNOSIS — Z79.4 UNCONTROLLED TYPE 2 DIABETES MELLITUS WITH HYPERGLYCEMIA, WITH LONG-TERM CURRENT USE OF INSULIN (HCC): Primary | ICD-10-CM

## 2025-05-21 DIAGNOSIS — E11.65 UNCONTROLLED TYPE 2 DIABETES MELLITUS WITH HYPERGLYCEMIA, WITH LONG-TERM CURRENT USE OF INSULIN (HCC): Primary | ICD-10-CM

## 2025-05-21 LAB
GLUCOSE BLOOD: 135
HEMOGLOBIN A1C: 7.5 % (ref 4.3–5.6)
TEST STRIP LOT #: NORMAL NUMERIC

## 2025-05-21 PROCEDURE — 83036 HEMOGLOBIN GLYCOSYLATED A1C: CPT | Performed by: NURSE PRACTITIONER

## 2025-05-21 PROCEDURE — 99214 OFFICE O/P EST MOD 30 MIN: CPT | Performed by: NURSE PRACTITIONER

## 2025-05-21 PROCEDURE — 82947 ASSAY GLUCOSE BLOOD QUANT: CPT | Performed by: NURSE PRACTITIONER

## 2025-05-21 RX ORDER — TIRZEPATIDE 7.5 MG/.5ML
7.5 INJECTION, SOLUTION SUBCUTANEOUS WEEKLY
Qty: 2 ML | Refills: 0 | Status: SHIPPED | OUTPATIENT
Start: 2025-05-21

## 2025-05-21 RX ORDER — HYDROCHLOROTHIAZIDE 12.5 MG/1
1 CAPSULE ORAL
Qty: 6 EACH | Refills: 1 | Status: SHIPPED | OUTPATIENT
Start: 2025-05-21

## 2025-05-21 NOTE — PROGRESS NOTES
Name: Rivka Anders  Date: 5/21/2025    CHIEF COMPLAINT   Chief Complaint   Patient presents with    Diabetes     Follow-Up       HISTORY OF PRESENT ILLNESS   Rivka Anders is a 49 year old female who presents for follow up on diabetes management.   HbA1C: 7.5% at POC today. Previously was 7.0% on 1/15/2025.   Blood glucose is: 135 in clinic today.  Patient notes that she has been feeling well and denies any health changes or taking new medications since last office visit. She continues to follow a low carb diet, however she has not been active as usual. She has been compliant with taking diabetes medications.     FAMILY HISTORY OF DIABETES  -grandfather; uncle and aunt   DIABETES HISTORY  Diagnosed: >20 years ago   Prior HbA, C or glycohemoglobin were 8.2% 12/2015; 8.0% 3/2016; 9.2% 12/2016; 9.5% 3/2017; 11.1% 7/2017; >14% 2/2018; 13.6% 4/2018; 11.5% 7/2018; 10.9% 10/2018; 7.6% 10/2019; 7.2% 1/2020; 7.0% 11/2020; 7.6% 4/2021; 7.8% 6/2021; 7.8% 9/1/21; 7.0% 11/3/2021; 7.7% 2/9/2022; 7.9% 4/27/2022; 7.3% 9/19/2022; 8.0% 12/19/2022; 7.3% 3/20/2023; 7.2% 6/21/23; 7.3% 8/23/2023; 7.2% 11/2023; 7.5% 6/4/2024; 7.2% 10/9/2024; 7.0% 1/15/2025; 7.5% at POC today;     PREVIOUS MEDICATION FOR DM:  Victoza -d/c'ed due to lack of effectiveness   -Rybelsus- d/c'ed due to lack of effectiveness   - ozempic-d/c'ed due to lack of glycemic control   - Metformin -d/c'ed due to lack of glycemic control   - Mounjaro - was not able to get started due to not covered by health insurance   - Farxiga -d/c'ed due to UTI    CURRENT MEDICATIONS FOR DM:  - Trulicity 4.5mg once weekly - tolerating well; denies GI s/e     -Degludec 84 units nightly     -Aspart   30 units with breakfast      20 units with lunch     30 units with dinner    10 units with late night snack -- occ forgetting to take dose       HOME GLUCOSE READINGS:   Has not been able to use Dexcom G7 due to not being covered/preferred by insurance plan.     Has been testing her glucose  with regular glucometer   Fastin today; 180; 130s; 110s  Before lunch: 130- 160s   Before dinner: 180s- 200  Hypoglycemia: no     HISTORY OF DIABETES COMPLICATIONS:  History of Retinopathy: yes - BDR s/p laser therapy - last eye exam within the last 12 months: yes - last exam was 6 months ago at [facility]   History of Neuropathy: yes- tried lyrica but didn't tolerate due to dizziness.   History of Nephropathy: no     ASSOCIATED COMPLICATIONS:   HTN: yes  Hyperlipidemia: yes   Coronary Artery Disease: no   Cerebrovascular Disease: no  Peripheral Vascular Disease: no      DIETARY COMPLIANCE:  Fair; trying to follow a low carb diet     EXERCISE:   No     Polyuria, polyphagia, polydipsia: no  Paresthesias: no   Blurred vision: no   Recent steroids, illness or infections: no     REVIEW OF SYSTEMS  Constitutional: Negative for: weight change, fever, fatigue, cold/heat intolerance  Eyes: Negative for:  Visual changes, proptosis, blurring  ENT: Negative for:  dysphagia, neck swelling, dysphonia  Respiratory: Negative for: hemoptysis, shortness of breath, cough, or dyspnea.  Cardiovascular: Negative for:  chest pain, chest discomfort, palpitations  GI: Negative for:  abdominal pain, nausea, vomiting, diarrhea, heartburn, constipation  Neurology: Negative for: headache, dizziness, syncope, numbness/tingling, or weakness.   Genito-Urinary: Negative for: dysuria, frequency or hematuria   Hematology/Lymphatics: Negative for: bruising, easy bleeding, lower extremity edema  Endocrine: Negative for: polyuria, polydipsia. No thyroid disease. No osteoporosis.   Skin: Negative for: rash, blister, infection or ulcers.    MEDICATIONS:     Current Outpatient Medications:     Dulaglutide (TRULICITY) 4.5 MG/0.5ML Subcutaneous Solution Auto-injector, Inject 4.5 mg into the skin once a week., Disp: 6 mL, Rfl: 0    insulin degludec 100 units/mL Subcutaneous Solution Pen-injector, Inject 84 Units into the skin nightly., Disp: 75.6 mL, Rfl:  0    insulin aspart 100 Units/mL Subcutaneous Solution Pen-injector, Inject 30 units with breakfast, 24 units with lunch, 24 units with diner, 10 units with snack plus sliding scale. Max daily dose 100 units., Disp: 90 mL, Rfl: 1    ROSUVASTATIN 20 MG Oral Tab, Take 1 tablet (20 mg total) by mouth nightly., Disp: 90 tablet, Rfl: 0    Insulin Pen Needle (DROPLET PEN NEEDLES) 32G X 4 MM Does not apply Misc, To inject insulin 4 times a day, Disp: 400 each, Rfl: 1    dapagliflozin (FARXIGA) 10 MG Oral Tab, Take 1 tablet (10 mg total) by mouth daily., Disp: 90 tablet, Rfl: 0    Continuous Glucose Sensor (DEXCOM G7 SENSOR) Does not apply Misc, 1 each Every 10 days., Disp: 9 each, Rfl: 1    Glucose Blood (TRUE METRIX BLOOD GLUCOSE TEST) In Vitro Strip, 1 each by In Vitro route in the morning, at noon, in the evening, and at bedtime., Disp: 400 strip, Rfl: 1    Insulin Pen Needle (PEN NEEDLES) 32G X 4 MM Does not apply Misc, Inject 1 each into the skin in the morning, at noon, in the evening, and at bedtime., Disp: 400 each, Rfl: 1    TRUE METRIX METER w/Device Does not apply Kit, USE AS DIRECTED, Disp: 1 kit, Rfl: 0    Glucose Blood (ONETOUCH VERIO) In Vitro Strip, CHECK BLOOD SUGAR 4 TIMES PER DAY, Disp: 400 strip, Rfl: 1    Glucose Blood (ONETOUCH VERIO) In Vitro Strip, Check blood sugar 3 times daily., Disp: 300 strip, Rfl: 1    phenazopyridine 100 MG Oral Tab, Take 1 tablet (100 mg total) by mouth 3 (three) times daily with meals., Disp: , Rfl:     losartan-hydroCHLOROthiazide 50-12.5 MG Oral Tab, Take 1 tablet by mouth daily., Disp: 90 tablet, Rfl: 1    Lancets (ONETOUCH DELICA PLUS [id number]) Does not apply Misc, 1 strip by In Vitro route 2 (two) times daily. Check 4 times per day, Disp: 400 each, Rfl: 1    Glucose Blood (CONTOUR NEXT TEST) In Vitro Strip, Check 2 times per day, Disp: 200 strip, Rfl: 3    Blood Glucose Monitoring Suppl (TRUE METRIX METER) w/Device Does not apply Kit, 1 kit by Does not apply route  daily., Disp: 1 kit, Rfl: 0    Glucose Blood In Vitro Strip, Check BS twice daily, Disp: 100 strip, Rfl: 11    Blood Glucose Monitoring Suppl (TIAGO CONTOUR NEXT EZ) w/Device Does not apply Kit, 1 Device by Does not apply route daily., Disp: 1 kit, Rfl: 0    ALLERGIES:   No Known Allergies    SOCIAL HISTORY:   Social History     Socioeconomic History    Marital status: Single   Tobacco Use    Smoking status: Never    Smokeless tobacco: Never   Vaping Use    Vaping status: Never Used   Substance and Sexual Activity    Alcohol use: No    Drug use: No       PAST MEDICAL HISTORY:   Past Medical History:    Amblyopia    OD    Diabetes (HCC)    Hyperlipidemia    Pregnancy ([facility])    12 hr labor , [facility], 33 1.2 week, 6 lb(s) Male / [place] ([facility]) Baby sent to [place] for 1 month due to breathing issues    Pregnancy ([facility])    8 hr labor  ; [facility]; 36 week 7 lb(s) Male; [place] ([facility]) baby had problems breathing and remained in hospital for 2 weeks    Pregnancy ([facility])    spontaneous , unknown sex, SAB at 6 weeks no D&C    Pregnancy ([facility])    D&C 2009    Type II diabetes mellitus ([facility])    Unspecified essential hypertension    stopped medication     Urinary incontinence    surgical       PAST SURGICAL HISTORY:   Past Surgical History:   Procedure Laterality Date    Injection (ia)      injection tendon sheath, ligament (Marco Antonio Campos) (x2)    Retinal laser procedure Left     [facility]       PHYSICAL EXAM:   Vitals:    25 0750   BP: 127/80   BP Location: Right arm   Pulse: 89   Resp: 18   Weight: 209 lb (94.8 kg)   Height: 5' (1.524 m)       BMI:   Body mass index is 40.82 kg/m².      General Appearance:  alert, well developed, in no acute distress  Nutritional:  no extreme weight gain or loss  Head: Atraumatic  Eyes:  normal conjunctivae, sclera., normal sclera and normal pupils  Throat/Neck: normal sound to voice. Normal  hearing, normal speech  Back: no kyphosis  Respiratory:  Speaking in full sentences, non-labored. no increased work of breathing, no audible wheezing    Skin:  normal moisture and skin texture, no visible lesions  Hair and nails: normal scalp hair  Hematologic:  no excessive bruising  Neuro: motor grossly intact, moving all extremities without difficulty  Psychiatric:  oriented to time, self, and place  Extremities: no lesions;     LABS: Pertinent labs reviewed    ASSESSMENT/PLAN:    -Reviewed with patient the pathogenesis of diabetes, clinical significance of A1c, and common complications such as: microvascular, macrovascular and diabetic ketoacidosis. Patient verbalizes understanding of the importance of glycemic control and the goals of therapy.   -Discussed with patient glucose targets ranges (Fasting  and post prandial <180).     1.Type 2 Diabetes Mellitus, uncontrolled, with hyperglycemia with long term insulin use   -LAB DATA  HbA,C: 7.5% today   a) Medications  - stop Trulicity due to lack of glycemic control   - start Mounjaro 7.5mg once weekly - Risks and benefits reviewed. Verbalizes understanding.  - continue with Degludec 84 units nightly   - increase Novolog    30 units with breakfast + CF 2:50 >150   20 --> 26 units with lunch + CF 2:50 >150   30 units with dinner + CF 2:50 >150   10 units with snack     - reviewed importance of not missing snack insulin dose   -discussed to continue to low carb diet    -discussed to start exercise/aerobic exercises to 5x weekly   -reviewed target goal BG readings and A1C  -reviewed when to call with abnormal BG readings to notify me    b) Nephropathy: GFR: 112 on 6/8/2024 and urine MA: 19 on 6/4/2024 --> repeat labs   c) UTD with optho - last exam was 12/2024 --> referral for Dr. German reprinted today and encouraged to schedule apt.  d) Foot exam: normal on 7/9/2024  E) start freestyle john 3 plus. Rx sent to pharmacy today. Patient was educated on cgm use and  yoandy was downloaded on patient's phone   f) Life style changes reviewed.     2. Blood Pressure Management   -on losartan-hydrochlorothiazide 50-12.5mg once daily   -normotensive today     3.Hyperlipidemia   -LDL: 164 and Tri on 2024  - atorvastatin - d/c'ed due to constipation   -on rosuvastatin 20mg nightly  - repeat lipid panel       RTC in 3 months   Patient instructed to call sooner if they develop Blood glucose readings <75 and/or if they have readings persistently >200.     The risks and benefits of my recommendations were discussed with the patient today. questions were also answered to the best of my knowledge. Patient verbalizes understanding of these issues and agrees to the plan.     2025  JOSS Martínez

## 2025-05-21 NOTE — TELEPHONE ENCOUNTER
Please complete PA for Mounjaro:     Patient has t/f:     Victoza -d/c'ed due to lack of effectiveness   -Rybelsus- d/c'ed due to lack of effectiveness   - ozempic-d/c'ed due to lack of glycemic control   - Metformin -d/c'ed due to lack of glycemic control   - Farxiga -d/c'ed due to UTI  - Trulicity - d/c'ed due to lack of glycemic control       Thank you!

## 2025-05-21 NOTE — PATIENT INSTRUCTIONS
A1C: 7.5% today --> previously was 7.0% on 1/15/2025  Blood glucose: 135 in clinic today    Medications:   - continued Trulicity 4.5mg once weekly   --> once Mounjaro is approved --> start taking Mounjaro 7.5mg once weekly for 4 weeks   - continue with Degludec 84 units nightly   - increase Aspart   30 units with breakfast    20 --> 26 units with lunch     30 units with dinner    10 units with late night snack      - continue to follow a low carb diet   - increase walking - goal 30 mins daily       Weight:  Wt Readings from Last 6 Encounters:   05/21/25 209 lb (94.8 kg)   01/15/25 218 lb (98.9 kg)   10/09/24 217 lb 3.2 oz (98.5 kg)   07/09/24 216 lb (98 kg)   07/02/24 213 lb (96.6 kg)   11/27/23 207 lb (93.9 kg)     A1C goal:  <7.0%    Blood sugar testing:  Start Freestyle john 3 plus continuous glucometer     Blood sugar targets:  Before breakfast:  (preferably < 110)  Before meals: <150   2 hours after meals: <180 (preferably <150)     Call for persistent blood sugars < 75 or > 200

## 2025-06-05 NOTE — TELEPHONE ENCOUNTER
Received fax from [employer] dated on 6/1/25 stating the mounjaro 7.5mg has been approved qty up to 2 per 28 days effective 6/1/25 ends 6/1/26 mcm sent. Fax sent to scanning

## 2025-07-01 DIAGNOSIS — Z79.4 UNCONTROLLED TYPE 2 DIABETES MELLITUS WITH HYPERGLYCEMIA, WITH LONG-TERM CURRENT USE OF INSULIN (HCC): ICD-10-CM

## 2025-07-01 DIAGNOSIS — E11.65 UNCONTROLLED TYPE 2 DIABETES MELLITUS WITH HYPERGLYCEMIA, WITH LONG-TERM CURRENT USE OF INSULIN (HCC): ICD-10-CM

## 2025-07-01 RX ORDER — TIRZEPATIDE 7.5 MG/.5ML
7.5 INJECTION, SOLUTION SUBCUTANEOUS WEEKLY
Qty: 6 ML | Refills: 1 | Status: SHIPPED | OUTPATIENT
Start: 2025-07-01

## 2025-07-01 NOTE — TELEPHONE ENCOUNTER
Endocrine Refill protocol for oral and injectable diabetic medications    Protocol Criteria:  PASSED  Reason: N/A    If all below requirements are met, send a 90-day supply with 1 refill per provider protocol.    Verify appointment with Endocrinology completed in the last 6 months or scheduled in the next 3 months.  Verify A1C has been completed within the last 6 months and is below 8.5%     Last completed office visit: 5/21/2025 Tricia Wu APRN   Next scheduled Follow up:   Future Appointments   Date Time Provider Department Center   8/27/2025  8:00 AM Tricia Wu APRN [facility]      Last A1c result: Last A1c value was 7.5% done 5/21/2025.

## 2025-07-07 ENCOUNTER — LAB ENCOUNTER (OUTPATIENT)
Dept: LAB | Age: 50
End: 2025-07-07
Attending: NURSE PRACTITIONER
Payer: COMMERCIAL

## 2025-07-07 DIAGNOSIS — R35.89 POLYURIA: ICD-10-CM

## 2025-07-07 DIAGNOSIS — E11.65 UNCONTROLLED TYPE 2 DIABETES MELLITUS WITH HYPERGLYCEMIA, WITH LONG-TERM CURRENT USE OF INSULIN (HCC): ICD-10-CM

## 2025-07-07 DIAGNOSIS — Z79.4 UNCONTROLLED TYPE 2 DIABETES MELLITUS WITH HYPERGLYCEMIA, WITH LONG-TERM CURRENT USE OF INSULIN (HCC): ICD-10-CM

## 2025-07-07 LAB
ALBUMIN SERPL-MCNC: 4.7 G/DL (ref 3.2–4.8)
ALBUMIN/GLOB SERPL: 1.8 {RATIO} (ref 1–2)
ALP LIVER SERPL-CCNC: 78 U/L (ref 39–100)
ALT SERPL-CCNC: 26 U/L (ref 10–49)
ANION GAP SERPL CALC-SCNC: 5 MMOL/L (ref 0–18)
AST SERPL-CCNC: 20 U/L (ref ?–34)
BILIRUB SERPL-MCNC: 0.5 MG/DL (ref 0.3–1.2)
BUN BLD-MCNC: 11 MG/DL (ref 9–23)
BUN/CREAT SERPL: 16.2 (ref 10–20)
CALCIUM BLD-MCNC: 9.5 MG/DL (ref 8.7–10.4)
CHLORIDE SERPL-SCNC: 104 MMOL/L (ref 98–112)
CHOLEST SERPL-MCNC: 210 MG/DL (ref ?–200)
CO2 SERPL-SCNC: 32 MMOL/L (ref 21–32)
CREAT BLD-MCNC: 0.68 MG/DL (ref 0.55–1.02)
CREAT UR-SCNC: 219 MG/DL
EGFRCR SERPLBLD CKD-EPI 2021: 107 ML/MIN/1.73M2 (ref 60–?)
FASTING PATIENT LIPID ANSWER: YES
FASTING STATUS PATIENT QL REPORTED: YES
GLOBULIN PLAS-MCNC: 2.6 G/DL (ref 2–3.5)
GLUCOSE BLD-MCNC: 149 MG/DL (ref 70–99)
HDLC SERPL-MCNC: 39 MG/DL (ref 40–59)
LDLC SERPL CALC-MCNC: 130 MG/DL (ref ?–100)
MICROALBUMIN UR-MCNC: 2.2 MG/DL
MICROALBUMIN/CREAT 24H UR-RTO: 10 UG/MG (ref ?–30)
NONHDLC SERPL-MCNC: 171 MG/DL (ref ?–130)
OSMOLALITY SERPL CALC.SUM OF ELEC: 294 MOSM/KG (ref 275–295)
POTASSIUM SERPL-SCNC: 4.3 MMOL/L (ref 3.5–5.1)
PROT SERPL-MCNC: 7.3 G/DL (ref 5.7–8.2)
SODIUM SERPL-SCNC: 141 MMOL/L (ref 136–145)
TRIGL SERPL-MCNC: 229 MG/DL (ref 30–149)
TSI SER-ACNC: 1.87 UIU/ML (ref 0.55–4.78)
VIT D+METAB SERPL-MCNC: 21.5 NG/ML (ref 30–100)
VLDLC SERPL CALC-MCNC: 42 MG/DL (ref 0–30)

## 2025-07-07 PROCEDURE — 80053 COMPREHEN METABOLIC PANEL: CPT

## 2025-07-07 PROCEDURE — 82570 ASSAY OF URINE CREATININE: CPT

## 2025-07-07 PROCEDURE — 84443 ASSAY THYROID STIM HORMONE: CPT

## 2025-07-07 PROCEDURE — 82043 UR ALBUMIN QUANTITATIVE: CPT

## 2025-07-07 PROCEDURE — 80061 LIPID PANEL: CPT

## 2025-07-07 PROCEDURE — 82306 VITAMIN D 25 HYDROXY: CPT

## 2025-07-07 PROCEDURE — 36415 COLL VENOUS BLD VENIPUNCTURE: CPT

## 2025-08-14 RX ORDER — INSULIN DEGLUDEC 100 U/ML
84 INJECTION, SOLUTION SUBCUTANEOUS NIGHTLY
Qty: 75.6 ML | Refills: 1 | Status: SHIPPED | OUTPATIENT
Start: 2025-08-14

## 2025-08-27 ENCOUNTER — OFFICE VISIT (OUTPATIENT)
Dept: ENDOCRINOLOGY CLINIC | Facility: CLINIC | Age: 50
End: 2025-08-27

## 2025-08-27 VITALS
HEIGHT: 60 IN | HEART RATE: 84 BPM | SYSTOLIC BLOOD PRESSURE: 124 MMHG | BODY MASS INDEX: 40.17 KG/M2 | DIASTOLIC BLOOD PRESSURE: 80 MMHG | WEIGHT: 204.63 LBS | RESPIRATION RATE: 18 BRPM

## 2025-08-27 DIAGNOSIS — Z79.4 UNCONTROLLED TYPE 2 DIABETES MELLITUS WITH HYPERGLYCEMIA, WITH LONG-TERM CURRENT USE OF INSULIN (HCC): Primary | ICD-10-CM

## 2025-08-27 DIAGNOSIS — E55.9 VITAMIN D INSUFFICIENCY: ICD-10-CM

## 2025-08-27 DIAGNOSIS — E66.812 CLASS 2 OBESITY WITHOUT SERIOUS COMORBIDITY WITH BODY MASS INDEX (BMI) OF 39.0 TO 39.9 IN ADULT, UNSPECIFIED OBESITY TYPE: ICD-10-CM

## 2025-08-27 DIAGNOSIS — E11.65 UNCONTROLLED TYPE 2 DIABETES MELLITUS WITH HYPERGLYCEMIA, WITH LONG-TERM CURRENT USE OF INSULIN (HCC): Primary | ICD-10-CM

## 2025-08-27 LAB
GLUCOSE BLOOD: 179
HEMOGLOBIN A1C: 7.6 % (ref 4.3–5.6)
TEST STRIP LOT #: NORMAL NUMERIC

## 2025-08-27 PROCEDURE — 82947 ASSAY GLUCOSE BLOOD QUANT: CPT | Performed by: NURSE PRACTITIONER

## 2025-08-27 PROCEDURE — 99214 OFFICE O/P EST MOD 30 MIN: CPT | Performed by: NURSE PRACTITIONER

## 2025-08-27 PROCEDURE — 83036 HEMOGLOBIN GLYCOSYLATED A1C: CPT | Performed by: NURSE PRACTITIONER

## 2025-08-27 RX ORDER — PHENTERMINE AND TOPIRAMATE EXTENDED-RELEASE 7.5; 46 MG/1; MG/1
1 CAPSULE ORAL DAILY
Qty: 90 CAPSULE | Refills: 0 | Status: SHIPPED | OUTPATIENT
Start: 2025-09-22

## 2025-08-27 RX ORDER — TIRZEPATIDE 10 MG/.5ML
10 INJECTION, SOLUTION SUBCUTANEOUS WEEKLY
Qty: 6 ML | Refills: 0 | Status: SHIPPED | OUTPATIENT
Start: 2025-08-27

## 2025-08-27 RX ORDER — PHENTERMINE AND TOPIRAMATE EXTENDED-RELEASE 3.75; 23 MG/1; MG/1
1 CAPSULE ORAL DAILY
Qty: 30 CAPSULE | Refills: 0 | Status: SHIPPED | OUTPATIENT
Start: 2025-08-27

## (undated) DIAGNOSIS — E78.5 HYPERLIPIDEMIA, UNSPECIFIED HYPERLIPIDEMIA TYPE: ICD-10-CM

## (undated) DIAGNOSIS — I10 ESSENTIAL HYPERTENSION: ICD-10-CM

## (undated) DIAGNOSIS — E78.2 MIXED HYPERLIPIDEMIA: ICD-10-CM

## (undated) DIAGNOSIS — R60.0 BILATERAL LEG EDEMA: ICD-10-CM

## (undated) DIAGNOSIS — IMO0002 UNCONTROLLED DIABETES MELLITUS WITH MICROALBUMINURIA, WITH LONG-TERM CURRENT USE OF INSULIN: ICD-10-CM

## (undated) NOTE — MR AVS SNAPSHOT
[address]  [address] [facility]  [phone]               Thank you for choosing us for your health care visit with Genia Brito MD.  We are glad to serve you and happy to provide you with this summary o This list is accurate as of: 3/1/17 11:10 AM.  Always use your most recent med list.                Atorvastatin Calcium 20 MG Tabs   Take 1 tablet (20 mg total) by mouth nightly.    Commonly known as:  LIPITOR           Cyclobenzaprine HCl 10 MG Tabs   Com Results of Recent Testing     GLUCOSE BLOOD TEST      Component Value Standard Range & Units    GLUCOSE 170     Test Strip Lot # [id number] Numeric    Test Strip Expiration Date 02/28/18 Date                HEMOGLOBIN A1C      Component Value Standard Range &

## (undated) NOTE — LETTER
Date & Time: 10/22/2021, 11:11 AM  Patient: Scott Zelaya  Encounter Provider(s):    Ramirez Candelario PA-C       To Whom It May Concern:    Scott Zelaya was seen and treated in our department on 10/22/2021.  She should not return to work until 10/25/20

## (undated) NOTE — MR AVS SNAPSHOT
[address]  [address] [facility]  [phone]               Thank you for choosing us for your health care visit with Nohemi Laboy MD.  We are glad to serve you and happy to provide you with this summary o What changed: You were already taking a medication with the same name, and this prescription was added. Make sure you understand how and when to take each.    Commonly known as:  TRUE METRIX BLOOD GLUCOSE TEST           insulin aspart 100 UNIT/ML Sopn   [facility] Visits Status Diagnosis                 MyChart     Call the [employer] for assistance with your inactive [employer] account    If you have questions, you can call [phone] to talk to our [address]

## (undated) NOTE — MR AVS SNAPSHOT
[address]  [address] [facility]  [phone]               Thank you for choosing us for your health care visit with Ran Penn DO.   We are glad to serve you and happy to provide you with this summary Commonly known as:  TRUE METRIX BLOOD GLUCOSE TEST           insulin aspart 100 UNIT/ML Sopn   Inject 20 Units into the skin 3 (three) times daily before meals.    Commonly known as:  NOVOLOG FLEXPEN           Insulin Glargine 300 UNIT/ML Sopn   Inject 40 U Assoc Dx:  Stiffness of hand joint, right [M25.641], Hand joint stiff, left [M25.642]           CELINA [E]    Complete by:  Jan 27, 2017 (Approximate)    Assoc Dx:  Stiffness of hand joint, right [M25.641], Hand joint stiff, left [M25.642]           [employer]     Call the SM[employer] for assistance with your inactive [employer] account    If you have questions, you can call [phone] to talk to our [facility] Staff. Remember, [employer] is NOT to be used for urgent needs.   For medical emergencies, dial

## (undated) NOTE — LETTER
10/28/21        Aviva Read  [address] [place] [id number]      Dear Caterina Holley records indicate that you have outstanding lab work and or testing that was ordered for you and has not yet been completed:  Orders Placed This Encounter      CB

## (undated) NOTE — LETTER
07/12/21    Ladonna Russell  [address] [place] [id number]      Dear Ar Levine,    Our records indicate that you have outstanding lab work and or testing that was ordered for you and has not yet been completed:  Orders Placed This Encounter

## (undated) NOTE — LETTER
01/20/20        Florencio Agent  [address] [facility] [address]      Dear Rissa Rodriguez,    Our records indicate that you have outstanding lab work and or testing that was ordered for you and has not yet been completed:  Orders Placed This Encounter      AL

## (undated) NOTE — LETTER
12/27/21    [address] [address]      Dear Steven Mckeon records indicate that you have outstanding lab work and or testing that was ordered for you and has not yet been completed:  Orders Placed This Encounter      URIN

## (undated) NOTE — LETTER
01/20/20        Michi Brunner  [address] [place] [address]      Dear Chuck Postal,    Our records indicate that you have outstanding lab work and or testing that was ordered for you and has not yet been completed:  Orders Placed This Encounter      AL

## (undated) NOTE — MR AVS SNAPSHOT
Susan  [address]  [phone]               Thank you for choosing us for your health care visit with [facility], OD.   We are glad to serve you and happy to provide you with this summary of Commonly known as:  NOVOLOG FLEXPEN           Insulin Glargine 300 UNIT/ML Sopn   Inject 40 Units into the skin daily.    Commonly known as:  TOUJEO SOLOSTAR           Lancets 30G Misc   Check 3 times per day           lisinopril 5 MG Tabs   Take 1 tablet b